# Patient Record
Sex: FEMALE | Race: WHITE | Employment: FULL TIME | URBAN - METROPOLITAN AREA
[De-identification: names, ages, dates, MRNs, and addresses within clinical notes are randomized per-mention and may not be internally consistent; named-entity substitution may affect disease eponyms.]

---

## 2017-01-26 ENCOUNTER — OFFICE VISIT (OUTPATIENT)
Dept: INTERNAL MEDICINE CLINIC | Age: 37
End: 2017-01-26

## 2017-01-26 VITALS
OXYGEN SATURATION: 96 % | SYSTOLIC BLOOD PRESSURE: 102 MMHG | RESPIRATION RATE: 16 BRPM | TEMPERATURE: 98.2 F | WEIGHT: 120.4 LBS | BODY MASS INDEX: 21.33 KG/M2 | HEIGHT: 63 IN | HEART RATE: 71 BPM | DIASTOLIC BLOOD PRESSURE: 70 MMHG

## 2017-01-26 DIAGNOSIS — Z00.00 ROUTINE GENERAL MEDICAL EXAMINATION AT A HEALTH CARE FACILITY: ICD-10-CM

## 2017-01-26 DIAGNOSIS — F33.8 SEASONAL AFFECTIVE DISORDER (HCC): Primary | ICD-10-CM

## 2017-01-26 DIAGNOSIS — F32.5 DEPRESSION, MAJOR, IN REMISSION (HCC): ICD-10-CM

## 2017-01-26 DIAGNOSIS — Z80.51 FAMILY HISTORY OF RENAL CANCER: ICD-10-CM

## 2017-01-26 NOTE — PROGRESS NOTES
HISTORY OF PRESENT ILLNESS  Christina Ozuna is a 39 y.o. female. HPI  New Patient  Christina Ozuna is a new patient. Prior care:  She has not had a PCP. Has been seeing her gynecologist for primary care. Her gynecologist Records have been requested. ER Visits/ Hospitalizations [id number]: [facility]TI      Health Maintenance  Breast Cancer screening: Mammogram previously Not indicated. Colorectal Cancer screening: Not indicated. Cervical Cancer screening: Up to date, completed by gynecologist.    FHx of Renal Cancer- mother     Depression  Patient is seen for followup of depression. Mother suspected depression Previous Treatment includes Zoloft and individual therapy. +Seasonal Affective Disorder   Sleep <7hrs, 3 days of the weeks   Exercise >150mins , decreased rocking   Eating out a lot of processed foods (organic frozen foods)     she denies suicidal thoughts with specific plan. she experiences the following side effects from the treatment: none.     PHQ 2 / 9, over the last two weeks 1/26/2017   Little interest or pleasure in doing things More than half the days   Feeling down, depressed or hopeless More than half the days   Total Score PHQ 2 4   Trouble falling or staying asleep, or sleeping too much Several days   Feeling tired or having little energy Several days   Poor appetite or overeating Not at all   Feeling bad about yourself - or that you are a failure or have let yourself or your family down Several days   Trouble concentrating on things such as school, work, reading or watching TV Not at all   Moving or speaking so slowly that other people could have noticed; or the opposite being so fidgety that others notice Not at all   Thoughts of being better off dead, or hurting yourself in some way Not at all   PHQ 9 Score 7   How difficult have these problems made it for you to do your work, take care of your home and get along with others Somewhat difficult         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   Review of Systems   Constitutional: Negative for diaphoresis, fever and weight loss. Eyes: Negative for blurred vision and pain. Respiratory: Negative for shortness of breath. Cardiovascular: Negative for chest pain, orthopnea and leg swelling. Gastrointestinal: Negative for heartburn and nausea. Neurological: Negative for focal weakness and headaches. Psychiatric/Behavioral: Positive for depression. Negative for suicidal ideas. Patient Active Problem List    Diagnosis Date Noted    Seasonal affective disorder ([facility] 01/26/2017    Depression, major, in remission ([facility] 01/26/2017    Family history of renal cancer 01/26/2017           Allergies   Allergen Reactions    Sulfa (Sulfonamide Antibiotics) Rash      Visit Vitals    /70 (BP 1 Location: Right arm, BP Patient Position: Sitting)    Pulse 71    Temp 98.2 °F (36.8 °C) (Oral)    Resp 16    Ht 5' 3.11\" (1.603 m)    Wt 120 lb 6.4 oz (54.6 kg)    SpO2 96%    BMI 21.25 kg/m2       Physical Exam   Constitutional: She is oriented to person, place, and time. She appears well-developed. No distress. Eyes: Conjunctivae are normal.   Neck: Neck supple. No thyromegaly present. Cardiovascular: Normal rate, regular rhythm and normal heart sounds. Pulmonary/Chest: Effort normal and breath sounds normal. No respiratory distress. She has no wheezes. She has no rales. She exhibits no tenderness. Lymphadenopathy:     She has no cervical adenopathy. Neurological: She is alert and oriented to person, place, and time. Skin: Skin is warm. Psychiatric: She has a normal mood and affect. ASSESSMENT and PLAN  Matheus Dodd was seen today for establish care. Diagnoses and all orders for this visit:    Seasonal affective disorder ([facility]- optimize lifestyle management. Recommended pt try finding a therapist using the list provided and www. psychologytoday.com. Consider Mood Light.  [place] Review information given. Patient Education:  Reviewed concept of depression as biochemical imbalance of neurotransmitters and rationale for treatment. Instructed patient to contact office or 911 promptly should condition worsen or any new symptoms appear and provided on-call telephone numbers. IF THE PATIENT HAS ANY SUICIDAL OR HOMICIDAL IDEATION, CALL THE OFFICE, DISCUSS WITH A SUPPORT MEMBER OR GO TO THE ER IMMEDIATELY. Patient was agreeable with this    -     REFERRAL TO PSYCHOLOGY    Depression, major, in remission ([facility]- see above   -     REFERRAL TO PSYCHOLOGY    Routine general medical examination at a health care facility  -     CBC WITH AUTOMATED DIFF  -     LIPID PANEL  -     METABOLIC PANEL, COMPREHENSIVE  -     THYROID PANEL  -     TSH 3RD GENERATION  -     VITAMIN D, 25 HYDROXY  -     HEMOGLOBIN A1C WITH EAG  -     URINALYSIS W/MICROSCOPIC    Family history of renal cancer- check UA   -     URINALYSIS W/MICROSCOPIC      Follow-up Disposition:  Return in about 4 months (around 5/26/2017) for Physical - 30 minute appointment. Medication risks/benefits/costs/interactions/alternatives discussed with patient. Adriana Gomez  was given an after visit summary which includes diagnoses, current medications, & vitals. she expressed understanding with the diagnosis and plan.

## 2017-01-26 NOTE — MR AVS SNAPSHOT
Visit Information Date & Time Provider Department Dept. Phone Encounter #  
 1/26/2017  3:00 PM Carl Hickey MD [facility] [phone] [id number] Follow-up Instructions Return in about 4 months (around 5/26/2017) for Physical - 30 minute appointment. Upcoming Health Maintenance Date Due DTaP/Tdap/Td series (1 - Tdap) 11/15/2001 PAP AKA CERVICAL CYTOLOGY 11/15/2001 INFLUENZA AGE 9 TO ADULT 8/1/2016 Allergies as of 1/26/2017  Review Complete On: 1/26/2017 By: Carl Hickey MD  
  
 Severity Noted Reaction Type Reactions Sulfa (Sulfonamide Antibiotics)  01/26/2017    Rash Current Immunizations  Never Reviewed No immunizations on file. Not reviewed this visit You Were Diagnosed With   
  
 Codes Comments Seasonal affective disorder ([facility]    -  Primary ICD-10-CM: F39 
ICD-9-CM: 296.99 Depression, major, in remission ([facility]     ICD-10-CM: F32.5 ICD-9-CM: 296.25 Routine general medical examination at a health care facility     ICD-10-CM: Z00.00 ICD-9-CM: V70.0 Family history of renal cancer     ICD-10-CM: Z80.51 ICD-9-CM: V16.51 Vitals BP Pulse Temp Resp Height(growth percentile) Weight(growth percentile) 102/70 (BP 1 Location: Right arm, BP Patient Position: Sitting) 71 98.2 °F (36.8 °C) (Oral) 16 5' 3.11\" (1.603 m) 120 lb 6.4 oz (54.6 kg) SpO2 BMI OB Status Smoking Status 96% 21.25 kg/m2 Unknown Never Smoker Vitals History BMI and BSA Data Body Mass Index Body Surface Area  
 21.25 kg/m 2 1.56 m 2 Preferred Pharmacy Pharmacy Name Phone [id number] [phone] Your Updated Medication List  
  
Notice  As of 1/26/2017  4:00 PM  
 You have not been prescribed any medications. We Performed the Following CBC WITH AUTOMATED DIFF [24263 CPT(R)] HEMOGLOBIN A1C WITH EAG [59682 CPT(R)] LIPID PANEL [15704 CPT(R)] METABOLIC PANEL, COMPREHENSIVE [45917 CPT(R)] REFERRAL TO PSYCHOLOGY [DVZ28 Custom] Comments:  
 Please evaluate patient for adjustment disorder THYROID PANEL [id number] CPT(R)] TSH 3RD GENERATION [55352 CPT(R)] URINALYSIS W/MICROSCOPIC [32964 CPT(R)] VITAMIN D, 25 HYDROXY [id number] CPT(R)] Follow-up Instructions Return in about 4 months (around 5/26/2017) for Physical - 30 minute appointment. Referral Information Referral ID Referred By Referred To  
  
 [id number] Deborah Berrios, [address], [address] [facility] Phone: [phone] Visits Status Start Date End Date 1 New Request 1/26/17 1/26/18 If your referral has a status of pending review or denied, additional information will be sent to support the outcome of this decision. Patient Instructions It was a pleasure to meet you! As discussed: 
 
Health Maintenance I have ordered your age appropriate labs please complete them. You will need to fast 10-12hrs before your appointment. Great job on [employer] and exercising! Remember goal for exercise is 150minutes of moderate exercise a day and diet goal is to eat 50% fruits and vegetables with minimal sugar, fat and oil daily. See health.gov or choosemyplate.gov for more details. Your cervical cancer and breast cancer screening will be completed by your ob/ gyn as scheduled. Seasonal Affective Disorder 
-Consider purchasing a mood light (see information below) -Try finding a therapist using the list provided and www. psychologytoday.com. [address] 
200 [facility] 200 [facility], [facility] 
[phone] [place] Address: [place] [address], [place], [address] [phone] Recovering From Depression: Care Instructions Your Care Instructions Taking good care of yourself is important as you recover from depression. In time, your symptoms will fade as your treatment takes hold. Do not give up. Instead, focus your energy on getting better. Your mood will improve. It just takes some time. Focus on things that can help you feel better, such as being with friends and family, eating well, and getting enough rest. But take things slowly. Do not do too much too soon. You will begin to feel better gradually. Follow-up care is a key part of your treatment and safety. Be sure to make and go to all appointments, and call your doctor if you are having problems. It's also a good idea to know your test results and keep a list of the medicines you take. How can you care for yourself at home? Be realistic · If you have a large task to do, break it up into smaller steps you can handle, and just do what you can. · You may want to put off important decisions until your depression has lifted. If you have plans that will have a major impact on your life, such as marriage, divorce, or a job change, try to wait a bit. Talk it over with friends and loved ones who can help you look at the overall picture first. 
· Reaching out to people for help is important. Do not isolate yourself. Let your family and friends help you. Find someone you can trust and confide in, and talk to that person. · Be patient, and be kind to yourself. Remember that depression is not your fault and is not something you can overcome with willpower alone. Treatment is necessary for depression, just like for any other illness. Feeling better takes time, and your mood will improve little by little. Stay active · Stay busy and get outside. Take a walk, or try some other light exercise. · Talk with your doctor about an exercise program. Exercise can help with mild depression. · Go to a movie or concert. Take part in a Anabaptism activity or other social gathering. Go to a ball game. · Ask a friend to have dinner with you. Take care of yourself · Eat a balanced diet with plenty of fresh fruits and vegetables, whole grains, and lean protein. If you have lost your appetite, eat small snacks rather than large meals. · Avoid drinking alcohol or using illegal drugs. Do not take medicines that have not been prescribed for you. They may interfere with medicines you may be taking for depression, or they may make your depression worse. · Take your medicines exactly as they are prescribed. You may start to feel better within 1 to 3 weeks of taking antidepressant medicine. But it can take as many as 6 to 8 weeks to see more improvement. If you have questions or concerns about your medicines, or if you do not notice any improvement by 3 weeks, talk to your doctor. · If you have any side effects from your medicine, tell your doctor. Antidepressants can make you feel tired, dizzy, or nervous. Some people have dry mouth, constipation, headaches, sexual problems, or diarrhea. Many of these side effects are mild and will go away on their own after you have been taking the medicine for a few weeks. Some may last longer. Talk to your doctor if side effects are bothering you too much. You might be able to try a different medicine. · Get enough sleep. If you have problems sleeping: ¨ Go to bed at the same time every night, and get up at the same time every morning. ¨ Keep your bedroom dark and quiet. ¨ Do not exercise after 5:00 p.m. ¨ Avoid drinks with caffeine after 5:00 p.m. · Avoid sleeping pills unless they are prescribed by the doctor treating your depression. Sleeping pills may make you groggy during the day, and they may interact with other medicine you are taking. · If you have any other illnesses, such as diabetes, heart disease, or high blood pressure, make sure to continue with your treatment.  Tell your doctor about all of the medicines you take, including those with or without a prescription. · Keep the numbers for these national suicide hotlines: 2-870-043-TALK (8-508.324.1981) and 6-173-OEYQFVO (8-999.407.8824). If you or someone you know talks about suicide or feeling hopeless, get help right away. When should you call for help? Call 911 anytime you think you may need emergency care. For example, call if: 
· You feel like hurting yourself or someone else. · Someone you know has depression and is about to attempt or is attempting suicide. Call your doctor now or seek immediate medical care if: 
· You hear voices. · Someone you know has depression and: 
¨ Starts to give away his or her possessions. ¨ Uses illegal drugs or drinks alcohol heavily. ¨ Talks or writes about death, including writing suicide notes or talking about guns, knives, or pills. ¨ Starts to spend a lot of time alone. ¨ Acts very aggressively or suddenly appears calm. Watch closely for changes in your health, and be sure to contact your doctor if: 
· You do not get better as expected. Where can you learn more? Go to http://cristina-mily.info/. Enter V433 in the search box to learn more about \"Recovering From Depression: Care Instructions. \" Current as of: July 26, 2016 Content Version: 11.1 © 9261-7071 [employer]. Care instructions adapted under license by [employer] (which disclaims liability or warranty for this information). If you have questions about a medical condition or this instruction, always ask your healthcare professional. [place] [address] any warranty or liability for your use of this information. Introducing \[facility]\"" & HEALTH SERVICES! Aleena Islas introduces [employer] patient portal. Now you can access parts of your medical record, email your doctor's office, and request medication refills online. 1. In your internet browser, go to [email or web address] 2. Click on the First Time User? Click Here link in the Sign In box. You will see the New Member Sign Up page. 3. Enter your [employer] Access Code exactly as it appears below. You will not need to use this code after youve completed the sign-up process. If you do not sign up before the expiration date, you must request a new code. · [employer] Access Code: [email or web address] Expires: 4/26/2017  2:55 PM 
 
4. Enter the last four digits of your Social Security Number (xxxx) and Date of Birth (mm/dd/yyyy) as indicated and click Submit. You will be taken to the next sign-up page. 5. Create a [employer] ID. This will be your [employer] login ID and cannot be changed, so think of one that is secure and easy to remember. 6. Create a [employer] password. You can change your password at any time. 7. Enter your Password Reset Question and Answer. This can be used at a later time if you forget your password. 8. Enter your e-mail address. You will receive e-mail notification when new information is available in [address] 9. Click Sign Up. You can now view and download portions of your medical record. 10. Click the Download Summary menu link to download a portable copy of your medical information. If you have questions, please visit the Frequently Asked Questions section of the [employer] website. Remember, [employer] is NOT to be used for urgent needs. For medical emergencies, dial 911. Now available from your iPhone and Android! Please provide this summary of care documentation to your next provider. Your primary care clinician is listed as Danielle Purvis. If you have any questions after today's visit, please call [phone].

## 2017-01-26 NOTE — PATIENT INSTRUCTIONS
It was a pleasure to meet you! As discussed:    Health Maintenance   I have ordered your age appropriate labs please complete them. You will need to fast 10-12hrs before your appointment. Great job on [employer] and exercising! Remember goal for exercise is 150minutes of moderate exercise a day and diet goal is to eat 50% fruits and vegetables with minimal sugar, fat and oil daily. See health.gov or choosemyplate.gov for more details. Your cervical cancer and breast cancer screening will be completed by your ob/ gyn as scheduled. Seasonal Affective Disorder  -Consider purchasing a mood light (see information below)  -Try finding a therapist using the list provided and www. [employer]. Becky Warren [address] [address] [facility]   [address]  [address]  [phone]    [place] Joel   Address: [address], [address]  [phone]           Recovering From Depression: Care Instructions  Your Care Instructions  Taking good care of yourself is important as you recover from depression. In time, your symptoms will fade as your treatment takes hold. Do not give up. Instead, focus your energy on getting better. Your mood will improve. It just takes some time. Focus on things that can help you feel better, such as being with friends and family, eating well, and getting enough rest. But take things slowly. Do not do too much too soon. You will begin to feel better gradually. Follow-up care is a key part of your treatment and safety. Be sure to make and go to all appointments, and call your doctor if you are having problems. It's also a good idea to know your test results and keep a list of the medicines you take. How can you care for yourself at home? Be realistic  · If you have a large task to do, break it up into smaller steps you can handle, and just do what you can. · You may want to put off important decisions until your depression has lifted. If you have plans that will have a major impact on your life, such as marriage, divorce, or a job change, try to wait a bit. Talk it over with friends and loved ones who can help you look at the overall picture first.  · Reaching out to people for help is important. Do not isolate yourself. Let your family and friends help you. Find someone you can trust and confide in, and talk to that person. · Be patient, and be kind to yourself. Remember that depression is not your fault and is not something you can overcome with willpower alone. Treatment is necessary for depression, just like for any other illness. Feeling better takes time, and your mood will improve little by little. Stay active  · Stay busy and get outside. Take a walk, or try some other light exercise. · Talk with your doctor about an exercise program. Exercise can help with mild depression. · Go to a movie or concert. Take part in a Gnosticism activity or other social gathering. Go to a ball game. · Ask a friend to have dinner with you. Take care of yourself  · Eat a balanced diet with plenty of fresh fruits and vegetables, whole grains, and lean protein. If you have lost your appetite, eat small snacks rather than large meals. · Avoid drinking alcohol or using illegal drugs. Do not take medicines that have not been prescribed for you. They may interfere with medicines you may be taking for depression, or they may make your depression worse. · Take your medicines exactly as they are prescribed. You may start to feel better within 1 to 3 weeks of taking antidepressant medicine. But it can take as many as 6 to 8 weeks to see more improvement. If you have questions or concerns about your medicines, or if you do not notice any improvement by 3 weeks, talk to your doctor. · If you have any side effects from your medicine, tell your doctor. Antidepressants can make you feel tired, dizzy, or nervous.  Some people have dry mouth, constipation, headaches, sexual problems, or diarrhea. Many of these side effects are mild and will go away on their own after you have been taking the medicine for a few weeks. Some may last longer. Talk to your doctor if side effects are bothering you too much. You might be able to try a different medicine. · Get enough sleep. If you have problems sleeping:  ¨ Go to bed at the same time every night, and get up at the same time every morning. ¨ Keep your bedroom dark and quiet. ¨ Do not exercise after 5:00 p.m. ¨ Avoid drinks with caffeine after 5:00 p.m. · Avoid sleeping pills unless they are prescribed by the doctor treating your depression. Sleeping pills may make you groggy during the day, and they may interact with other medicine you are taking. · If you have any other illnesses, such as diabetes, heart disease, or high blood pressure, make sure to continue with your treatment. Tell your doctor about all of the medicines you take, including those with or without a prescription. · Keep the numbers for these national suicide hotlines: 1-896-319-TALK (8-125.554.1972) and 1-942-TOKWLBC (5-521.577.9290). If you or someone you know talks about suicide or feeling hopeless, get help right away. When should you call for help? Call 911 anytime you think you may need emergency care. For example, call if:  · You feel like hurting yourself or someone else. · Someone you know has depression and is about to attempt or is attempting suicide. Call your doctor now or seek immediate medical care if:  · You hear voices. · Someone you know has depression and:  ¨ Starts to give away his or her possessions. ¨ Uses illegal drugs or drinks alcohol heavily. ¨ Talks or writes about death, including writing suicide notes or talking about guns, knives, or pills. ¨ Starts to spend a lot of time alone. ¨ Acts very aggressively or suddenly appears calm.   Watch closely for changes in your health, and be sure to contact your doctor if:  · You do not get better as expected. Where can you learn more? Go to http://cristina-mily.info/. Enter N991 in the search box to learn more about \"Recovering From Depression: Care Instructions. \"  Current as of: July 26, 2016  Content Version: 11.1  © 3714-0775 [employer] Incorporated. Care instructions adapted under license by [employer] (which disclaims liability or warranty for this information). If you have questions about a medical condition or this instruction, always ask your healthcare professional. [address] any warranty or liability for your use of this information.

## 2017-05-09 ENCOUNTER — OFFICE VISIT (OUTPATIENT)
Dept: INTERNAL MEDICINE CLINIC | Age: 37
End: 2017-05-09

## 2017-05-09 VITALS
OXYGEN SATURATION: 97 % | WEIGHT: 124 LBS | RESPIRATION RATE: 12 BRPM | DIASTOLIC BLOOD PRESSURE: 64 MMHG | HEART RATE: 77 BPM | HEIGHT: 63 IN | TEMPERATURE: 98.6 F | SYSTOLIC BLOOD PRESSURE: 98 MMHG | BODY MASS INDEX: 21.97 KG/M2

## 2017-05-09 DIAGNOSIS — G44.52 NEW DAILY PERSISTENT HEADACHE: Primary | ICD-10-CM

## 2017-05-09 NOTE — PATIENT INSTRUCTIONS
Meclizine or Dramamine for dizziness     Vertigo: Care Instructions  Your Care Instructions  Vertigo is the feeling that you or your surroundings are moving when there is no actual movement. It is often described as a feeling of spinning, whirling, falling, or tilting. Vertigo may make you vomit or feel nauseated. You may have trouble standing or walking and may lose your balance. Vertigo is often related to an inner ear problem, but it can have other more serious causes. If vertigo continues, you may need more tests to find its cause. Follow-up care is a key part of your treatment and safety. Be sure to make and go to all appointments, and call your doctor if you are having problems. Its also a good idea to know your test results and keep a list of the medicines you take. How can you care for yourself at home? · Do not lie flat on your back. Prop yourself up slightly. This may reduce the spinning feeling. Keep your eyes open. · Move slowly so that you do not fall. · If your doctor recommends medicine, take it exactly as directed. · Do not drive while you are having vertigo. Certain exercises, called Jose-Daroff exercises, can help decrease vertigo. To do Jose-Daroff exercises:  · Sit on the edge of a bed or sofa and quickly lie down on the side that causes the worst vertigo. Lie on your side with your ear down. · Stay in this position for at least 30 seconds or until the vertigo goes away. · Sit up. If this causes vertigo, wait for it to stop. · Repeat the procedure on the other side. · Repeat this 10 times. Do these exercises 2 times a day until the vertigo is gone. When should you call for help? Call 911 anytime you think you may need emergency care. For example, call if:  · You passed out (lost consciousness). · You have symptoms of a stroke.  These may include:  ¨ Sudden numbness, tingling, weakness, or loss of movement in your face, arm, or leg, especially on only one side of your body.  ¨ Sudden vision changes. ¨ Sudden trouble speaking. ¨ Sudden confusion or trouble understanding simple statements. ¨ Sudden problems with walking or balance. ¨ A sudden, severe headache that is different from past headaches. Call your doctor now or seek immediate medical care if:  · Vertigo occurs with a fever, a headache, or ringing in your ears. · You have new or increased nausea and vomiting. Watch closely for changes in your health, and be sure to contact your doctor if:  · Vertigo gets worse or happens more often. · Vertigo has not gotten better after 2 weeks. Where can you learn more? Go to http://cristina-mily.info/. Enter J727 in the search box to learn more about \"Vertigo: Care Instructions. \"  Current as of: July 29, 2016  Content Version: 11.2  © 4809-0314 [employer]. Care instructions adapted under license by [employer] (which disclaims liability or warranty for this information). If you have questions about a medical condition or this instruction, always ask your healthcare professional. [place] [address] any warranty or liability for your use of this information. Headache: Care Instructions  Your Care Instructions    Headaches have many possible causes. Most headaches aren't a sign of a more serious problem, and they will get better on their own. Home treatment may help you feel better faster. The doctor has checked you carefully, but problems can develop later. If you notice any problems or new symptoms, get medical treatment right away. Follow-up care is a key part of your treatment and safety. Be sure to make and go to all appointments, and call your doctor if you are having problems. It's also a good idea to know your test results and keep a list of the medicines you take. How can you care for yourself at home? · Do not drive if you have taken a prescription pain medicine.   · Rest in a quiet, dark room until your headache is gone. Close your eyes and try to relax or go to sleep. Don't watch TV or read. · Put a cold, moist cloth or cold pack on the painful area for 10 to 20 minutes at a time. Put a thin cloth between the cold pack and your skin. · Use a warm, moist towel or a heating pad set on low to relax tight shoulder and neck muscles. · Have someone gently massage your neck and shoulders. · Take pain medicines exactly as directed. ¨ If the doctor gave you a prescription medicine for pain, take it as prescribed. ¨ If you are not taking a prescription pain medicine, ask your doctor if you can take an over-the-counter medicine. · Be careful not to take pain medicine more often than the instructions allow, because you may get worse or more frequent headaches when the medicine wears off. · Do not ignore new symptoms that occur with a headache, such as a fever, weakness or numbness, vision changes, or confusion. These may be signs of a more serious problem. To prevent headaches  · Keep a headache diary so you can figure out what triggers your headaches. Avoiding triggers may help you prevent headaches. Record when each headache began, how long it lasted, and what the pain was like (throbbing, aching, stabbing, or dull). Write down any other symptoms you had with the headache, such as nausea, flashing lights or dark spots, or sensitivity to bright light or loud noise. Note if the headache occurred near your period. List anything that might have triggered the headache, such as certain foods (chocolate, cheese, wine) or odors, smoke, bright light, stress, or lack of sleep. · Find healthy ways to deal with stress. Headaches are most common during or right after stressful times. Take time to relax before and after you do something that has caused a headache in the past.  · Try to keep your muscles relaxed by keeping good posture. Check your jaw, face, neck, and shoulder muscles for tension, and try relaxing them. When sitting at a desk, change positions often, and stretch for 30 seconds each hour. · Get plenty of sleep and exercise. · Eat regularly and well. Long periods without food can trigger a headache. · Treat yourself to a massage. Some people find that regular massages are very helpful in relieving tension. · Limit caffeine by not drinking too much coffee, tea, or soda. But don't quit caffeine suddenly, because that can also give you headaches. · Reduce eyestrain from computers by blinking frequently and looking away from the computer screen every so often. Make sure you have proper eyewear and that your monitor is set up properly, about an arm's length away. · Seek help if you have depression or anxiety. Your headaches may be linked to these conditions. Treatment can both prevent headaches and help with symptoms of anxiety or depression. When should you call for help? Call 911 anytime you think you may need emergency care. For example, call if:  · You have signs of a stroke. These may include:  ¨ Sudden numbness, paralysis, or weakness in your face, arm, or leg, especially on only one side of your body. ¨ Sudden vision changes. ¨ Sudden trouble speaking. ¨ Sudden confusion or trouble understanding simple statements. ¨ Sudden problems with walking or balance. ¨ A sudden, severe headache that is different from past headaches. Call your doctor now or seek immediate medical care if:  · You have a new or worse headache. · Your headache gets much worse. Where can you learn more? Go to http://cristina-mily.info/. Enter M271 in the search box to learn more about \"Headache: Care Instructions. \"  Current as of: October 14, 2016  Content Version: 11.2  © 9773-7496 [employer]. Care instructions adapted under license by [employer] (which disclaims liability or warranty for this information).  If you have questions about a medical condition or this instruction, always ask your healthcare professional. [place] [address] any warranty or liability for your use of this information.

## 2017-05-09 NOTE — MR AVS SNAPSHOT
Visit Information Date & Time Provider Department Dept. Phone Encounter #  
 5/9/2017  4:15 PM Arnaud Maciel, [address] [facility] [phone] Follow-up Instructions Return if symptoms worsen or fail to improve. Your Appointments 5/26/2017  8:00 AM  
PHYSICAL with Mayte Vázquez MD  
[facility]) Appt Note: cpe no pap  
 [address] [facility] [address]  
[address] [facility] [place] [address] Upcoming Health Maintenance Date Due DTaP/Tdap/Td series (1 - Tdap) 11/15/2001 INFLUENZA AGE 9 TO ADULT 8/1/2017 PAP AKA CERVICAL CYTOLOGY 6/14/2018 Allergies as of 5/9/2017  Review Complete On: 5/9/2017 By: Arnaud Maciel MD  
  
 Severity Noted Reaction Type Reactions Sulfa (Sulfonamide Antibiotics)  01/26/2017    Rash Current Immunizations  Reviewed on 5/9/2017 No immunizations on file. Reviewed by Segundo Jay RN on 5/9/2017 at  4:33 PM  
You Were Diagnosed With   
  
 Codes Comments New daily persistent headache    -  Primary ICD-10-CM: G44.52 
ICD-9-CM: 339.42 Vitals BP Pulse Temp Resp Height(growth percentile) Weight(growth percentile) 98/64 77 98.6 °F (37 °C) (Oral) 12 5' 3.11\" (1.603 m) 124 lb (56.2 kg) SpO2 BMI OB Status Smoking Status 97% 21.89 kg/m2 Medically Induced Never Smoker BMI and BSA Data Body Mass Index Body Surface Area  
 21.89 kg/m 2 1.58 m 2 Preferred Pharmacy Pharmacy Name Phone [id number] [phone] Your Updated Medication List  
  
   
This list is accurate as of: 5/9/17  4:51 PM.  Always use your most recent med list.  
  
  
  
  
 NORETH-ETHINYL ESTRADIOL-IRON PO Take  by mouth. Follow-up Instructions Return if symptoms worsen or fail to improve. Patient Instructions Meclizine or Dramamine for dizziness Vertigo: Care Instructions Your Care Instructions Vertigo is the feeling that you or your surroundings are moving when there is no actual movement. It is often described as a feeling of spinning, whirling, falling, or tilting. Vertigo may make you vomit or feel nauseated. You may have trouble standing or walking and may lose your balance. Vertigo is often related to an inner ear problem, but it can have other more serious causes. If vertigo continues, you may need more tests to find its cause. Follow-up care is a key part of your treatment and safety. Be sure to make and go to all appointments, and call your doctor if you are having problems. Its also a good idea to know your test results and keep a list of the medicines you take. How can you care for yourself at home? · Do not lie flat on your back. Prop yourself up slightly. This may reduce the spinning feeling. Keep your eyes open. · Move slowly so that you do not fall. · If your doctor recommends medicine, take it exactly as directed. · Do not drive while you are having vertigo. Certain exercises, called Jose-Daroff exercises, can help decrease vertigo. To do Jose-Daroff exercises: · Sit on the edge of a bed or sofa and quickly lie down on the side that causes the worst vertigo. Lie on your side with your ear down. · Stay in this position for at least 30 seconds or until the vertigo goes away. · Sit up. If this causes vertigo, wait for it to stop. · Repeat the procedure on the other side. · Repeat this 10 times. Do these exercises 2 times a day until the vertigo is gone. When should you call for help? Call 911 anytime you think you may need emergency care. For example, call if: 
· You passed out (lost consciousness). · You have symptoms of a stroke.  These may include: 
¨ Sudden numbness, tingling, weakness, or loss of movement in your face, arm, or leg, especially on only one side of your body. ¨ Sudden vision changes. ¨ Sudden trouble speaking. ¨ Sudden confusion or trouble understanding simple statements. ¨ Sudden problems with walking or balance. ¨ A sudden, severe headache that is different from past headaches. Call your doctor now or seek immediate medical care if: · Vertigo occurs with a fever, a headache, or ringing in your ears. · You have new or increased nausea and vomiting. Watch closely for changes in your health, and be sure to contact your doctor if: · Vertigo gets worse or happens more often. · Vertigo has not gotten better after 2 weeks. Where can you learn more? Go to http://cristina-mily.info/. Enter S124 in the search box to learn more about \"Vertigo: Care Instructions. \" Current as of: July 29, 2016 Content Version: 11.2 © 7237-2087 [employer]. Care instructions adapted under license by [employer] (which disclaims liability or warranty for this information). If you have questions about a medical condition or this instruction, always ask your healthcare professional. [address] any warranty or liability for your use of this information. Headache: Care Instructions Your Care Instructions Headaches have many possible causes. Most headaches aren't a sign of a more serious problem, and they will get better on their own. Home treatment may help you feel better faster. The doctor has checked you carefully, but problems can develop later. If you notice any problems or new symptoms, get medical treatment right away. Follow-up care is a key part of your treatment and safety. Be sure to make and go to all appointments, and call your doctor if you are having problems. It's also a good idea to know your test results and keep a list of the medicines you take. How can you care for yourself at home? · Do not drive if you have taken a prescription pain medicine. · Rest in a quiet, dark room until your headache is gone. Close your eyes and try to relax or go to sleep. Don't watch TV or read. · Put a cold, moist cloth or cold pack on the painful area for 10 to 20 minutes at a time. Put a thin cloth between the cold pack and your skin. · Use a warm, moist towel or a heating pad set on low to relax tight shoulder and neck muscles. · Have someone gently massage your neck and shoulders. · Take pain medicines exactly as directed. ¨ If the doctor gave you a prescription medicine for pain, take it as prescribed. ¨ If you are not taking a prescription pain medicine, ask your doctor if you can take an over-the-counter medicine. · Be careful not to take pain medicine more often than the instructions allow, because you may get worse or more frequent headaches when the medicine wears off. · Do not ignore new symptoms that occur with a headache, such as a fever, weakness or numbness, vision changes, or confusion. These may be signs of a more serious problem. To prevent headaches · Keep a headache diary so you can figure out what triggers your headaches. Avoiding triggers may help you prevent headaches. Record when each headache began, how long it lasted, and what the pain was like (throbbing, aching, stabbing, or dull). Write down any other symptoms you had with the headache, such as nausea, flashing lights or dark spots, or sensitivity to bright light or loud noise. Note if the headache occurred near your period. List anything that might have triggered the headache, such as certain foods (chocolate, cheese, wine) or odors, smoke, bright light, stress, or lack of sleep. · Find healthy ways to deal with stress. Headaches are most common during or right after stressful times.  Take time to relax before and after you do something that has caused a headache in the past. 
 · Try to keep your muscles relaxed by keeping good posture. Check your jaw, face, neck, and shoulder muscles for tension, and try relaxing them. When sitting at a desk, change positions often, and stretch for 30 seconds each hour. · Get plenty of sleep and exercise. · Eat regularly and well. Long periods without food can trigger a headache. · Treat yourself to a massage. Some people find that regular massages are very helpful in relieving tension. · Limit caffeine by not drinking too much coffee, tea, or soda. But don't quit caffeine suddenly, because that can also give you headaches. · Reduce eyestrain from computers by blinking frequently and looking away from the computer screen every so often. Make sure you have proper eyewear and that your monitor is set up properly, about an arm's length away. · Seek help if you have depression or anxiety. Your headaches may be linked to these conditions. Treatment can both prevent headaches and help with symptoms of anxiety or depression. When should you call for help? Call 911 anytime you think you may need emergency care. For example, call if: 
· You have signs of a stroke. These may include: 
¨ Sudden numbness, paralysis, or weakness in your face, arm, or leg, especially on only one side of your body. ¨ Sudden vision changes. ¨ Sudden trouble speaking. ¨ Sudden confusion or trouble understanding simple statements. ¨ Sudden problems with walking or balance. ¨ A sudden, severe headache that is different from past headaches. Call your doctor now or seek immediate medical care if: 
· You have a new or worse headache. · Your headache gets much worse. Where can you learn more? Go to http://cristina-mily.info/. Enter M271 in the search box to learn more about \"Headache: Care Instructions. \" Current as of: October 14, 2016 Content Version: 11.2 © 5180-7825 [employer].  Care instructions adapted under license by [address] (which disclaims liability or warranty for this information). If you have questions about a medical condition or this instruction, always ask your healthcare professional. [address] any warranty or liability for your use of this information. Introducing [facility] & HEALTH SERVICES! Herminia Brown introduces "[employer] patient portal. Now you can access parts of your medical record, email your doctor's office, and request medication refills online. 1. In your internet browser, go to [email or web address]://[email or web address]. SezWho/[employer] 2. Click on the First Time User? Click Here link in the Sign In box. You will see the New Member Sign Up page. 3. Enter your "[employer] Access Code exactly as it appears below. You will not need to use this code after youve completed the sign-up process. If you do not sign up before the expiration date, you must request a new code. · "[employer] Access Code: [email or web address] Expires: 8/7/2017  9:47 AM 
 
4. Enter the last four digits of your Social Security Number (xxxx) and Date of Birth (mm/dd/yyyy) as indicated and click Submit. You will be taken to the next sign-up page. 5. Create a "[employer] ID. This will be your "[employer] login ID and cannot be changed, so think of one that is secure and easy to remember. 6. Create a "[employer] password. You can change your password at any time. 7. Enter your Password Reset Question and Answer. This can be used at a later time if you forget your password. 8. Enter your e-mail address. You will receive e-mail notification when new information is available in [address] 9. Click Sign Up. You can now view and download portions of your medical record. 10. Click the Download Summary menu link to download a portable copy of your medical information. If you have questions, please visit the Frequently Asked Questions section of the "[employer] website.  Remember, "[employer] is NOT to be used for urgent needs. For medical emergencies, dial 911. Now available from your iPhone and Android! Please provide this summary of care documentation to your next provider. Your primary care clinician is listed as Bandar Lott. If you have any questions after today's visit, please call [phone].

## 2017-05-09 NOTE — PROGRESS NOTES
Kathie Dash is a 39 y.o. female who was seen in clinic today (5/9/2017). Assessment & Plan:  Gaurav Bass was seen today for ear pain. Diagnoses and all orders for this visit:    New daily persistent headache- this is a new problem, differential dx reviewed with the patient, sounds viral.  Reassured no signs of infection or intracranial etiology. Will treat with: NSAIDs, rest.  Red flags were reviewed with the patient to RTC or notify me, expected time course for resolution reviewed. Follow-up Disposition:  Return if symptoms worsen or fail to improve.       ----------------------------------------------------------------------    Subjective:  URI Review  Gaurav Bass returns to clinic today to talk about: headaches and concerned about ear infection for 2 days ago, which are unchanged since that time. She also reports right sided h/a (aching), dizziness w/ bending over, right ear pain, rhinorrhea and sinus congestion. She denies a history of: fever, chills, chest congestion, wheezing and SOB/GABRIEL. Treatments have included: ibuprofen which have been somewhat effective. Relevant PMH: No pertinent additional PMH. Patient reports sick contacts: no.         Prior to Admission medications    Medication Sig Start Date End Date Taking? Authorizing Provider   NORETH-ETHINYL ESTRADIOL-IRON PO Take  by mouth. Yes Historical Provider          Allergies   Allergen Reactions    Sulfa (Sulfonamide Antibiotics) Rash           ROS : per HPI       Objective:   Physical Exam   Constitutional: No distress. HENT:   Right Ear: Tympanic membrane is not erythematous and not bulging. A middle ear effusion (minimal) is present. Left Ear: Tympanic membrane is not erythematous and not bulging. A middle ear effusion (minimal) is present. Nose: No mucosal edema or rhinorrhea. Right sinus exhibits no maxillary sinus tenderness and no frontal sinus tenderness.  Left sinus exhibits no maxillary sinus tenderness and no frontal sinus tenderness. Mouth/Throat: Uvula is midline and mucous membranes are normal. No oropharyngeal exudate or posterior oropharyngeal erythema. Eyes: Conjunctivae are normal. No scleral icterus. Neck: Neck supple. Cardiovascular: Regular rhythm and normal heart sounds. No murmur heard. Pulmonary/Chest: Effort normal and breath sounds normal. She has no wheezes. She has no rales. Musculoskeletal:        Cervical back: She exhibits normal range of motion, no tenderness, no bony tenderness, no pain and no spasm. Lymphadenopathy:     She has no cervical adenopathy. Neurological:   Lottie Higashi testing was negative bilaterally          Visit Vitals    BP 98/64    Pulse 77    Temp 98.6 °F (37 °C) (Oral)    Resp 12    Ht 5' 3.11\" (1.603 m)    Wt 124 lb (56.2 kg)    SpO2 97%    BMI 21.89 kg/m2         Disclaimer:  Advised her to call back or return to office if symptoms worsen/change/persist.  Discussed expected course/resolution/complications of diagnosis in detail with patient. Medication risks/benefits/costs/interactions/alternatives discussed with patient. She was given an after visit summary which includes diagnoses, current medications, & vitals. She expressed understanding with the diagnosis and plan.         Darcy Kearns MD

## 2017-05-19 ENCOUNTER — OFFICE VISIT (OUTPATIENT)
Dept: INTERNAL MEDICINE CLINIC | Age: 37
End: 2017-05-19

## 2017-05-19 VITALS
HEIGHT: 63 IN | HEART RATE: 93 BPM | DIASTOLIC BLOOD PRESSURE: 64 MMHG | WEIGHT: 123.2 LBS | RESPIRATION RATE: 18 BRPM | BODY MASS INDEX: 21.83 KG/M2 | TEMPERATURE: 98.7 F | SYSTOLIC BLOOD PRESSURE: 105 MMHG | OXYGEN SATURATION: 98 %

## 2017-05-19 DIAGNOSIS — L30.9 DERMATITIS: Primary | ICD-10-CM

## 2017-05-19 LAB
25(OH)D3+25(OH)D2 SERPL-MCNC: 29.8 NG/ML (ref 30–100)
ALBUMIN SERPL-MCNC: 5.2 G/DL (ref 3.5–5.5)
ALBUMIN/GLOB SERPL: 1.7 {RATIO} (ref 1.2–2.2)
ALP SERPL-CCNC: 68 IU/L (ref 39–117)
ALT SERPL-CCNC: 23 IU/L (ref 0–32)
APPEARANCE UR: CLEAR
AST SERPL-CCNC: 27 IU/L (ref 0–40)
BACTERIA #/AREA URNS HPF: NORMAL /[HPF]
BASOPHILS # BLD AUTO: 0 X10E3/UL (ref 0–0.2)
BASOPHILS NFR BLD AUTO: 0 %
BILIRUB SERPL-MCNC: 2.4 MG/DL (ref 0–1.2)
BILIRUB UR QL STRIP: NEGATIVE
BUN SERPL-MCNC: 12 MG/DL (ref 6–20)
BUN/CREAT SERPL: 13 (ref 9–23)
CALCIUM SERPL-MCNC: 9.9 MG/DL (ref 8.7–10.2)
CASTS URNS QL MICRO: NORMAL /LPF
CHLORIDE SERPL-SCNC: 97 MMOL/L (ref 96–106)
CHOLEST SERPL-MCNC: 253 MG/DL (ref 100–199)
CO2 SERPL-SCNC: 24 MMOL/L (ref 18–29)
COLOR UR: YELLOW
CREAT SERPL-MCNC: 0.89 MG/DL (ref 0.57–1)
EOSINOPHIL # BLD AUTO: 0.1 X10E3/UL (ref 0–0.4)
EOSINOPHIL NFR BLD AUTO: 2 %
EPI CELLS #/AREA URNS HPF: NORMAL /HPF
ERYTHROCYTE [DISTWIDTH] IN BLOOD BY AUTOMATED COUNT: 12.6 % (ref 12.3–15.4)
EST. AVERAGE GLUCOSE BLD GHB EST-MCNC: 108 MG/DL
FT4I SERPL CALC-MCNC: 2.5 (ref 1.2–4.9)
GLOBULIN SER CALC-MCNC: 3.1 G/DL (ref 1.5–4.5)
GLUCOSE SERPL-MCNC: 84 MG/DL (ref 65–99)
GLUCOSE UR QL: NEGATIVE
HBA1C MFR BLD: 5.4 % (ref 4.8–5.6)
HCT VFR BLD AUTO: 45.6 % (ref 34–46.6)
HDLC SERPL-MCNC: 72 MG/DL
HGB BLD-MCNC: 15.3 G/DL (ref 11.1–15.9)
HGB UR QL STRIP: NEGATIVE
IMM GRANULOCYTES # BLD: 0 X10E3/UL (ref 0–0.1)
IMM GRANULOCYTES NFR BLD: 0 %
KETONES UR QL STRIP: NEGATIVE
LDLC SERPL CALC-MCNC: 167 MG/DL (ref 0–99)
LEUKOCYTE ESTERASE UR QL STRIP: NEGATIVE
LYMPHOCYTES # BLD AUTO: 1.8 X10E3/UL (ref 0.7–3.1)
LYMPHOCYTES NFR BLD AUTO: 29 %
MCH RBC QN AUTO: 30.9 PG (ref 26.6–33)
MCHC RBC AUTO-ENTMCNC: 33.6 G/DL (ref 31.5–35.7)
MCV RBC AUTO: 92 FL (ref 79–97)
MICRO URNS: NORMAL
MICRO URNS: NORMAL
MONOCYTES # BLD AUTO: 0.4 X10E3/UL (ref 0.1–0.9)
MONOCYTES NFR BLD AUTO: 6 %
MUCOUS THREADS URNS QL MICRO: PRESENT
NEUTROPHILS # BLD AUTO: 3.9 X10E3/UL (ref 1.4–7)
NEUTROPHILS NFR BLD AUTO: 63 %
NITRITE UR QL STRIP: NEGATIVE
PH UR STRIP: 6.5 [PH] (ref 5–7.5)
PLATELET # BLD AUTO: 344 X10E3/UL (ref 150–379)
POTASSIUM SERPL-SCNC: 4.1 MMOL/L (ref 3.5–5.2)
PROT SERPL-MCNC: 8.3 G/DL (ref 6–8.5)
PROT UR QL STRIP: NEGATIVE
RBC # BLD AUTO: 4.95 X10E6/UL (ref 3.77–5.28)
RBC #/AREA URNS HPF: NORMAL /HPF
SODIUM SERPL-SCNC: 137 MMOL/L (ref 134–144)
SP GR UR: 1.03 (ref 1–1.03)
T3RU NFR SERPL: 22 % (ref 24–39)
T4 SERPL-MCNC: 11.3 UG/DL (ref 4.5–12)
TRIGL SERPL-MCNC: 70 MG/DL (ref 0–149)
TSH SERPL DL<=0.005 MIU/L-ACNC: 0.8 UIU/ML (ref 0.45–4.5)
UROBILINOGEN UR STRIP-MCNC: 0.2 MG/DL (ref 0.2–1)
VLDLC SERPL CALC-MCNC: 14 MG/DL (ref 5–40)
WBC # BLD AUTO: 6.2 X10E3/UL (ref 3.4–10.8)
WBC #/AREA URNS HPF: NORMAL /HPF

## 2017-05-19 RX ORDER — MOMETASONE FUROATE 1 MG/G
CREAM TOPICAL 2 TIMES DAILY
Qty: 30 G | Refills: 1 | Status: SHIPPED | OUTPATIENT
Start: 2017-05-19 | End: 2018-01-23

## 2017-05-19 NOTE — PATIENT INSTRUCTIONS
Elocon cream topically twice daily x 1 week  Claritin or Allegra daily   Call or return to clinic if these symptoms worsen or fail to improve as anticipated.

## 2017-05-19 NOTE — MR AVS SNAPSHOT
Visit Information Date & Time Provider Department Dept. Phone Encounter #  
 5/19/2017  1:45 PM Cyn Corbin, [facility] 2 [id number] Your Appointments 5/26/2017  8:00 AM  
PHYSICAL with Chyna nUderwood MD  
[address] [facility] Jeannette George) Appt Note: cpe no pap  
 [address] [facility] [address]  
[facility] [address] [facility] Upcoming Health Maintenance Date Due DTaP/Tdap/Td series (1 - Tdap) 11/15/2001 INFLUENZA AGE 9 TO ADULT 8/1/2017 PAP AKA CERVICAL CYTOLOGY 6/14/2018 Allergies as of 5/19/2017  Review Complete On: 5/19/2017 By: Theresa Olivarez LPN Severity Noted Reaction Type Reactions Sulfa (Sulfonamide Antibiotics)  01/26/2017    Rash Current Immunizations  Reviewed on 5/9/2017 No immunizations on file. Not reviewed this visit Vitals BP Pulse Temp Resp Height(growth percentile) Weight(growth percentile) 105/64 (BP 1 Location: Right arm, BP Patient Position: Sitting) 93 98.7 °F (37.1 °C) (Oral) 18 5' 3\" (1.6 m) 123 lb 3.2 oz (55.9 kg) SpO2 BMI OB Status Smoking Status 98% 21.82 kg/m2 Medically Induced Never Smoker Vitals History BMI and BSA Data Body Mass Index Body Surface Area  
 21.82 kg/m 2 1.58 m 2 Preferred Pharmacy Pharmacy Name Phone [id number] [address] [phone] Your Updated Medication List  
  
   
This list is accurate as of: 5/19/17  2:16 PM.  Always use your most recent med list.  
  
  
  
  
 mometasone 0.1 % topical cream  
Commonly known as:  Star Coyer Apply  to affected area two (2) times a day. NORETH-ETHINYL ESTRADIOL-IRON PO Take  by mouth. Prescriptions Sent to Pharmacy  Refills  
 mometasone (ELOCON) 0.1 % topical cream 1  
 Sig: Apply  to affected area two (2) times a day. Class: Normal  
 Pharmacy: [address] Ph #: [phone] Route: Topical  
  
Patient Instructions Elocon cream topically twice daily x 1 week Claritin or Allegra daily Call or return to clinic if these symptoms worsen or fail to improve as anticipated. Introducing [facility]! [facility] introduces Wellkeeper patient portal. Now you can access parts of your medical record, email your doctor's office, and request medication refills online. 1. In your internet browser, go to [email or web address] 2. Click on the First Time User? Click Here link in the Sign In box. You will see the New Member Sign Up page. 3. Enter your Wellkeeper Access Code exactly as it appears below. You will not need to use this code after youve completed the sign-up process. If you do not sign up before the expiration date, you must request a new code. · Wellkeeper Access Code: [email or web address] Expires: 8/7/2017  9:47 AM 
 
4. Enter the last four digits of your Social Security Number (xxxx) and Date of Birth (mm/dd/yyyy) as indicated and click Submit. You will be taken to the next sign-up page. 5. Create a Wellkeeper ID. This will be your Wellkeeper login ID and cannot be changed, so think of one that is secure and easy to remember. 6. Create a Wellkeeper password. You can change your password at any time. 7. Enter your Password Reset Question and Answer. This can be used at a later time if you forget your password. 8. Enter your e-mail address. You will receive e-mail notification when new information is available in [address] 9. Click Sign Up. You can now view and download portions of your medical record. 10. Click the Download Summary menu link to download a portable copy of your medical information.  
 
If you have questions, please visit the Frequently Asked Questions section of the mgMEDIA. Remember, cWyzehart is NOT to be used for urgent needs. For medical emergencies, dial 911. Now available from your iPhone and Android! Please provide this summary of care documentation to your next provider. Your primary care clinician is listed as David Gupta. If you have any questions after today's visit, please call [phone].

## 2017-05-19 NOTE — PROGRESS NOTES
HISTORY OF PRESENT ILLNESS  Rafi Hansen is a 39 y.o. female. HPI  Patient complains of itching bilateral lower legs for about 1 week. She has applied topical Hydrocortisone without relief. She works at [employer]. She has no other complaints. She denies known insect bites or other rashes. Denies new soaps, creams, or detergents. History reviewed. No pertinent past medical history. Current Outpatient Prescriptions on File Prior to Visit   Medication Sig Dispense Refill    NORETH-ETHINYL ESTRADIOL-IRON PO Take  by mouth. No current facility-administered medications on file prior to visit. Allergies   Allergen Reactions    Sulfa (Sulfonamide Antibiotics) Rash      Social History     Social History    Marital status: SINGLE     Spouse name: N/A    Number of children: N/A    Years of education: N/A     Occupational History    Not on file. Social History Main Topics    Smoking status: Never Smoker    Smokeless tobacco: Never Used    Alcohol use Yes    Drug use: No    Sexual activity: Yes     Birth control/ protection: Pill     Other Topics Concern    Not on file     Social History Narrative      ROS  Per HPI  Physical Exam   Visit Vitals    /64 (BP 1 Location: Right arm, BP Patient Position: Sitting)    Pulse 93    Temp 98.7 °F (37.1 °C) (Oral)    Resp 18    Ht 5' 3\" (1.6 m)    Wt 123 lb 3.2 oz (55.9 kg)    LMP Comment: no cycles due to birth control    SpO2 98%  Comment: RA    BMI 21.82 kg/m2    Patient appears well  Heart[de-identified] normal rate, regular rhythm, normal S1, S2, no murmurs, rubs, clicks or gallops. Chest: clear to auscultation, no wheezes, rales or rhonchi,   Extremities: no edema  Diffusely prominent mildly erythematous follicles bilateral lower legs. No papules, pustules or vesicles. No increased warmth or surrounding erythema. ASSESSMENT and PLAN  Dermatitis ?  Folliculitis   Topical Elocon cream bid x 7 days Rx 30 g, 1 RF  Claritin or Allegra 1 daily for allergic component  Avoid shaving for next week, and call or return to clinic if these symptoms worsen or fail to improve as anticipated. Patient will keep her previously scheduled appointment with Stephany Ibrahim MD on 5/26/17       Follow-up Disposition:  Return if symptoms worsen or fail to improve. Advised to call back or return to office if symptoms worsen/change/persist.  Discussed expected course/resolution/complications of diagnosis in detail with patient. Medication risks/benefits/costs/interactions/alternatives discussed with patient. She was given an after visit summary which includes diagnoses, current medications, & vitals. She expressed understanding with the diagnosis and plan.

## 2017-05-26 ENCOUNTER — OFFICE VISIT (OUTPATIENT)
Dept: INTERNAL MEDICINE CLINIC | Age: 37
End: 2017-05-26

## 2017-05-26 VITALS
SYSTOLIC BLOOD PRESSURE: 100 MMHG | RESPIRATION RATE: 16 BRPM | OXYGEN SATURATION: 97 % | WEIGHT: 120.2 LBS | DIASTOLIC BLOOD PRESSURE: 70 MMHG | HEIGHT: 63 IN | TEMPERATURE: 98.4 F | HEART RATE: 72 BPM | BODY MASS INDEX: 21.3 KG/M2

## 2017-05-26 DIAGNOSIS — E80.6 HYPERBILIRUBINEMIA: ICD-10-CM

## 2017-05-26 DIAGNOSIS — E78.2 MIXED HYPERLIPIDEMIA: ICD-10-CM

## 2017-05-26 DIAGNOSIS — Z00.00 WELL WOMAN EXAM (NO GYNECOLOGICAL EXAM): Primary | ICD-10-CM

## 2017-05-26 DIAGNOSIS — E55.9 VITAMIN D INSUFFICIENCY: ICD-10-CM

## 2017-05-26 RX ORDER — NORETHINDRONE AND ETHINYL ESTRADIOL 0.8-25(24)
KIT ORAL
Refills: 12 | COMMUNITY
Start: 2017-05-09 | End: 2018-09-13 | Stop reason: SDUPTHER

## 2017-05-26 RX ORDER — OMEGA-3-ACID ETHYL ESTERS 1 G/1
2 CAPSULE, LIQUID FILLED ORAL 2 TIMES DAILY
Qty: 60 CAP | Refills: 4 | Status: SHIPPED | OUTPATIENT
Start: 2017-05-26 | End: 2018-01-23

## 2017-05-26 NOTE — PROGRESS NOTES
Lab review  High Cholesterol   Continue to stay physically active (>150mins/ week of moderate activity) and eat a healthy diet low in saturated fat (<5% daily) and high in fruits and vegetables (>50% daily). (Recommendation: Start Fish Oil Supplement, reduce carbs to 150-200g/ day and the Mediterranean Diet). -Your vitamin D is a little low. Start taking an over the counter vitamin D supplement 800-1000 IU/ once a day now.      The remainder of your labs were normal. Some labs that may have been tested and their explanation are:  Your electrolytes, kidney & liver function (Metabolic Panel)   Anemia, blood cells (CBC)  Thyroid (TSH + T4, T3)  Hormones (prolactin, vitamin D )   Diabetes (Hemoglobin A1c)

## 2017-05-26 NOTE — PATIENT INSTRUCTIONS
It was a pleasure to see you! As discussed:    Lab review  High Cholesterol   Continue to stay physically active (>150mins/ week of moderate activity) and eat a healthy diet low in saturated fat (<5% daily) and high in fruits and vegetables (>50% daily). (Recommendation: Start Fish Oil Supplement, reduce carbs to 150-200g/ day and the Mediterranean Diet). -Your vitamin D is a little low. Start taking an over the counter vitamin D supplement 800-1000 IU/ once a day now. The remainder of your labs were normal. Some labs that may have been tested and their explanation are:  Your electrolytes, kidney & liver function (Metabolic Panel)   Anemia, blood cells (CBC)  Thyroid (TSH + T4, T3)  Hormones (prolactin, vitamin D )   Diabetes (Hemoglobin A1c)          Mediterranean Diet: Care Instructions  Your Care Instructions  The Mediterranean diet features foods eaten in [place], [place], [place] and [place], and other countries that border the [facility]. It emphasizes eating a diet rich in fruits, vegetables, nuts, and high-fiber grains, and limits meat, cheese, and sweets. The Mediterranean diet may:  · Prevent heart disease and lower the risk of a heart attack or stroke. · Prevent type 2 diabetes. · Prevent Alzheimer's disease and other dementia. · Prevent depression. · Prevent Parkinson's disease. This diet contains more fat than other heart-healthy diets. But the fats are mainly from nuts, unsaturated oils, such as fish oils, olive oil, and certain nut or seed oils (such as canola, soybean, or flaxseed oil). These types of oils may help protect the heart and blood vessels. Follow-up care is a key part of your treatment and safety. Be sure to make and go to all appointments, and call your doctor if you are having problems. It's also a good idea to know your test results and keep a list of the medicines you take. How can you care for yourself at home?   What to eat  · Eat a variety of fruits and vegetables each day, such as grapes, blueberries, tomatoes, broccoli, peppers, figs, olives, spinach, eggplant, beans, lentils, and chickpeas. · Eat a variety of whole-grain foods each day, such as oats, brown rice, and whole wheat bread, pasta, and couscous. · Eat fish at least 2 times a week. Try tuna, salmon, mackerel, lake trout, herring, or sardines. · Eat moderate amounts of low-fat dairy products, such as milk, cheese, or yogurt. · Eat moderate amounts of poultry and eggs. · Choose healthy (unsaturated) fats, such as nuts, olive oil and certain nut or seed oils like canola, soybean, and flaxseed. · Limit unhealthy (saturated) fats, such as butter, palm oil, and coconut oil. And limit fats found in animal products, such as meat and dairy products made with whole milk. Try to eat red meat only a few times a month in very small amounts. · Limit sweets and desserts to only a few times a week. This includes sugar-sweetened drinks like soda. The Mediterranean diet may also include red wine with your meal--1 glass each day for women and up to 2 glasses a day for men. Tips for changing your diet  · Dip bread in a mix of olive oil and fresh herbs instead of using butter. · Add avocado slices to your sandwich instead of matos. · Have fish for lunch or dinner instead of red meat. Brush the fish with olive oil, and broil or grill it. · Sprinkle your salad with seeds or nuts instead of cheese. · Cook with olive or canola oil instead of butter or oils that are high in saturated fat. · Switch from 2% milk or whole milk to 1% or fat-free milk. · Dip raw vegetables in a vinaigrette dressing or hummus instead of dips made from mayonnaise or sour cream.  · Have a piece of fruit for dessert instead of a piece of cake. Try baked apples, or have some dried fruit. Part of the Mediterranean diet is being active. Get at least 30 minutes of exercise on most days of the week. Walking is a good choice.  You also may want to do other activities, such as running, swimming, cycling, or playing tennis or team sports. Where can you learn more? Go to http://cristina-mily.info/. Enter O407 in the search box to learn more about \"Mediterranean Diet: Care Instructions. \"  Current as of: October 21, 2016  Content Version: 11.2  © 5433-8389 [employer]. Care instructions adapted under license by [employer] (which disclaims liability or warranty for this information). If you have questions about a medical condition or this instruction, always ask your healthcare professional. [place] [address] any warranty or liability for your use of this information.

## 2017-05-26 NOTE — LETTER
5/26/2017 9:04 AM 
 
Ms. [address] [facility] [address] Lab review High Cholesterol Continue to stay physically active (>150mins/ week of moderate activity) and eat a healthy diet low in saturated fat (<5% daily) and high in fruits and vegetables (>50% daily). (Recommendation: Start Fish Oil Supplement, reduce carbs to 150-200g/ day and the Mediterranean Diet). -Your vitamin D is a little low. Start taking an over the counter vitamin D supplement 800-1000 IU/ once a day now. The remainder of your labs were normal. Some labs that may have been tested and their explanation are: 
Your electrolytes, kidney & liver function (Metabolic Panel) Anemia, blood cells (CBC) Thyroid (TSH + T4, T3) Hormones (prolactin, vitamin D ) Diabetes (Hemoglobin A1c) Resulted Orders CBC WITH AUTOMATED DIFF Result Value Ref Range WBC 6.2 3.4 - 10.8 x10E3/uL  
 RBC 4.95 3.77 - 5.28 x10E6/uL HGB 15.3 11.1 - 15.9 g/dL HCT 45.6 34.0 - 46.6 % MCV 92 79 - 97 fL  
 MCH 30.9 26.6 - 33.0 pg  
 MCHC 33.6 31.5 - 35.7 g/dL  
 RDW 12.6 12.3 - 15.4 % PLATELET 618 178 - 416 x10E3/uL NEUTROPHILS 63 % Lymphocytes 29 % MONOCYTES 6 % EOSINOPHILS 2 % BASOPHILS 0 %  
 ABS. NEUTROPHILS 3.9 1.4 - 7.0 x10E3/uL Abs Lymphocytes 1.8 0.7 - 3.1 x10E3/uL  
 ABS. MONOCYTES 0.4 0.1 - 0.9 x10E3/uL  
 ABS. EOSINOPHILS 0.1 0.0 - 0.4 x10E3/uL  
 ABS. BASOPHILS 0.0 0.0 - 0.2 x10E3/uL IMMATURE GRANULOCYTES 0 %  
 ABS. IMM. GRANS. 0.0 0.0 - 0.1 x10E3/uL Narrative Performed at:  [address]  [id number] : Carol Aggarwal MD, Phone:  [phone] LIPID PANEL Result Value Ref Range Cholesterol, total 253 (H) 100 - 199 mg/dL Triglyceride 70 0 - 149 mg/dL HDL Cholesterol 72 >39 mg/dL VLDL, calculated 14 5 - 40 mg/dL LDL, calculated 167 (H) 0 - 99 mg/dL Narrative Performed at:  [place] [address] [address]  [id number] : Liliana Lazaro MD, Phone:  [phone] METABOLIC PANEL, COMPREHENSIVE Result Value Ref Range Glucose 84 65 - 99 mg/dL BUN 12 6 - 20 mg/dL Creatinine 0.89 0.57 - 1.00 mg/dL GFR est non-AA 84 >59 mL/min/1.73 GFR est AA 96 >59 mL/min/1.73  
 BUN/Creatinine ratio 13 9 - 23 Sodium 137 134 - 144 mmol/L Potassium 4.1 3.5 - 5.2 mmol/L Chloride 97 96 - 106 mmol/L  
 CO2 24 18 - 29 mmol/L Calcium 9.9 8.7 - 10.2 mg/dL Protein, total 8.3 6.0 - 8.5 g/dL Albumin 5.2 3.5 - 5.5 g/dL GLOBULIN, TOTAL 3.1 1.5 - 4.5 g/dL A-G Ratio 1.7 1.2 - 2.2 Bilirubin, total 2.4 (H) 0.0 - 1.2 mg/dL Alk. phosphatase 68 39 - 117 IU/L  
 AST (SGOT) 27 0 - 40 IU/L  
 ALT (SGPT) 23 0 - 32 IU/L Narrative Performed at:  [address]  [id number] : Liliana Lazaro MD, Phone:  [phone] THYROID PANEL Result Value Ref Range T4, Total 11.3 4.5 - 12.0 ug/dL  
 T3 Uptake 22 (L) 24 - 39 % Free Thyroxine Index 2.5 1.2 - 4.9 Narrative Performed at:  [address]  [id number] : Liliana Lazaro MD, Phone:  [phone] TSH 3RD GENERATION Result Value Ref Range TSH 0.795 0.450 - 4.500 uIU/mL Narrative Performed at:  [address]  [id number] : Liliana Lazaro MD, Phone:  [phone] VITAMIN D, 25 HYDROXY Result Value Ref Range VITAMIN D, 25-HYDROXY 29.8 (L) 30.0 - 100.0 ng/mL Comment:  
   Vitamin D deficiency has been defined by the [facility] practice guideline as a 
level of serum 25-OH vitamin D less than 20 ng/mL (1,2). The Endocrine Society went on to further define vitamin D 
insufficiency as a level between 21 and 29 ng/mL (2). 1. IOM ([place] of Medicine). 2010. Dietary reference intakes for calcium and D. [address] [place] [address]: The 
   [employer]. 2. Uriel MF, Franchesca NC, Suzanne RASHID, et al. 
   Evaluation, treatment, and prevention of vitamin D 
   deficiency: an Endocrine Society clinical practice 
   guideline. JCEM. 2011 Jul; 96(7):1911-30. Narrative Performed at:  [address]  [id number] : Adelia Reeder MD, Phone:  [phone] HEMOGLOBIN A1C WITH EAG Result Value Ref Range Hemoglobin A1c 5.4 4.8 - 5.6 % Comment:  
            Pre-diabetes: 5.7 - 6.4 Diabetes: >6.4 Glycemic control for adults with diabetes: <7.0 Estimated average glucose 108 mg/dL Narrative Performed at:  [address]  [id number] : Adelia Reeder MD, Phone:  [phone] URINALYSIS W/MICROSCOPIC Result Value Ref Range Specific Gravity 1.026 1.005 - 1.030  
 pH (UA) 6.5 5.0 - 7.5 Color Yellow Yellow Appearance Clear Clear Leukocyte Esterase Negative Negative Protein Negative Negative/Trace Glucose Negative Negative Ketone Negative Negative Blood Negative Negative Bilirubin Negative Negative Urobilinogen 0.2 0.2 - 1.0 mg/dL Nitrites Negative Negative Microscopic Examination Comment Comment:  
   Microscopic follows if indicated. Microscopic exam See additional order Comment:  
   Microscopic was indicated and was performed. Narrative Performed at:  [address]  [id number] : Adelia Reeder MD, Phone:  [phone] MICROSCOPIC EXAMINATION Result Value Ref Range WBC 0-5 0 - 5 /hpf  
 RBC 0-2 0 - 2 /hpf Epithelial cells 0-10 0 - 10 /hpf Casts None seen None seen /lpf Mucus Present Not Estab. Bacteria Few None seen/Few Narrative Performed at:  [place] [address] [facility] [address] [place] [address]  [id number] : Colby Foote MD, Phone:  [phone]

## 2017-05-26 NOTE — MR AVS SNAPSHOT
Visit Information Date & Time Provider Department Dept. Phone Encounter #  
 5/26/2017  8:00 AM Vanessa Murry MD [address] [address] Internal Medicine [phone] [id number] Follow-up Instructions Return in about 6 months (around 11/26/2017) for Follow-up HLD . Upcoming Health Maintenance Date Due INFLUENZA AGE 9 TO ADULT 8/1/2017 PAP AKA CERVICAL CYTOLOGY 6/14/2018 DTaP/Tdap/Td series (2 - Td) 1/1/2024 Allergies as of 5/26/2017  Review Complete On: 5/26/2017 By: Vanessa Murry MD  
  
 Severity Noted Reaction Type Reactions Sulfa (Sulfonamide Antibiotics)  01/26/2017    Rash Current Immunizations  Reviewed on 5/9/2017 No immunizations on file. Not reviewed this visit You Were Diagnosed With   
  
 Codes Comments Well woman exam (no gynecological exam)    -  Primary ICD-10-CM: Z00.00 ICD-9-CM: V70.0 Mixed hyperlipidemia     ICD-10-CM: E78.2 ICD-9-CM: 272.2 Vitamin D insufficiency     ICD-10-CM: E55.9 ICD-9-CM: 268.9 Hyperbilirubinemia     ICD-10-CM: E80.6 ICD-9-CM: 228. 4 Vitals BP Pulse Temp Resp Height(growth percentile) Weight(growth percentile) 100/70 (BP 1 Location: Right arm, BP Patient Position: Sitting) 72 98.4 °F (36.9 °C) (Oral) 16 5' 3.35\" (1.609 m) 120 lb 3.2 oz (54.5 kg) SpO2 BMI OB Status Smoking Status 97% 21.06 kg/m2 Medically Induced Never Smoker Vitals History BMI and BSA Data Body Mass Index Body Surface Area 21.06 kg/m 2 1.56 m 2 Preferred Pharmacy Pharmacy Name Phone [id number] [phone] Your Updated Medication List  
  
   
This list is accurate as of: 5/26/17  9:03 AM.  Always use your most recent med list.  
  
  
  
  
 mometasone 0.1 % topical cream  
Commonly known as:  Ursula Puente Apply  to affected area two (2) times a day. * NORETH-ETHINYL ESTRADIOL-IRON PO Take  by mouth. * KAITLIB FE 0.8mg-25mcg(24) and 75 mg (4) Chew Generic drug:  noreth-ethinyl estradiol-iron TAKE 1 TABLET BY MOUTH EVERY DAY  
  
 omega-3 acid ethyl esters 1 gram capsule Commonly known as:  Mila Cody Take 2 Caps by mouth two (2) times a day. * Notice: This list has 2 medication(s) that are the same as other medications prescribed for you. Read the directions carefully, and ask your doctor or other care provider to review them with you. Prescriptions Sent to Pharmacy Refills  
 omega-3 acid ethyl esters (LOVAZA) 1 gram capsule 4 Sig: Take 2 Caps by mouth two (2) times a day. Class: Normal  
 Pharmacy: [address] Ph #: [phone] Route: Oral  
  
We Performed the Following BILIRUBIN, FRACTIONATED V4765428 CPT(R)] LIPID PANEL [48341 CPT(R)] METABOLIC PANEL, COMPREHENSIVE [18511 CPT(R)] Follow-up Instructions Return in about 6 months (around 11/26/2017) for Follow-up HLD . Patient Instructions It was a pleasure to see you! As discussed: 
 
Lab review High Cholesterol Continue to stay physically active (>150mins/ week of moderate activity) and eat a healthy diet low in saturated fat (<5% daily) and high in fruits and vegetables (>50% daily). (Recommendation: Start Fish Oil Supplement, reduce carbs to 150-200g/ day and the Mediterranean Diet). -Your vitamin D is a little low. Start taking an over the counter vitamin D supplement 800-1000 IU/ once a day now. The remainder of your labs were normal. Some labs that may have been tested and their explanation are: 
Your electrolytes, kidney & liver function (Metabolic Panel) Anemia, blood cells (CBC) Thyroid (TSH + T4, T3) Hormones (prolactin, vitamin D ) Diabetes (Hemoglobin A1c) Mediterranean Diet: Care Instructions Your Care Instructions The Mediterranean diet features foods eaten in [place], [place], [place] and [place], and other countries that border the [facility]. It emphasizes eating a diet rich in fruits, vegetables, nuts, and high-fiber grains, and limits meat, cheese, and sweets. The Mediterranean diet may: · Prevent heart disease and lower the risk of a heart attack or stroke. · Prevent type 2 diabetes. · Prevent Alzheimer's disease and other dementia. · Prevent depression. · Prevent Parkinson's disease. This diet contains more fat than other heart-healthy diets. But the fats are mainly from nuts, unsaturated oils, such as fish oils, olive oil, and certain nut or seed oils (such as canola, soybean, or flaxseed oil). These types of oils may help protect the heart and blood vessels. Follow-up care is a key part of your treatment and safety. Be sure to make and go to all appointments, and call your doctor if you are having problems. It's also a good idea to know your test results and keep a list of the medicines you take. How can you care for yourself at home? What to eat · Eat a variety of fruits and vegetables each day, such as grapes, blueberries, tomatoes, broccoli, peppers, figs, olives, spinach, eggplant, beans, lentils, and chickpeas. · Eat a variety of whole-grain foods each day, such as oats, brown rice, and whole wheat bread, pasta, and couscous. · Eat fish at least 2 times a week. Try tuna, salmon, mackerel, lake trout, herring, or sardines. · Eat moderate amounts of low-fat dairy products, such as milk, cheese, or yogurt. · Eat moderate amounts of poultry and eggs. · Choose healthy (unsaturated) fats, such as nuts, olive oil and certain nut or seed oils like canola, soybean, and flaxseed. · Limit unhealthy (saturated) fats, such as butter, palm oil, and coconut oil. And limit fats found in animal products, such as meat and dairy products made with whole milk. Try to eat red meat only a few times a month in very small amounts. · Limit sweets and desserts to only a few times a week. This includes sugar-sweetened drinks like soda. The Mediterranean diet may also include red wine with your meal1 glass each day for women and up to 2 glasses a day for men. Tips for changing your diet · Dip bread in a mix of olive oil and fresh herbs instead of using butter. · Add avocado slices to your sandwich instead of matos. · Have fish for lunch or dinner instead of red meat. Brush the fish with olive oil, and broil or grill it. · Sprinkle your salad with seeds or nuts instead of cheese. · Cook with olive or canola oil instead of butter or oils that are high in saturated fat. · Switch from 2% milk or whole milk to 1% or fat-free milk. · Dip raw vegetables in a vinaigrette dressing or hummus instead of dips made from mayonnaise or sour cream. 
· Have a piece of fruit for dessert instead of a piece of cake. Try baked apples, or have some dried fruit. Part of the Mediterranean diet is being active. Get at least 30 minutes of exercise on most days of the week. Walking is a good choice. You also may want to do other activities, such as running, swimming, cycling, or playing tennis or team sports. Where can you learn more? Go to http://cristina-mily.info/. Enter O407 in the search box to learn more about \"Mediterranean Diet: Care Instructions. \" Current as of: October 21, 2016 Content Version: 11.2 © 2371-9982 [employer]. Care instructions adapted under license by [employer] (which disclaims liability or warranty for this information). If you have questions about a medical condition or this instruction, always ask your healthcare professional. [place] [address] any warranty or liability for your use of this information. Introducing hospitals & HEALTH SERVICES!    
 [facility] introduces [employer] patient portal. Now you can access parts of your medical record, email your doctor's office, and request medication refills online. 1. In your internet browser, go to [email or web address] 2. Click on the First Time User? Click Here link in the Sign In box. You will see the New Member Sign Up page. 3. Enter your GoCardless Access Code exactly as it appears below. You will not need to use this code after youve completed the sign-up process. If you do not sign up before the expiration date, you must request a new code. · GoCardless Access Code: [email or web address] Expires: 8/7/2017  9:47 AM 
 
4. Enter the last four digits of your Social Security Number (xxxx) and Date of Birth (mm/dd/yyyy) as indicated and click Submit. You will be taken to the next sign-up page. 5. Create a GoCardless ID. This will be your GoCardless login ID and cannot be changed, so think of one that is secure and easy to remember. 6. Create a GoCardless password. You can change your password at any time. 7. Enter your Password Reset Question and Answer. This can be used at a later time if you forget your password. 8. Enter your e-mail address. You will receive e-mail notification when new information is available in [address] 9. Click Sign Up. You can now view and download portions of your medical record. 10. Click the Download Summary menu link to download a portable copy of your medical information. If you have questions, please visit the Frequently Asked Questions section of the Go[employer]less website. Remember, GoCardless is NOT to be used for urgent needs. For medical emergencies, dial 911. Now available from your iPhone and Android! Please provide this summary of care documentation to your next provider. Your primary care clinician is listed as Lilly Lamb. If you have any questions after today's visit, please call [phone].

## 2017-05-26 NOTE — PROGRESS NOTES
Chief Complaint   Patient presents with    Complete Physical     1. Have you been to the ER, urgent care clinic since your last visit? Hospitalized since your last visit? No    2. Have you seen or consulted any other health care providers outside of the [address] [place] [address] since your last visit? Include any pap smears or colon screening.  No

## 2017-06-01 ENCOUNTER — TELEPHONE (OUTPATIENT)
Dept: INTERNAL MEDICINE CLINIC | Age: 37
End: 2017-06-01

## 2017-06-01 NOTE — TELEPHONE ENCOUNTER
Left message for pt to return call regarding omega 3 ethyl 1 gm caps. Received notification form [facility] [phone] saying will need prior auth. Or alternates given. Per drs orders to inform pt to take OTC fish oil caps.

## 2017-06-01 NOTE — TELEPHONE ENCOUNTER
----- Message from Zana Gil sent at 6/1/2017  3:42 PM EDT -----  Regarding: l-returning your call   [phone]

## 2017-09-18 ENCOUNTER — OFFICE VISIT (OUTPATIENT)
Dept: INTERNAL MEDICINE CLINIC | Age: 37
End: 2017-09-18

## 2017-09-18 VITALS
TEMPERATURE: 98.7 F | RESPIRATION RATE: 16 BRPM | OXYGEN SATURATION: 97 % | SYSTOLIC BLOOD PRESSURE: 108 MMHG | HEART RATE: 86 BPM | HEIGHT: 63 IN | BODY MASS INDEX: 20.98 KG/M2 | WEIGHT: 118.4 LBS | DIASTOLIC BLOOD PRESSURE: 66 MMHG

## 2017-09-18 DIAGNOSIS — J02.0 STREP THROAT: Primary | ICD-10-CM

## 2017-09-18 DIAGNOSIS — J02.9 SORE THROAT: ICD-10-CM

## 2017-09-18 LAB
S PYO AG THROAT QL: POSITIVE
VALID INTERNAL CONTROL?: YES

## 2017-09-18 RX ORDER — AMOXICILLIN 500 MG/1
500 CAPSULE ORAL 2 TIMES DAILY
Qty: 20 CAP | Refills: 0 | Status: SHIPPED | OUTPATIENT
Start: 2017-09-18 | End: 2017-09-18 | Stop reason: SDUPTHER

## 2017-09-18 RX ORDER — AMOXICILLIN 500 MG/1
500 CAPSULE ORAL 2 TIMES DAILY
Qty: 20 CAP | Refills: 0 | Status: SHIPPED | OUTPATIENT
Start: 2017-09-18 | End: 2017-09-28

## 2017-09-18 NOTE — PROGRESS NOTES
HISTORY OF PRESENT ILLNESS  Piero Taylor is a 39 y.o. female. Sore Throat    The current episode started more than 1 week ago (2 weeks ). There has been no fever. Associated symptoms include congestion (9/18/17 only ), ear pain (right ), plugged ear sensation and trouble swallowing. Pertinent negatives include no headaches, no stiff neck and no cough. She has tried NSAIDs for the symptoms. Review of Systems   HENT: Positive for congestion (9/18/17 only ), ear pain (right ), sore throat and trouble swallowing. Respiratory: Negative for cough. Neurological: Negative for headaches. Patient Active Problem List    Diagnosis Date Noted    Mixed hyperlipidemia 05/26/2017    Vitamin D insufficiency 05/26/2017    Seasonal affective disorder ([facility] 01/26/2017    [facility] 01/26/2017    Family history of renal cancer 01/26/2017       Current Outpatient Prescriptions   Medication Sig Dispense Refill    KAITLIB FE 0.8mg-25mcg(24) and 75 mg (4) chew TAKE 1 TABLET BY MOUTH EVERY DAY  12    omega-3 acid ethyl esters (LOVAZA) 1 gram capsule Take 2 Caps by mouth two (2) times a day. 60 Cap 4    mometasone (ELOCON) 0.1 % topical cream Apply  to affected area two (2) times a day. 30 g 1    NORETH-ETHINYL ESTRADIOL-IRON PO Take  by mouth. Allergies   Allergen Reactions    Sulfa (Sulfonamide Antibiotics) Rash      Visit Vitals    /66 (BP 1 Location: Right arm, BP Patient Position: Sitting)    Pulse 86    Temp 98.7 °F (37.1 °C) (Oral)    Resp 16    Ht 5' 3.35\" (1.609 m)    Wt 118 lb 6.4 oz (53.7 kg)    SpO2 97%    BMI 20.74 kg/m2         Physical Exam   Constitutional: She is oriented to person, place, and time. She appears well-developed. No distress. HENT:   Nose: No mucosal edema, rhinorrhea or septal deviation. Right sinus exhibits no maxillary sinus tenderness and no frontal sinus tenderness.  Left sinus exhibits no maxillary sinus tenderness and no frontal sinus tenderness. Eyes: Conjunctivae are normal.   Neck: Neck supple. Cardiovascular: Normal rate, regular rhythm and normal heart sounds. Pulmonary/Chest: Effort normal and breath sounds normal. No respiratory distress. She has no wheezes. She has no rales. She exhibits no tenderness. Lymphadenopathy:     She has cervical adenopathy (tonsilar and anterior ). Neurological: She is alert and oriented to person, place, and time. Psychiatric: She has a normal mood and affect. Recent Results (from the past 12 hour(s))   AMB POC RAPID STREP A    Collection Time: 09/18/17  2:00 PM   Result Value Ref Range    VALID INTERNAL CONTROL POC Yes     Group A Strep Ag Positive Negative       ASSESSMENT and PLAN  Diagnoses and all orders for this visit:    1. Strep throat- Will treat for 10 days given delayed diagnosis. Red flags to warrant ER or earlier clinical evaluation reviewed. See AVS for full details of plan and patient discussion.     -     amoxicillin (AMOXIL) 500 mg capsule; Take 1 Cap by mouth two (2) times a day for 10 days. 2. Sore throat  -     AMB POC RAPID STREP A      Follow-up Disposition:  Return if symptoms worsen or fail to improve. Medication risks/benefits/costs/interactions/alternatives discussed with patient. Rebeca Mendoza  was given an after visit summary which includes diagnoses, current medications, & vitals. she expressed understanding with the diagnosis and plan.

## 2017-09-18 NOTE — PATIENT INSTRUCTIONS
It was a pleasure to see you! As discussed:         Strep Throat: Care Instructions  Your Care Instructions    Strep throat is a bacterial infection that causes sudden, severe sore throat and fever. Strep throat, which is caused by bacteria called streptococcus, is treated with antibiotics. Sometimes a strep test is necessary to tell if the sore throat is caused by strep bacteria. Treatment can help ease symptoms and may prevent future problems. Follow-up care is a key part of your treatment and safety. Be sure to make and go to all appointments, and call your doctor if you are having problems. It's also a good idea to know your test results and keep a list of the medicines you take. How can you care for yourself at home? · Take your antibiotics as directed. Do not stop taking them just because you feel better. You need to take the full course of antibiotics. · Strep throat can spread to others until 24 hours after you begin taking antibiotics. During this time, you should avoid contact with other people at work or home, especially infants and children. Do not sneeze or cough on others, and wash your hands often. Keep your drinking glass and eating utensils separate from those of others, and wash these items well in hot, soapy water. · Gargle with warm salt water at least once each hour to help reduce swelling and make your throat feel better. Use 1 teaspoon of salt mixed in 8 fluid ounces of warm water. · Take an over-the-counter pain medication, such as acetaminophen (Tylenol), ibuprofen (Advil, Motrin), or naproxen (Aleve). Read and follow all instructions on the label. · Try an over-the-counter anesthetic throat spray or throat lozenges, which may help relieve throat pain. · Drink plenty of fluids. Fluids may help soothe an irritated throat. Hot fluids, such as tea or soup, may help your throat feel better. · Eat soft solids and drink plenty of clear liquids.  Flavored ice pops, ice cream, scrambled eggs, sherbet, and gelatin dessert (such as Jell-O) may also soothe the throat. · Get lots of rest.  · Do not smoke, and avoid secondhand smoke. If you need help quitting, talk to your doctor about stop-smoking programs and medicines. These can increase your chances of quitting for good. · Use a vaporizer or humidifier to add moisture to the air in your bedroom. Follow the directions for cleaning the machine. When should you call for help? Call your doctor now or seek immediate medical care if:  · You have a new or higher fever. · You have a fever with a stiff neck or severe headache. · You have new or worse trouble swallowing. · Your sore throat gets much worse on one side. · Your pain becomes much worse on one side of your throat. Watch closely for changes in your health, and be sure to contact your doctor if:  · You are not getting better after 2 days (48 hours). · You do not get better as expected. Where can you learn more? Go to http://cristina-mily.info/. Enter K625 in the search box to learn more about \"Strep Throat: Care Instructions. \"  Current as of: July 29, 2016  Content Version: 11.3  © 8952-9011 [employer]. Care instructions adapted under license by "[employer]" (which disclaims liability or warranty for this information). If you have questions about a medical condition or this instruction, always ask your healthcare professional. [place] [address] any warranty or liability for your use of this information.

## 2017-09-18 NOTE — PROGRESS NOTES
Chief Complaint   Patient presents with    Sore Throat       1. Have you been to the ER, urgent care clinic since your last visit? Hospitalized since your last visit? No    2. Have you seen or consulted any other health care providers outside of the [address] [place] [address] since your last visit? Include any pap smears or colon screening.  No    Sore throat x 2 weeks

## 2017-09-18 NOTE — MR AVS SNAPSHOT
Visit Information Date & Time Provider Department Dept. Phone Encounter #  
 9/18/2017  1:45 PM Colletta Stains, MD [facility] [phone] [id number] Follow-up Instructions Return if symptoms worsen or fail to improve. Your Appointments 11/30/2017  9:30 AM  
ROUTINE CARE with Colletta Stains, MD  
[address] [facility] [facility]) Appt Note: 6mo f/u  
 [address] [facility] [address] [facility] access hospital [address]  
[address] [address] [place] [address] Upcoming Health Maintenance Date Due INFLUENZA AGE 9 TO ADULT 8/1/2017 PAP AKA CERVICAL CYTOLOGY 6/14/2018 DTaP/Tdap/Td series (2 - Td) 1/1/2024 Allergies as of 9/18/2017  Review Complete On: 9/18/2017 By: Colletta Stains, MD  
  
 Severity Noted Reaction Type Reactions Sulfa (Sulfonamide Antibiotics)  01/26/2017    Rash Current Immunizations  Reviewed on 5/9/2017 No immunizations on file. Not reviewed this visit You Were Diagnosed With   
  
 Codes Comments Strep throat    -  Primary ICD-10-CM: J02.0 ICD-9-CM: 034.0 Sore throat     ICD-10-CM: J02.9 ICD-9-CM: 045 Vitals BP Pulse Temp Resp Height(growth percentile) Weight(growth percentile) 108/66 (BP 1 Location: Right arm, BP Patient Position: Sitting) 86 98.7 °F (37.1 °C) (Oral) 16 5' 3.35\" (1.609 m) 118 lb 6.4 oz (53.7 kg) SpO2 BMI OB Status Smoking Status 97% 20.74 kg/m2 Medically Induced Never Smoker Vitals History BMI and BSA Data Body Mass Index Body Surface Area 20.74 kg/m 2 1.55 m 2 Preferred Pharmacy Pharmacy Name Phone [id number] [phone] Your Updated Medication List  
  
   
This list is accurate as of: 9/18/17  2:23 PM.  Always use your most recent med list.  
  
  
  
  
 amoxicillin 500 mg capsule Commonly known as:  AMOXIL Take 1 Cap by mouth two (2) times a day for 10 days. mometasone 0.1 % topical cream  
Commonly known as:  Willis Loron Apply  to affected area two (2) times a day. * NORETH-ETHINYL ESTRADIOL-IRON PO Take  by mouth. * KAITLIB FE 0.8mg-25mcg(24) and 75 mg (4) Chew Generic drug:  noreth-ethinyl estradiol-iron TAKE 1 TABLET BY MOUTH EVERY DAY  
  
 omega-3 acid ethyl esters 1 gram capsule Commonly known as:  Lendon Bent Take 2 Caps by mouth two (2) times a day. * Notice: This list has 2 medication(s) that are the same as other medications prescribed for you. Read the directions carefully, and ask your doctor or other care provider to review them with you. Prescriptions Sent to Pharmacy Refills  
 amoxicillin (AMOXIL) 500 mg capsule 0 Sig: Take 1 Cap by mouth two (2) times a day for 10 days. Class: Normal  
 Pharmacy: [address] Ph #: [phone] Route: Oral  
  
We Performed the Following AMB POC RAPID STREP A [55842 CPT(R)] Follow-up Instructions Return if symptoms worsen or fail to improve. Patient Instructions It was a pleasure to see you! As discussed: 
 
 
  
Strep Throat: Care Instructions Your Care Instructions Strep throat is a bacterial infection that causes sudden, severe sore throat and fever. Strep throat, which is caused by bacteria called streptococcus, is treated with antibiotics. Sometimes a strep test is necessary to tell if the sore throat is caused by strep bacteria. Treatment can help ease symptoms and may prevent future problems. Follow-up care is a key part of your treatment and safety. Be sure to make and go to all appointments, and call your doctor if you are having problems. It's also a good idea to know your test results and keep a list of the medicines you take. How can you care for yourself at home? · Take your antibiotics as directed. Do not stop taking them just because you feel better. You need to take the full course of antibiotics. · Strep throat can spread to others until 24 hours after you begin taking antibiotics. During this time, you should avoid contact with other people at work or home, especially infants and children. Do not sneeze or cough on others, and wash your hands often. Keep your drinking glass and eating utensils separate from those of others, and wash these items well in hot, soapy water. · Gargle with warm salt water at least once each hour to help reduce swelling and make your throat feel better. Use 1 teaspoon of salt mixed in 8 fluid ounces of warm water. · Take an over-the-counter pain medication, such as acetaminophen (Tylenol), ibuprofen (Advil, Motrin), or naproxen (Aleve). Read and follow all instructions on the label. · Try an over-the-counter anesthetic throat spray or throat lozenges, which may help relieve throat pain. · Drink plenty of fluids. Fluids may help soothe an irritated throat. Hot fluids, such as tea or soup, may help your throat feel better. · Eat soft solids and drink plenty of clear liquids. Flavored ice pops, ice cream, scrambled eggs, sherbet, and gelatin dessert (such as Jell-O) may also soothe the throat. · Get lots of rest. 
· Do not smoke, and avoid secondhand smoke. If you need help quitting, talk to your doctor about stop-smoking programs and medicines. These can increase your chances of quitting for good. · Use a vaporizer or humidifier to add moisture to the air in your bedroom. Follow the directions for cleaning the machine. When should you call for help? Call your doctor now or seek immediate medical care if: 
· You have a new or higher fever. · You have a fever with a stiff neck or severe headache. · You have new or worse trouble swallowing. · Your sore throat gets much worse on one side. · Your pain becomes much worse on one side of your throat. Watch closely for changes in your health, and be sure to contact your doctor if: 
· You are not getting better after 2 days (48 hours). · You do not get better as expected. Where can you learn more? Go to http://cristina-mily.info/. Enter K625 in the search box to learn more about \"Strep Throat: Care Instructions. \" Current as of: July 29, 2016 Content Version: 11.3 © 4176-3887 [employer]. Care instructions adapted under license by B[employer]edEE (which disclaims liability or warranty for this information). If you have questions about a medical condition or this instruction, always ask your healthcare professional. [address] any warranty or liability for your use of this information. Introducing [facility]! Odilon Cook introduces Kadenze patient portal. Now you can access parts of your medical record, email your doctor's office, and request medication refills online. 1. In your internet browser, go to [email or web address] 2. Click on the First Time User? Click Here link in the Sign In box. You will see the New Member Sign Up page. 3. Enter your Kadenze Access Code exactly as it appears below. You will not need to use this code after youve completed the sign-up process. If you do not sign up before the expiration date, you must request a new code. · Kadenze Access Code: [email or web address] Expires: 12/17/2017 11:16 AM 
 
4. Enter the last four digits of your Social Security Number (xxxx) and Date of Birth (mm/dd/yyyy) as indicated and click Submit. You will be taken to the next sign-up page. 5. Create a Slots.comt ID. This will be your Kadenze login ID and cannot be changed, so think of one that is secure and easy to remember. 6. Create a Slots.comt password. You can change your password at any time. 7. Enter your Password Reset Question and Answer. This can be used at a later time if you forget your password. 8. Enter your e-mail address. You will receive e-mail notification when new information is available in [address] 9. Click Sign Up. You can now view and download portions of your medical record. 10. Click the Download Summary menu link to download a portable copy of your medical information. If you have questions, please visit the Frequently Asked Questions section of the Sonian website. Remember, Sonian is NOT to be used for urgent needs. For medical emergencies, dial 911. Now available from your iPhone and Android! Please provide this summary of care documentation to your next provider. Your primary care clinician is listed as Gus Glaser. If you have any questions after today's visit, please call [phone].

## 2017-09-29 ENCOUNTER — OFFICE VISIT (OUTPATIENT)
Dept: INTERNAL MEDICINE CLINIC | Age: 37
End: 2017-09-29

## 2017-09-29 VITALS
RESPIRATION RATE: 16 BRPM | DIASTOLIC BLOOD PRESSURE: 62 MMHG | WEIGHT: 117.2 LBS | SYSTOLIC BLOOD PRESSURE: 104 MMHG | BODY MASS INDEX: 20.77 KG/M2 | OXYGEN SATURATION: 97 % | HEIGHT: 63 IN | HEART RATE: 80 BPM | TEMPERATURE: 98.8 F

## 2017-09-29 DIAGNOSIS — N64.4 BREAST PAIN: ICD-10-CM

## 2017-09-29 DIAGNOSIS — J02.9 SORE THROAT: Primary | ICD-10-CM

## 2017-09-29 DIAGNOSIS — R09.82 PND (POST-NASAL DRIP): ICD-10-CM

## 2017-09-29 DIAGNOSIS — Z23 ENCOUNTER FOR IMMUNIZATION: ICD-10-CM

## 2017-09-29 LAB
S PYO AG THROAT QL: POSITIVE
VALID INTERNAL CONTROL?: YES

## 2017-09-29 NOTE — PROGRESS NOTES
Chief Complaint   Patient presents with    Hoarse     1. Have you been to the ER, urgent care clinic since your last visit? Hospitalized since your last visit? No    2. Have you seen or consulted any other health care providers outside of the [address] [place] [address] since your last visit? Include any pap smears or colon screening. No    Patient received flu vaccine in office today. Administered Fluarix 0.5 ml in left deltoid, IM.  Pt tolerated well

## 2017-09-29 NOTE — PATIENT INSTRUCTIONS
It was a pleasure to see you! As discussed:    Complete 14 days of Amoxicillin  Post nasal drip  -Use nasal saline spray 3-4 times/day   -Start non sedating antihistamine such as Claritin, Allegra or Zyrtec (generic is fine) in the day  -Add Benadryl 25mg every evening 2 hours before bedtime if night time coughing is a problem       Breast Pain: Care Instructions  Your Care Instructions  Breast tenderness and pain may come and go with your monthly periods (cyclic), or it may not follow any pattern (noncyclic). Breast pain is rarely caused by a serious health problem. You may need tests to find the cause. Follow-up care is a key part of your treatment and safety. Be sure to make and go to all appointments, and call your doctor if you are having problems. Its also a good idea to know your test results and keep a list of the medicines you take. How can you care for yourself at home? · If your doctor gave you medicine, take it exactly as prescribed. Call your doctor if you think you are having a problem with your medicine. · Take an over-the-counter pain medicine, such as acetaminophen (Tylenol), ibuprofen (Advil, Motrin), or naproxen (Aleve), to relieve pain and swelling. Read and follow all instructions on the label. · Do not take two or more pain medicines at the same time unless the doctor told you to. Many pain medicines have acetaminophen, which is Tylenol. Too much acetaminophen (Tylenol) can be harmful. · Wear a supportive bra, such as a sports bra or a jog bra. · Cut down on the amount of fat in your diet. If you need help planning healthy meals, see a dietitian. · Get at least 30 minutes of exercise on most days of the week. Walking is a good choice. You also may want to do other activities, such as running, swimming, cycling, or playing tennis or team sports. · Keep a healthy sleep pattern. Go to bed at the same time every night, and get up at the same time every day.   When should you call for help?  Call your doctor now or seek immediate medical care if:  · You have new changes in a breast, such as:  ¨ A lump or thickening in your breast or armpit. ¨ A change in the breast's size or shape. ¨ Skin changes, such as dimples or puckers. ¨ Nipple discharge. ¨ A change in the color or feel of the skin of your breast or the darker area around the nipple (areola). ¨ A change in the shape of the nipple (it may look like it's being pulled into the breast). · You have symptoms of a breast infection, such as:  ¨ Increased pain, swelling, redness, or warmth around a breast.  ¨ Red streaks extending from the breast.  ¨ Pus draining from a breast.  ¨ A fever. Watch closely for changes in your health, and be sure to contact your doctor if:  · Your breast pain does not get better after 1 week. · You have a lump or thickening in your breast or armpit. Where can you learn more? Go to http://cristina-mily.info/. Enter R448 in the search box to learn more about \"Breast Pain: Care Instructions. \"  Current as of: March 20, 2017  Content Version: 11.3  © 8933-2591 [employer]. Care instructions adapted under license by [employer] (which disclaims liability or warranty for this information). If you have questions about a medical condition or this instruction, always ask your healthcare professional. [place] [address] any warranty or liability for your use of this information.

## 2017-09-29 NOTE — PROGRESS NOTES
HISTORY OF PRESENT ILLNESS  Dennis Day is a 39 y.o. female. Sore Throat    The current episode started more than 1 week ago. The problem has been gradually improving. Associated symptoms include ear pain, plugged ear sensation, swollen glands and trouble swallowing (improved ). Pertinent negatives include no diarrhea and no cough. She has had exposure to strep. Treatments tried: Amoxicillin 9/18-present    Breast pain   The current episode started more than 1 week ago. The problem has been gradually improving. Associated symptoms comments: Lateral right sided breast pain and ttp   No skin changes,trauma, nipple d/c  FHx: No early breast cancer  On OCP recently resume menstration LMP 9/1/17 . The treatment provided mild relief. Review of Systems   HENT: Positive for ear pain, sore throat and trouble swallowing (improved ). Respiratory: Negative for cough. Gastrointestinal: Negative for diarrhea. Patient Active Problem List    Diagnosis Date Noted    Mixed hyperlipidemia 05/26/2017    Vitamin D insufficiency 05/26/2017    [facility]al [facility] 01/26/2017    Depression, major, in remission ([facility] 01/26/2017    Family history of renal cancer 01/26/2017       Current Outpatient Prescriptions   Medication Sig Dispense Refill    KAITLIB FE 0.8mg-25mcg(24) and 75 mg (4) chew TAKE 1 TABLET BY MOUTH EVERY DAY  12    omega-3 acid ethyl esters (LOVAZA) 1 gram capsule Take 2 Caps by mouth two (2) times a day. 60 Cap 4    mometasone (ELOCON) 0.1 % topical cream Apply  to affected area two (2) times a day. 30 g 1    NORETH-ETHINYL ESTRADIOL-IRON PO Take  by mouth.          Allergies   Allergen Reactions    Sulfa (Sulfonamide Antibiotics) Rash      Visit Vitals    /62 (BP 1 Location: Right arm, BP Patient Position: Sitting)    Pulse 80    Temp 98.8 °F (37.1 °C) (Oral)    Resp 16    Ht 5' 3.35\" (1.609 m)    Wt 117 lb 3.2 oz (53.2 kg)    SpO2 97%    BMI 20.53 kg/m2 Physical Exam   Constitutional: She is oriented to person, place, and time. No distress. HENT:   Cobblestoning and abundant posterior mucus drainage      Cardiovascular: Normal rate, regular rhythm and normal heart sounds. Pulmonary/Chest: No respiratory distress. She has no wheezes. She has no rales. Right breast exhibits tenderness. Right breast exhibits no inverted nipple, no mass and no nipple discharge. Left breast exhibits no inverted nipple, no mass, no nipple discharge and no tenderness. Breasts are symmetrical.       Neurological: She is alert and oriented to person, place, and time. Psychiatric: She has a normal mood and affect. Recent Results (from the past 12 hour(s))   AMB POC RAPID STREP A    Collection Time: 09/29/17 11:15 AM   Result Value Ref Range    VALID INTERNAL CONTROL POC Yes     Group A Strep Ag Positive Negative       ASSESSMENT and PLAN  Diagnoses and all orders for this visit:    1. Sore throat- resolving strep. S/s more c/w PND. Treatment as discussed. Complete 14 days of Amoxicillin  Post nasal drip  -Use nasal saline spray 3-4 times/day   -Start non sedating antihistamine such as Claritin, Allegra or Zyrtec (generic is fine) in the day  -Add Benadryl 25mg every evening 2 hours before bedtime if night time coughing is a problem    -     AMB POC RAPID STREP A    2. Breast pain- likely fibrocystic hormonal changes based on s/s. Doesn't have high risk factors for Breast Cancer. Monitor s/s x 3 months---> imaging and evaluation via gynecology if no improvement. Red flags to warrant ER or earlier clinical evaluation reviewed. See AVS for full details of plan and patient discussion.      3. PND (post-nasal drip)- see above     Orders Placed This Encounter    INFLUENZA VIRUS VACCINE QUADRIVALENT, PRESERVATIVE FREE SYRINGE (11535)    AMB POC RAPID STREP A    MD IMMUNIZ ADMIN,1 SINGLE/COMB VAC/TOXOID       Follow-up Disposition:  Return if symptoms worsen or fail to improve within 2 weeks. Medication risks/benefits/costs/interactions/alternatives discussed with patient. Payal Gordon  was given an after visit summary which includes diagnoses, current medications, & vitals. she expressed understanding with the diagnosis and plan.

## 2017-09-29 NOTE — MR AVS SNAPSHOT
Visit Information Date & Time Provider Department Dept. Phone Encounter #  
 9/29/2017 10:45 AM Norberto Min MD [facility] Internal Medicine [phone] [id number] Follow-up Instructions Return if symptoms worsen or fail to improve within 2 weeks. Your Appointments 11/30/2017  9:30 AM  
ROUTINE CARE with Norberto Min MD  
[facility] Internal Medicine [facility] Appt Note: 6mo f/u  
 [address] [facility] [address] [facility] [address]  
[address] [address] [place] [address] Upcoming Health Maintenance Date Due INFLUENZA AGE 9 TO ADULT 8/1/2017 PAP AKA CERVICAL CYTOLOGY 6/14/2018 DTaP/Tdap/Td series (2 - Td) 1/1/2024 Allergies as of 9/29/2017  Review Complete On: 9/29/2017 By: Norberto Min MD  
  
 Severity Noted Reaction Type Reactions Sulfa (Sulfonamide Antibiotics)  01/26/2017    Rash Current Immunizations  Reviewed on 5/9/2017 No immunizations on file. Not reviewed this visit You Were Diagnosed With   
  
 Codes Comments Sore throat    -  Primary ICD-10-CM: J02.9 ICD-9-CM: 562 Vitals BP Pulse Temp Resp Height(growth percentile) Weight(growth percentile) 104/62 (BP 1 Location: Right arm, BP Patient Position: Sitting) 80 98.8 °F (37.1 °C) (Oral) 16 5' 3.35\" (1.609 m) 117 lb 3.2 oz (53.2 kg) SpO2 BMI OB Status Smoking Status 97% 20.53 kg/m2 Medically Induced Never Smoker Vitals History BMI and BSA Data Body Mass Index Body Surface Area 20.53 kg/m 2 1.54 m 2 Preferred Pharmacy Pharmacy Name Phone [id number] [phone] Your Updated Medication List  
  
   
This list is accurate as of: 9/29/17 11:48 AM.  Always use your most recent med list.  
  
  
  
  
 mometasone 0.1 % topical cream  
Commonly known as:  Dane Cease  
 Apply  to affected area two (2) times a day. * NORETH-ETHINYL ESTRADIOL-IRON PO Take  by mouth. * KAITLIB FE 0.8mg-25mcg(24) and 75 mg (4) Chew Generic drug:  noreth-ethinyl estradiol-iron TAKE 1 TABLET BY MOUTH EVERY DAY  
  
 omega-3 acid ethyl esters 1 gram capsule Commonly known as:  Epimenio Saleem Take 2 Caps by mouth two (2) times a day. * Notice: This list has 2 medication(s) that are the same as other medications prescribed for you. Read the directions carefully, and ask your doctor or other care provider to review them with you. We Performed the Following AMB POC RAPID STREP A [25276 CPT(R)] Follow-up Instructions Return if symptoms worsen or fail to improve within 2 weeks. Patient Instructions It was a pleasure to see you! As discussed: 
 
Complete 14 days of Amoxicillin Post nasal drip 
-Use nasal saline spray 3-4 times/day  
-Start non sedating antihistamine such as Claritin, Allegra or Zyrtec (generic is fine) in the day 
-Add Benadryl 25mg every evening 2 hours before bedtime if night time coughing is a problem Breast Pain: Care Instructions Your Care Instructions Breast tenderness and pain may come and go with your monthly periods (cyclic), or it may not follow any pattern (noncyclic). Breast pain is rarely caused by a serious health problem. You may need tests to find the cause. Follow-up care is a key part of your treatment and safety. Be sure to make and go to all appointments, and call your doctor if you are having problems. Its also a good idea to know your test results and keep a list of the medicines you take. How can you care for yourself at home? · If your doctor gave you medicine, take it exactly as prescribed. Call your doctor if you think you are having a problem with your medicine.  
· Take an over-the-counter pain medicine, such as acetaminophen (Tylenol), ibuprofen (Advil, Motrin), or naproxen (Aleve), to relieve pain and swelling. Read and follow all instructions on the label. · Do not take two or more pain medicines at the same time unless the doctor told you to. Many pain medicines have acetaminophen, which is Tylenol. Too much acetaminophen (Tylenol) can be harmful. · Wear a supportive bra, such as a sports bra or a jog bra. · Cut down on the amount of fat in your diet. If you need help planning healthy meals, see a dietitian. · Get at least 30 minutes of exercise on most days of the week. Walking is a good choice. You also may want to do other activities, such as running, swimming, cycling, or playing tennis or team sports. · Keep a healthy sleep pattern. Go to bed at the same time every night, and get up at the same time every day. When should you call for help? Call your doctor now or seek immediate medical care if: 
· You have new changes in a breast, such as: ¨ A lump or thickening in your breast or armpit. ¨ A change in the breast's size or shape. ¨ Skin changes, such as dimples or puckers. ¨ Nipple discharge. ¨ A change in the color or feel of the skin of your breast or the darker area around the nipple (areola). ¨ A change in the shape of the nipple (it may look like it's being pulled into the breast). · You have symptoms of a breast infection, such as: 
¨ Increased pain, swelling, redness, or warmth around a breast. 
¨ Red streaks extending from the breast. 
¨ Pus draining from a breast. 
¨ A fever. Watch closely for changes in your health, and be sure to contact your doctor if: 
· Your breast pain does not get better after 1 week. · You have a lump or thickening in your breast or armpit. Where can you learn more? Go to http://cristina-mily.info/. Enter H723 in the search box to learn more about \"Breast Pain: Care Instructions. \" Current as of: March 20, 2017 Content Version: 11.3 © 2334-7073 [employer]. Care instructions adapted under license by [employer] (which disclaims liability or warranty for this information). If you have questions about a medical condition or this instruction, always ask your healthcare professional. [address] any warranty or liability for your use of this information. Introducing [facility]! Dear Odalys Espinoza: Thank you for requesting a InCorta account. Our records indicate that you already have an active InCorta account. You can access your account anytime at [email or web address] Did you know that you can access your hospital and ER discharge instructions at any time in InCorta? You can also review all of your test results from your hospital stay or ER visit. Additional Information If you have questions, please visit the Frequently Asked Questions section of the InCorta website at [email or web address] Remember, InCorta is NOT to be used for urgent needs. For medical emergencies, dial 911. Now available from your iPhone and Android! Please provide this summary of care documentation to your next provider. Your primary care clinician is listed as Gus Glaser. If you have any questions after today's visit, please call [phone].

## 2017-10-05 DIAGNOSIS — R13.10 ODYNOPHAGIA: Primary | ICD-10-CM

## 2017-10-11 ENCOUNTER — HOSPITAL ENCOUNTER (OUTPATIENT)
Dept: GENERAL RADIOLOGY | Age: 37
Discharge: HOME | End: 2017-10-11
Payer: COMMERCIAL

## 2017-10-11 ENCOUNTER — OFFICE VISIT (OUTPATIENT)
Dept: INTERNAL MEDICINE CLINIC | Age: 37
End: 2017-10-11

## 2017-10-11 VITALS
DIASTOLIC BLOOD PRESSURE: 60 MMHG | RESPIRATION RATE: 18 BRPM | HEART RATE: 116 BPM | WEIGHT: 113.4 LBS | TEMPERATURE: 99 F | OXYGEN SATURATION: 98 % | BODY MASS INDEX: 20.09 KG/M2 | SYSTOLIC BLOOD PRESSURE: 102 MMHG | HEIGHT: 63 IN

## 2017-10-11 DIAGNOSIS — J02.9 SORE THROAT: ICD-10-CM

## 2017-10-11 DIAGNOSIS — R53.83 OTHER FATIGUE: ICD-10-CM

## 2017-10-11 DIAGNOSIS — M35.9 POST-STREPTOCOCCAL DISORDER (HCC): ICD-10-CM

## 2017-10-11 DIAGNOSIS — K29.60 NSAID INDUCED GASTRITIS: ICD-10-CM

## 2017-10-11 DIAGNOSIS — T39.395A NSAID INDUCED GASTRITIS: ICD-10-CM

## 2017-10-11 DIAGNOSIS — R53.83 OTHER FATIGUE: Primary | ICD-10-CM

## 2017-10-11 LAB
BILIRUB UR QL STRIP: NEGATIVE
GLUCOSE UR-MCNC: NEGATIVE MG/DL
KETONES P FAST UR STRIP-MCNC: NEGATIVE MG/DL
PH UR STRIP: 6 [PH] (ref 4.6–8)
PROT UR QL STRIP: NORMAL MG/DL
S PYO AG THROAT QL: NEGATIVE
SP GR UR STRIP: 1.03 (ref 1–1.03)
UA UROBILINOGEN AMB POC: NORMAL (ref 0.2–1)
URINALYSIS CLARITY POC: CLEAR
URINALYSIS COLOR POC: NORMAL
URINE BLOOD POC: NORMAL
URINE LEUKOCYTES POC: NEGATIVE
URINE NITRITES POC: NEGATIVE
VALID INTERNAL CONTROL?: YES

## 2017-10-11 PROCEDURE — 71020 XR CHEST PA LAT: CPT

## 2017-10-11 NOTE — PROGRESS NOTES
HISTORY OF PRESENT ILLNESS  Arthur Levy is a 39 y.o. female. Diarrhea    The current episode started yesterday. The problem has been gradually worsening. Diarrhea characteristics: Uneeda Stool Scale #4  Associated symptoms include cough. Pertinent negatives include no chills, no sweats, no arthralgias and no myalgias. Associated symptoms comments: Right breast pain   Right hip leg pain . Treatments tried: Ibuprofen 200mg-600mg/  day  The treatment provided no relief. Sore Throat  Arthur Levy presents for reevaluation of ongoing malaise, fatigue. Associated sx include     Fatigue  Patient complains of fatigue. Symptoms began 1 month ago. Sentinal symptom the patient feels fatigue began with: strep infection. Symptoms of her fatigue have been general malaise. Patient describes the following psychologic symptoms: none. Patient denies significant change in weight, symptoms of true arthritis. The course has been waxing and waning. Severity has been struggles to carry out day to day responsibilities. . Previous visits for this problem: yes, last seen 1 week ago by me\    Review of Systems   Constitutional: Negative for chills. Respiratory: Positive for cough. Gastrointestinal: Positive for diarrhea. Musculoskeletal: Negative for arthralgias and myalgias. Patient Active Problem List    Diagnosis Date Noted    Mixed hyperlipidemia 05/26/2017    Vitamin D insufficiency 05/26/2017    Seasonal affective disorder ([facility] [address] 01/26/2017    Depression, major, in remission ([facility] [address] [facility] 01/26/2017    Family history of renal cancer 01/26/2017       Current Outpatient Prescriptions   Medication Sig Dispense Refill    klack's mixture Solution Take  by mouth every six (6) hours.  Diphenhydramine elixir 12.5mg/ml 500ml, Nystatin suspension 120ml,Tetracycline 500mg capsules  12 capsules (6g), Hydrocortisone 20mg tablet 12 tablets (240mg), Water for irrigation QS ad 1000ml      KAITLIB FE 0.8mg-25mcg(24) and 75 mg (4) chew TAKE 1 TABLET BY MOUTH EVERY DAY  12    NORETH-ETHINYL ESTRADIOL-IRON PO Take  by mouth.  omega-3 acid ethyl esters (LOVAZA) 1 gram capsule Take 2 Caps by mouth two (2) times a day. 60 Cap 4    mometasone (ELOCON) 0.1 % topical cream Apply  to affected area two (2) times a day. 30 g 1       Allergies   Allergen Reactions    Sulfa (Sulfonamide Antibiotics) Rash      Visit Vitals    /60 (BP 1 Location: Right arm, BP Patient Position: Sitting)    Pulse (!) 116    Temp 99 °F (37.2 °C) (Oral)    Resp 18    Ht 5' 3.35\" (1.609 m)    Wt 113 lb 6.4 oz (51.4 kg)    SpO2 98%    BMI 19.87 kg/m2       Physical Exam   Constitutional: She is oriented to person, place, and time. She appears well-developed. No distress. HENT:   Nose: Nose normal. Right sinus exhibits no maxillary sinus tenderness and no frontal sinus tenderness. Left sinus exhibits no maxillary sinus tenderness and no frontal sinus tenderness. Mouth/Throat: Oropharynx is clear and moist. No oropharyngeal exudate. Cobblestoning and abundant posterior mucus drainage      Eyes: Conjunctivae are normal.   Neck: Neck supple. Cardiovascular: Normal rate and regular rhythm. No murmur (appreciated ) heard. Pulmonary/Chest: Effort normal and breath sounds normal. No respiratory distress. She has no wheezes. She has no rales. She exhibits no tenderness. Abdominal: Bowel sounds are normal. She exhibits no distension. There is tenderness in the epigastric area. There is no rebound and no guarding. Lymphadenopathy:     She has cervical adenopathy (shoddy tender ). Neurological: She is alert and oriented to person, place, and time. Skin: Skin is warm. Psychiatric: She has a normal mood and affect. EKG: sinus tachycardia, no ventricular enlargement.     Recent Results (from the past 12 hour(s))   AMB POC RAPID STREP A    Collection Time: 10/11/17  2:15 PM   Result Value Ref Range    VALID INTERNAL CONTROL POC Yes     Group A Strep Ag Negative Negative   AMB POC URINALYSIS DIP STICK AUTO W/O MICRO    Collection Time: 10/11/17  3:30 PM   Result Value Ref Range    Color (UA POC) Dark Shannan     Clarity (UA POC) Clear     Glucose (UA POC) Negative Negative    Bilirubin (UA POC) Negative Negative    Ketones (UA POC) Negative Negative    Specific gravity (UA POC) 1.030 1.001 - 1.035    Blood (UA POC) Trace Negative    pH (UA POC) 6.0 4.6 - 8.0    Protein (UA POC) Trace Negative mg/dL    Urobilinogen (UA POC) 0.2 mg/dL 0.2 - 1    Nitrites (UA POC) Negative Negative    Leukocyte esterase (UA POC) Negative Negative       ASSESSMENT and PLAN  Diagnoses and all orders for this visit:    1. Other fatigue- Brittny Ravi reports persistent fatigue since treatment for strep. On exam today she has sinus tachycardia and urine analysis shows high specific gravity indicating mild dehydration. Her EKG does not show any signs of ventricular enlargement though it was noted that there may be atrial enlargement. My greatest my concern is for the sequela of inadequately treated strep infection. However she did receive an adequate course of amoxicillin and does not demonstrate any clinical symptoms of sequela such as arthritis or carditis. Will check labs to ensure her strep titer is low and there is no evidence of this sequela. Red flags to warrant ER or earlier clinical evaluation reviewed. See AVS for full details of plan and patient discussion. -     XR CHEST PA LAT; Future  -     AMB POC EKG ROUTINE W/ 12 LEADS, INTER & REP    2. Sore throat  -     AMB POC RAPID STREP A  -     CULTURE, STREP THROAT    3. Post-Streptococcal disorder (HCC)  -     STREPTOLYSIN O (ASO) AB  -     METABOLIC PANEL, COMPREHENSIVE  -     CBC WITH AUTOMATED DIFF  -     AMB POC URINALYSIS DIP STICK AUTO W/O MICRO  -     CK  -     MONONUCLEOSIS SCREEN  -     XR CHEST PA LAT; Future  -     AMB POC EKG ROUTINE W/ 12 LEADS, INTER & REP    4.  NSAID induced gastritis-no melena or hematochezia. However she has been taking more ibuprofen and has signs and symptoms of gastritis. Will stop NSAIDs for now. She declined symptomatic relief with any sort of H2 blocker or antacid so we will hold this for now. Follow-up Disposition:  Return in about 2 weeks (around 10/25/2017) for Follow-up Fatigue . Medication risks/benefits/costs/interactions/alternatives discussed with patient. Vicki Mcdaniel  was given an after visit summary which includes diagnoses, current medications, & vitals. she expressed understanding with the diagnosis and plan.     40 minutes of 45 minutes of care coordination was spent counseling patient on treatment plan and assessing understanding

## 2017-10-11 NOTE — MR AVS SNAPSHOT
Visit Information Date & Time Provider Department Dept. Phone Encounter #  
 10/11/2017  2:00 PM Edy Phillips MD [facility] [phone] [id number] Follow-up Instructions Return in about 2 weeks (around 10/25/2017) for Follow-up Fatigue . Your Appointments 11/30/2017  9:30 AM  
ROUTINE CARE with Edy Phillips MD  
[facility] Appt Note: 6mo f/u  
 [address] [facility] [address]  
[facility] [address] [id number] [place] [address] Upcoming Health Maintenance Date Due  
 PAP AKA CERVICAL CYTOLOGY 6/14/2018 DTaP/Tdap/Td series (2 - Td) 1/1/2024 Allergies as of 10/11/2017  Review Complete On: 10/11/2017 By: Edy Phillips MD  
  
 Severity Noted Reaction Type Reactions Sulfa (Sulfonamide Antibiotics)  01/26/2017    Rash Current Immunizations  Reviewed on 5/9/2017 Name Date Influenza Vaccine (Quad) PF 9/29/2017 Not reviewed this visit You Were Diagnosed With   
  
 Codes Comments Other fatigue    -  Primary ICD-10-CM: R53.83 ICD-9-CM: 780.79 Sore throat     ICD-10-CM: J02.9 ICD-9-CM: 528 Post-Streptococcal disorder ([facility] [facility]     ICD-10-CM: M35.9 ICD-9-CM: 279.49   
 NSAID induced gastritis     ICD-10-CM: K29.60, T39.395A ICD-9-CM: 535.40, E935.8 Vitals BP Pulse Temp Resp Height(growth percentile) Weight(growth percentile) 102/60 (BP 1 Location: Right arm, BP Patient Position: Sitting) (!) 116 99 °F (37.2 °C) (Oral) 18 5' 3.35\" (1.609 m) 113 lb 6.4 oz (51.4 kg) SpO2 BMI OB Status Smoking Status 98% 19.87 kg/m2 Medically Induced Never Smoker Vitals History BMI and BSA Data Body Mass Index Body Surface Area  
 19.87 kg/m 2 1.52 m 2 Preferred Pharmacy Pharmacy Name Phone [id number] [phone] Your Updated Medication List  
  
   
This list is accurate as of: 10/11/17  3:17 PM.  Always use your most recent med list.  
  
  
  
  
 klack's mixture Solution Take  by mouth every six (6) hours. Diphenhydramine elixir 12.5mg/ml 500ml, Nystatin suspension 120ml,Tetracycline 500mg capsules  12 capsules (6g), Hydrocortisone 20mg tablet 12 tablets (240mg), Water for irrigation QS ad 1000ml  
  
 mometasone 0.1 % topical cream  
Commonly known as:  Modesta Boas Apply  to affected area two (2) times a day. * NORETH-ETHINYL ESTRADIOL-IRON PO Take  by mouth. * KAITLIB FE 0.8mg-25mcg(24) and 75 mg (4) Chew Generic drug:  noreth-ethinyl estradiol-iron TAKE 1 TABLET BY MOUTH EVERY DAY  
  
 omega-3 acid ethyl esters 1 gram capsule Commonly known as:  Heather Hoit Take 2 Caps by mouth two (2) times a day. * Notice: This list has 2 medication(s) that are the same as other medications prescribed for you. Read the directions carefully, and ask your doctor or other care provider to review them with you. We Performed the Following AMB POC EKG ROUTINE W/ 12 LEADS, INTER & REP [51711 CPT(R)] AMB POC RAPID STREP A [36211 CPT(R)] AMB POC URINALYSIS DIP STICK AUTO W/O MICRO [13875 CPT(R)] CBC WITH AUTOMATED DIFF [45102 CPT(R)] CK [id number] CPT(R)] CULTURE, STREP THROAT [id number] CPT(R)] METABOLIC PANEL, COMPREHENSIVE [20766 CPT(R)] MONONUCLEOSIS SCREEN [id number] CPT(R)] STREPTOLYSIN O (ASO) AB [id number] CPT(R)] Follow-up Instructions Return in about 2 weeks (around 10/25/2017) for Follow-up Fatigue . To-Do List   
 10/11/2017 Imaging:  XR CHEST PA LAT Patient Instructions It was a pleasure to see you! As discussed: 
 
I have ordered labs and xray to determine the cause of your symptoms. Stop ibuprofen and NSAIDs. Tylenol is okay. You are mildly dehydrated. Stay well hydrated. Drink sports drinks- Gatorade, Powerade, or similar drink at least  32oz/ day. For every glass of water you drink have 2-3 crackers or a meal. Salt is essential to keeping fluid in your body. The sign that you drank enough is for your urine to be very light in color and not feeling dizziness Gastritis: Care Instructions Your Care Instructions Gastritis is a sore and upset stomach. It happens when something irritates the stomach lining. Many things can cause it. These include an infection such as the flu or something you ate or drank. Medicines or a sore on the lining of the stomach (ulcer) also can cause it. Your belly may bloat and ache. You may belch, vomit, and feel sick to your stomach. You should be able to relieve the problem by taking medicine. And it may help to change your diet. If gastritis lasts, your doctor may prescribe medicine. Follow-up care is a key part of your treatment and safety. Be sure to make and go to all appointments, and call your doctor if you are having problems. It's also a good idea to know your test results and keep a list of the medicines you take. How can you care for yourself at home? · If your doctor prescribed antibiotics, take them as directed. Do not stop taking them just because you feel better. You need to take the full course of antibiotics. · Be safe with medicines. If your doctor prescribed medicine to decrease stomach acid, take it as directed. Call your doctor if you think you are having a problem with your medicine. · Do not take any other medicine, including over-the-counter pain relievers, without talking to your doctor first. 
· If your doctor recommends over-the-counter medicine to reduce stomach acid, such as Pepcid AC, Prilosec, Tagamet HB, or Zantac 75, follow the directions on the label. · Drink plenty of fluids (enough so that your urine is light yellow or clear like water) to prevent dehydration.  Choose water and other caffeine-free clear liquids. If you have kidney, heart, or liver disease and have to limit fluids, talk with your doctor before you increase the amount of fluids you drink. · Limit how much alcohol you drink. · Avoid coffee, tea, cola drinks, chocolate, and other foods with caffeine. They increase stomach acid. When should you call for help? Call 911 anytime you think you may need emergency care. For example, call if: 
· You vomit blood or what looks like coffee grounds. · You pass maroon or very bloody stools. Call your doctor now or seek immediate medical care if: 
· You start breathing fast and have not produced urine in the last 8 hours. · You cannot keep fluids down. Watch closely for changes in your health, and be sure to contact your doctor if: 
· You do not get better as expected. Where can you learn more? Go to http://cristina-mily.info/. Enter [id number] in the search box to learn more about \"Gastritis: Care Instructions. \" Current as of: August 9, 2016 Content Version: 11.3 © 6052-3375 [employer]. Care instructions adapted under license by [employer] (which disclaims liability or warranty for this information). If you have questions about a medical condition or this instruction, always ask your healthcare professional. [address] any warranty or liability for your use of this information. Fatigue: Care Instructions Your Care Instructions Fatigue is a feeling of tiredness, exhaustion, or lack of energy. You may feel fatigue because of too much or not enough activity. It can also come from stress, lack of sleep, boredom, and poor diet. Many medical problems, such as viral infections, can cause fatigue. Emotional problems, especially depression, are often the cause of fatigue. Fatigue is most often a symptom of another problem. Treatment for fatigue depends on the cause.  For example, if you have fatigue because you have a certain health problem, treating this problem also treats your fatigue. If depression or anxiety is the cause, treatment may help. Follow-up care is a key part of your treatment and safety. Be sure to make and go to all appointments, and call your doctor if you are having problems. It's also a good idea to know your test results and keep a list of the medicines you take. How can you care for yourself at home? · Get regular exercise. But don't overdo it. Go back and forth between rest and exercise. · Get plenty of rest. 
· Eat a healthy diet. Do not skip meals, especially breakfast. 
· Reduce your use of caffeine, tobacco, and alcohol. Caffeine is most often found in coffee, tea, cola drinks, and chocolate. · Limit medicines that can cause fatigue. This includes tranquilizers and cold and allergy medicines. When should you call for help? Watch closely for changes in your health, and be sure to contact your doctor if: 
· You have new symptoms such as fever or a rash. · Your fatigue gets worse. · You have been feeling down, depressed, or hopeless. Or you may have lost interest in things that you usually enjoy. · You are not getting better as expected. Where can you learn more? Go to http://cristina-mily.info/. Enter I672 in the search box to learn more about \"Fatigue: Care Instructions. \" Current as of: March 20, 2017 Content Version: 11.3 © 5760-0132 Citrus. Care instructions adapted under license by [employer] (which disclaims liability or warranty for this information). If you have questions about a medical condition or this instruction, always ask your healthcare professional. [address] any warranty or liability for your use of this information. Introducing [facility]! Dear Thelma Rivers: Thank you for requesting a HotelQuickly account.   Our records indicate that you already have an active SelStor account. You can access your account anytime at [email or web address] Did you know that you can access your hospital and ER discharge instructions at any time in SelStor? You can also review all of your test results from your hospital stay or ER visit. Additional Information If you have questions, please visit the Frequently Asked Questions section of the SelStor website at [email or web address] Remember, SelStor is NOT to be used for urgent needs. For medical emergencies, dial 911. Now available from your iPhone and Android! Please provide this summary of care documentation to your next provider. Your primary care clinician is listed as Camelia Zamudio. If you have any questions after today's visit, please call [phone].

## 2017-10-11 NOTE — PROGRESS NOTES
1. Have you been to the ER, urgent care clinic since your last visit? Hospitalized since your last visit? No    2. Have you seen or consulted any other health care providers outside of the [address] [place] [address] since your last visit? Include any pap smears or colon screening.  Yes When: 10/5/2017 Where: ENT Reason for visit: Ear and throat pain

## 2017-10-11 NOTE — PATIENT INSTRUCTIONS
It was a pleasure to see you! As discussed:    I have ordered labs and xray to determine the cause of your symptoms. Stop ibuprofen and NSAIDs. Tylenol is okay. You are mildly dehydrated. Stay well hydrated. Drink sports drinks- Gatorade, Powerade, or similar drink at least  32oz/ day. For every glass of water you drink have 2-3 crackers or a meal. Salt is essential to keeping fluid in your body. The sign that you drank enough is for your urine to be very light in color and not feeling dizziness    Gastritis: Care Instructions  Your Care Instructions    Gastritis is a sore and upset stomach. It happens when something irritates the stomach lining. Many things can cause it. These include an infection such as the flu or something you ate or drank. Medicines or a sore on the lining of the stomach (ulcer) also can cause it. Your belly may bloat and ache. You may belch, vomit, and feel sick to your stomach. You should be able to relieve the problem by taking medicine. And it may help to change your diet. If gastritis lasts, your doctor may prescribe medicine. Follow-up care is a key part of your treatment and safety. Be sure to make and go to all appointments, and call your doctor if you are having problems. It's also a good idea to know your test results and keep a list of the medicines you take. How can you care for yourself at home? · If your doctor prescribed antibiotics, take them as directed. Do not stop taking them just because you feel better. You need to take the full course of antibiotics. · Be safe with medicines. If your doctor prescribed medicine to decrease stomach acid, take it as directed. Call your doctor if you think you are having a problem with your medicine.   · Do not take any other medicine, including over-the-counter pain relievers, without talking to your doctor first.  · If your doctor recommends over-the-counter medicine to reduce stomach acid, such as Pepcid AC, Prilosec, Tagamet HB, or Zantac 75, follow the directions on the label. · Drink plenty of fluids (enough so that your urine is light yellow or clear like water) to prevent dehydration. Choose water and other caffeine-free clear liquids. If you have kidney, heart, or liver disease and have to limit fluids, talk with your doctor before you increase the amount of fluids you drink. · Limit how much alcohol you drink. · Avoid coffee, tea, cola drinks, chocolate, and other foods with caffeine. They increase stomach acid. When should you call for help? Call 911 anytime you think you may need emergency care. For example, call if:  · You vomit blood or what looks like coffee grounds. · You pass maroon or very bloody stools. Call your doctor now or seek immediate medical care if:  · You start breathing fast and have not produced urine in the last 8 hours. · You cannot keep fluids down. Watch closely for changes in your health, and be sure to contact your doctor if:  · You do not get better as expected. Where can you learn more? Go to http://cristina-mily.info/. Enter [id number] in the search box to learn more about \"Gastritis: Care Instructions. \"  Current as of: August 9, 2016  Content Version: 11.3  © 6598-9751 [employer]. Care instructions adapted under license by [employer] (which disclaims liability or warranty for this information). If you have questions about a medical condition or this instruction, always ask your healthcare professional. [place] [address] any warranty or liability for your use of this information. Fatigue: Care Instructions  Your Care Instructions  Fatigue is a feeling of tiredness, exhaustion, or lack of energy. You may feel fatigue because of too much or not enough activity. It can also come from stress, lack of sleep, boredom, and poor diet. Many medical problems, such as viral infections, can cause fatigue.  Emotional problems, especially depression, are often the cause of fatigue. Fatigue is most often a symptom of another problem. Treatment for fatigue depends on the cause. For example, if you have fatigue because you have a certain health problem, treating this problem also treats your fatigue. If depression or anxiety is the cause, treatment may help. Follow-up care is a key part of your treatment and safety. Be sure to make and go to all appointments, and call your doctor if you are having problems. It's also a good idea to know your test results and keep a list of the medicines you take. How can you care for yourself at home? · Get regular exercise. But don't overdo it. Go back and forth between rest and exercise. · Get plenty of rest.  · Eat a healthy diet. Do not skip meals, especially breakfast.  · Reduce your use of caffeine, tobacco, and alcohol. Caffeine is most often found in coffee, tea, cola drinks, and chocolate. · Limit medicines that can cause fatigue. This includes tranquilizers and cold and allergy medicines. When should you call for help? Watch closely for changes in your health, and be sure to contact your doctor if:  · You have new symptoms such as fever or a rash. · Your fatigue gets worse. · You have been feeling down, depressed, or hopeless. Or you may have lost interest in things that you usually enjoy. · You are not getting better as expected. Where can you learn more? Go to http://cristina-mily.info/. Enter C791 in the search box to learn more about \"Fatigue: Care Instructions. \"  Current as of: March 20, 2017  Content Version: 11.3  © 8517-8107 [employer]ada. Care instructions adapted under license by [employer] (which disclaims liability or warranty for this information).  If you have questions about a medical condition or this instruction, always ask your healthcare professional. [address] any warranty or liability for your use of this information.

## 2017-10-12 DIAGNOSIS — R79.89 ELEVATED LFTS: Primary | ICD-10-CM

## 2017-10-12 LAB
ALBUMIN SERPL-MCNC: 3.8 G/DL (ref 3.5–5.5)
ALBUMIN/GLOB SERPL: 1.1 {RATIO} (ref 1.2–2.2)
ALP SERPL-CCNC: 247 IU/L (ref 39–117)
ALT SERPL-CCNC: 55 IU/L (ref 0–32)
ASO AB SERPL-ACNC: 261 IU/ML (ref 0–200)
AST SERPL-CCNC: 46 IU/L (ref 0–40)
BASOPHILS # BLD AUTO: 0 X10E3/UL (ref 0–0.2)
BASOPHILS NFR BLD AUTO: 0 %
BILIRUB SERPL-MCNC: 0.6 MG/DL (ref 0–1.2)
BUN SERPL-MCNC: 12 MG/DL (ref 6–20)
BUN/CREAT SERPL: 19 (ref 9–23)
CALCIUM SERPL-MCNC: 9.7 MG/DL (ref 8.7–10.2)
CHLORIDE SERPL-SCNC: 96 MMOL/L (ref 96–106)
CK SERPL-CCNC: 51 U/L (ref 24–173)
CO2 SERPL-SCNC: 26 MMOL/L (ref 18–29)
CREAT SERPL-MCNC: 0.62 MG/DL (ref 0.57–1)
EOSINOPHIL # BLD AUTO: 0 X10E3/UL (ref 0–0.4)
EOSINOPHIL NFR BLD AUTO: 0 %
ERYTHROCYTE [DISTWIDTH] IN BLOOD BY AUTOMATED COUNT: 12 % (ref 12.3–15.4)
GLOBULIN SER CALC-MCNC: 3.6 G/DL (ref 1.5–4.5)
GLUCOSE SERPL-MCNC: 92 MG/DL (ref 65–99)
HCT VFR BLD AUTO: 39.1 % (ref 34–46.6)
HETEROPH AB SER QL LA: NEGATIVE
HGB BLD-MCNC: 13.3 G/DL (ref 11.1–15.9)
IMM GRANULOCYTES # BLD: 0 X10E3/UL (ref 0–0.1)
IMM GRANULOCYTES NFR BLD: 0 %
LYMPHOCYTES # BLD AUTO: 1.8 X10E3/UL (ref 0.7–3.1)
LYMPHOCYTES NFR BLD AUTO: 20 %
MCH RBC QN AUTO: 30.9 PG (ref 26.6–33)
MCHC RBC AUTO-ENTMCNC: 34 G/DL (ref 31.5–35.7)
MCV RBC AUTO: 91 FL (ref 79–97)
MONOCYTES # BLD AUTO: 0.8 X10E3/UL (ref 0.1–0.9)
MONOCYTES NFR BLD AUTO: 9 %
NEUTROPHILS # BLD AUTO: 6.5 X10E3/UL (ref 1.4–7)
NEUTROPHILS NFR BLD AUTO: 71 %
PLATELET # BLD AUTO: 485 X10E3/UL (ref 150–379)
POTASSIUM SERPL-SCNC: 5.1 MMOL/L (ref 3.5–5.2)
PROT SERPL-MCNC: 7.4 G/DL (ref 6–8.5)
RBC # BLD AUTO: 4.31 X10E6/UL (ref 3.77–5.28)
SODIUM SERPL-SCNC: 138 MMOL/L (ref 134–144)
WBC # BLD AUTO: 9.1 X10E3/UL (ref 3.4–10.8)

## 2017-10-12 NOTE — PROGRESS NOTES
Please let Tran Lee know her liver tests are slightly elevated. Please order and schedule abdominal ultrasound for elevated LFTS. Tell her her strep test is weakly positive but no abx indicated.    Remainder of her labs are normal.

## 2017-10-12 NOTE — PROGRESS NOTES
Patient has been informed per drs result notes and recommendations, pt verbalizes understanding.  Pt scheduled for tomorrow at 10;30 am no solids for 8 hrs prior to test

## 2017-10-12 NOTE — PROGRESS NOTES
Hi  Ms. Tyshawn Frederick  It was a pleasure to see you today. Your xray does not show any abnormalities. Continue the treatment for discussed in clinic 10/11/17. Do not hesitate to contact the office if you have any questions or concerns before your next appointment.    Kind regards,   Dr. Alice Ortega

## 2017-10-13 ENCOUNTER — HOSPITAL ENCOUNTER (OUTPATIENT)
Dept: ULTRASOUND IMAGING | Age: 37
Discharge: HOME | End: 2017-10-13
Attending: INTERNAL MEDICINE
Payer: COMMERCIAL

## 2017-10-13 ENCOUNTER — TELEPHONE (OUTPATIENT)
Dept: INTERNAL MEDICINE CLINIC | Age: 37
End: 2017-10-13

## 2017-10-13 DIAGNOSIS — K76.0 HEPATIC STEATOSIS: Primary | ICD-10-CM

## 2017-10-13 DIAGNOSIS — R79.89 ELEVATED LFTS: ICD-10-CM

## 2017-10-13 LAB — S PYO THROAT QL CULT: NEGATIVE

## 2017-10-13 PROCEDURE — 76700 US EXAM ABDOM COMPLETE: CPT

## 2017-10-13 NOTE — PROGRESS NOTES
Hi [address]  It was a pleasure to speak to you today. As discussed: Your ultrasound shows that you  have increased fat in your liver. This is often a normal finding. We will recheck your liver tests in 8 weeks to see if it has improved. If the liver tests have not improved I would like you to see a liver specialist for further evaluation. Since her liver specialist usually scheduled his appointment so far in advance. I recommend you go ahead and schedule an appointment now and we can cancel it later if needed. His information is listed below. Your throat culture for strep is normal.  As discussed your fatigue is likely due from the resolving strep infection. Continue to stay well-hydrated and and conserve energy as much as possible. Your symptoms should start to improve in the next 2-4 weeks. If your symptoms worsen at any point or he spike a fever greater than 100.4 please come in for evaluation. Do not hesitate to contact the office if you have any questions or concerns before your next appointment.    Kind regards,  Dr. Negro Deal Specialist  Beltran Lockett MD  [phone]

## 2017-10-13 NOTE — PROGRESS NOTES
Hi [address]  It was a pleasure to speak to you today. As discussed: Your ultrasound shows that you  have increased fat in your liver. This is often a normal finding. We will recheck your liver tests in 8 weeks to see if it has improved. If the liver tests have not improved I would like you to see a liver specialist for further evaluation. Since her liver specialist usually scheduled his appointment so far in advance. I recommend you go ahead and schedule an appointment now and we can cancel it later if needed. His information is listed below. Your throat culture for strep is normal.  As discussed your fatigue is likely due from the resolving strep infection. Continue to stay well-hydrated and and conserve energy as much as possible. Your symptoms should start to improve in the next 2-4 weeks. If your symptoms worsen at any point or he spike a fever greater than 100.4 please come in for evaluation. Do not hesitate to contact the office if you have any questions or concerns before your next appointment.    Kind regards,  Dr. Jett Punch Specialist  Stephen Koch MD  [phone]

## 2017-10-13 NOTE — TELEPHONE ENCOUNTER
Hi [address]  It was a pleasure to speak to you today. As discussed: Your ultrasound shows that you  have increased fat in your liver. This is often a normal finding. We will recheck your liver tests in 8 weeks to see if it has improved. If the liver tests have not improved I would like you to see a liver specialist for further evaluation. Since her liver specialist usually scheduled his appointment so far in advance. I recommend you go ahead and schedule an appointment now and we can cancel it later if needed. His information is listed below. Your throat culture for strep is normal.  As discussed your fatigue is likely due from the resolving strep infection. Continue to stay well-hydrated and and conserve energy as much as possible. Your symptoms should start to improve in the next 2-4 weeks. If your symptoms worsen at any point or he spike a fever greater than 100.4 please come in for evaluation. Do not hesitate to contact the office if you have any questions or concerns before your next appointment.    Kind regards,  Dr. Monique Nicolas Specialist  Radha Grossman MD  [phone]

## 2017-11-24 ENCOUNTER — TELEPHONE (OUTPATIENT)
Dept: INTERNAL MEDICINE CLINIC | Age: 37
End: 2017-11-24

## 2017-11-24 DIAGNOSIS — Z01.84 IMMUNITY STATUS TESTING: Primary | ICD-10-CM

## 2017-11-24 NOTE — TELEPHONE ENCOUNTER
Informed patient lab slip includes liver function. Additional lab order for rabies titer ordered. Leaving lab slip at . Have labs on Mon-Tues, need to be fasting. Pt verbalized understanding.

## 2017-11-28 LAB
ALBUMIN SERPL-MCNC: 4.4 G/DL (ref 3.5–5.5)
ALBUMIN/GLOB SERPL: 1.5 {RATIO} (ref 1.2–2.2)
ALP SERPL-CCNC: 68 IU/L (ref 39–117)
ALT SERPL-CCNC: 27 IU/L (ref 0–32)
AST SERPL-CCNC: 28 IU/L (ref 0–40)
BILIRUB DIRECT SERPL-MCNC: 0.17 MG/DL (ref 0–0.4)
BILIRUB INDIRECT SERPL-MCNC: 0.43 MG/DL (ref 0.1–0.8)
BILIRUB SERPL-MCNC: 0.6 MG/DL (ref 0–1.2)
BUN SERPL-MCNC: 18 MG/DL (ref 6–20)
BUN/CREAT SERPL: 18 (ref 9–23)
CALCIUM SERPL-MCNC: 9.1 MG/DL (ref 8.7–10.2)
CHLORIDE SERPL-SCNC: 97 MMOL/L (ref 96–106)
CHOLEST SERPL-MCNC: 362 MG/DL (ref 100–199)
CO2 SERPL-SCNC: 25 MMOL/L (ref 18–29)
COMMENT, 011824: ABNORMAL
CREAT SERPL-MCNC: 1 MG/DL (ref 0.57–1)
GFR SERPLBLD CREATININE-BSD FMLA CKD-EPI: 72 ML/MIN/1.73
GFR SERPLBLD CREATININE-BSD FMLA CKD-EPI: 83 ML/MIN/1.73
GLOBULIN SER CALC-MCNC: 2.9 G/DL (ref 1.5–4.5)
GLUCOSE SERPL-MCNC: 86 MG/DL (ref 65–99)
HDLC SERPL-MCNC: 65 MG/DL
LDLC SERPL CALC-MCNC: 259 MG/DL (ref 0–99)
POTASSIUM SERPL-SCNC: 4.1 MMOL/L (ref 3.5–5.2)
PROT SERPL-MCNC: 7.3 G/DL (ref 6–8.5)
SODIUM SERPL-SCNC: 139 MMOL/L (ref 134–144)
TRIGL SERPL-MCNC: 191 MG/DL (ref 0–149)
VLDLC SERPL CALC-MCNC: 38 MG/DL (ref 5–40)

## 2017-11-30 ENCOUNTER — OFFICE VISIT (OUTPATIENT)
Dept: INTERNAL MEDICINE CLINIC | Age: 37
End: 2017-11-30

## 2017-11-30 VITALS
OXYGEN SATURATION: 99 % | WEIGHT: 123.2 LBS | BODY MASS INDEX: 21.83 KG/M2 | RESPIRATION RATE: 14 BRPM | HEART RATE: 78 BPM | TEMPERATURE: 97.9 F | HEIGHT: 63 IN | SYSTOLIC BLOOD PRESSURE: 102 MMHG | DIASTOLIC BLOOD PRESSURE: 70 MMHG

## 2017-11-30 DIAGNOSIS — G44.219 EPISODIC TENSION-TYPE HEADACHE, NOT INTRACTABLE: ICD-10-CM

## 2017-11-30 DIAGNOSIS — R79.89 ELEVATED LIVER FUNCTION TESTS: ICD-10-CM

## 2017-11-30 DIAGNOSIS — E78.2 MIXED HYPERLIPIDEMIA: Primary | ICD-10-CM

## 2017-11-30 DIAGNOSIS — M25.551 PAIN OF RIGHT HIP JOINT: ICD-10-CM

## 2017-11-30 NOTE — PROGRESS NOTES
HISTORY OF PRESENT ILLNESS  Mayelin Jones is a 40 y.o. female. HPI  Cardiovascular Review:  The patient has hyperlipidemia. Diet and Lifestyle: generally follows a low fat low cholesterol diet, generally follows a low sodium diet, nonsmoker  Home BP Monitoring: is not measured at home. Pertinent ROS: no TIA's, no chest pain on exertion, no dyspnea on exertion, no swelling of ankles. Hip Pain  Reports several months of right hip pain. Intermittent. Focal ttp  Worsened by activity. Denies radiation of pain or leg weakness. Review of Systems   Constitutional: Negative for diaphoresis, fever and weight loss. Eyes: Negative for blurred vision and pain. Respiratory: Negative for shortness of breath. Cardiovascular: Positive for chest pain (reports paroxysmal chest tightness <1 min associated with stress. No AOE). Negative for orthopnea and leg swelling. Neurological: Positive for headaches. Negative for focal weakness. Psychiatric/Behavioral: Negative for depression. Patient Active Problem List    Diagnosis Date Noted    Mixed hyperlipidemia 05/26/2017    Vitamin D insufficiency 05/26/2017    Seasonal affective disorder ([facility] 01/26/2017    [facility] 01/26/2017    Family history of renal cancer 01/26/2017       Current Outpatient Prescriptions   Medication Sig Dispense Refill    KAITLIB FE 0.8mg-25mcg(24) and 75 mg (4) chew TAKE 1 TABLET BY MOUTH EVERY DAY  12    klack's mixture Solution Take  by mouth every six (6) hours. Diphenhydramine elixir 12.5mg/ml 500ml, Nystatin suspension 120ml,Tetracycline 500mg capsules  12 capsules (6g), Hydrocortisone 20mg tablet 12 tablets (240mg), Water for irrigation QS ad 1000ml      omega-3 acid ethyl esters (LOVAZA) 1 gram capsule Take 2 Caps by mouth two (2) times a day. 60 Cap 4    mometasone (ELOCON) 0.1 % topical cream Apply  to affected area two (2) times a day.  30 g 1    NORETH-ETHINYL ESTRADIOL-IRON PO Take  by mouth. Allergies   Allergen Reactions    Sulfa (Sulfonamide Antibiotics) Rash      Visit Vitals    /70 (BP 1 Location: Right arm, BP Patient Position: Sitting)    Pulse 78    Temp 97.9 °F (36.6 °C) (Oral)    Resp 14    Ht 5' 3.35\" (1.609 m)    Wt 123 lb 3.2 oz (55.9 kg)    SpO2 99%    BMI 21.58 kg/m2       Physical Exam   Constitutional: She is oriented to person, place, and time. She appears well-developed. No distress. Eyes: Conjunctivae are normal.   Neck: Neck supple. Cardiovascular: Normal rate, regular rhythm and normal heart sounds. Pulmonary/Chest: Effort normal and breath sounds normal. No respiratory distress. She has no wheezes. She has no rales. She exhibits no tenderness. Neurological: She is alert and oriented to person, place, and time. Skin: Skin is warm. Psychiatric: She has a normal mood and affect. Lab Results  Component Value Date/Time   Cholesterol, total 362 11/27/2017 07:30 AM   HDL Cholesterol 65 11/27/2017 07:30 AM   LDL, calculated 259 11/27/2017 07:30 AM   Triglyceride 191 11/27/2017 07:30 AM     Lab Results  Component Value Date/Time   ALT (SGPT) 27 11/27/2017 07:30 AM   AST (SGOT) 28 11/27/2017 07:30 AM   Alk. phosphatase 68 11/27/2017 07:30 AM   Bilirubin, direct 0.17 11/27/2017 07:30 AM   Bilirubin, total 0.6 11/27/2017 07:30 AM   Albumin 4.4 11/27/2017 07:30 AM   Protein, total 7.3 11/27/2017 07:30 AM   PLATELET 684 18/23/2186 03:45 PM            ASSESSMENT and PLAN  Diagnoses and all orders for this visit:    1. Mixed hyperlipidemia- recheck lipids given marked evaluation within 6 months. If still high start Lipitor 20mg. We discussed risks vs benefits.   -     LIPID PANEL    2. Episodic tension-type headache, not intractable- prn Excedrin migraine. Red flags to warrant ER or earlier clinical evaluation reviewed. See AVS for full details of plan and patient discussion. 3. Elevated liver function tests-now wnl. Was due to ASO.  No need to see hepatology     4. Pain of right hip joint- given duration see Sports Medicine. Izabel Guerrero MD   [address] [place] [address],[address]  [place], [place]  Phone: [phone]  Fax: [phone]        Follow-up Disposition:  Return in about 6 months (around 5/30/2018) for Physical - 30 minute appointment. Medication risks/benefits/costs/interactions/alternatives discussed with patient. Coni Ibarra  was given an after visit summary which includes diagnoses, current medications, & vitals. she expressed understanding with the diagnosis and plan.

## 2017-11-30 NOTE — PROGRESS NOTES
Chief Complaint   Patient presents with    Cholesterol Problem     1. Have you been to the ER, urgent care clinic since your last visit? Hospitalized since your last visit? No    2. Have you seen or consulted any other health care providers outside of the [address] [place] [address] since your last visit? Include any pap smears or colon screening.  No

## 2017-11-30 NOTE — PATIENT INSTRUCTIONS
It was a pleasure to see you! As discussed: Your liver test has improved. Please cancel with the liver specialist.     Your cholesterol is more elevated. Repeat the test to confirm. If it is still high we will start atorvastatin 20mg   Continue to work on  healthy eating and cardiovascular disease (Recommendation: reduce carbs to 150-200g/ day and the Mediterranean Diet). Hip Pain  I recommend you schedule with   Afsaneh Bennett MD   [address] [place] [address],[address]  [facility], [address]  Phone: [phone]  Fax: [phone]           Hip Pain: Care Instructions  Your Care Instructions  Hip pain may be caused by many things, including overuse, a fall, or a twisting movement. Another cause of hip pain is arthritis. Your pain may increase when you stand up, walk, or squat. The pain may come and go or may be constant. Home treatment can help relieve hip pain, swelling, and stiffness. If your pain is ongoing, you may need more tests and treatment. Follow-up care is a key part of your treatment and safety. Be sure to make and go to all appointments, and call your doctor if you are having problems. It's also a good idea to know your test results and keep a list of the medicines you take. How can you care for yourself at home? · Take pain medicines exactly as directed. ¨ If the doctor gave you a prescription medicine for pain, take it as prescribed. ¨ If you are not taking a prescription pain medicine, ask your doctor if you can take an over-the-counter medicine. · Rest and protect your hip. Take a break from any activity, including standing or walking, that may cause pain. · Put ice or a cold pack against your hip for 10 to 20 minutes at a time. Try to do this every 1 to 2 hours for the next 3 days (when you are awake) or until the swelling goes down. Put a thin cloth between the ice and your skin.   · Sleep on your healthy side with a pillow between your knees, or sleep on your back with pillows under your knees. · If there is no swelling, you can put moist heat, a heating pad, or a warm cloth on your hip. Do gentle stretching exercises to help keep your hip flexible. · Learn how to prevent falls. Have your vision and hearing checked regularly. Wear slippers or shoes with a nonskid sole. · Stay at a healthy weight. · Wear comfortable shoes. When should you call for help? Call 911 anytime you think you may need emergency care. For example, call if:  ? · You have sudden chest pain and shortness of breath, or you cough up blood. ? · You are not able to stand or walk or bear weight. ? · Your buttocks, legs, or feet feel numb or tingly. ? · Your leg or foot is cool or pale or changes color. ? · You have severe pain. ?Call your doctor now or seek immediate medical care if:  ? · You have signs of infection, such as:  ¨ Increased pain, swelling, warmth, or redness in the hip area. ¨ Red streaks leading from the hip area. ¨ Pus draining from the hip area. ¨ A fever. ? · You have signs of a blood clot, such as:  ¨ Pain in your calf, back of the knee, thigh, or groin. ¨ Redness and swelling in your leg or groin. ? · You are not able to bend, straighten, or move your leg normally. ? · You have trouble urinating or having bowel movements. ? Watch closely for changes in your health, and be sure to contact your doctor if:  ? · You do not get better as expected. Where can you learn more? Go to http://cristina-mily.info/. Enter T982 in the search box to learn more about \"Hip Pain: Care Instructions. \"  Current as of: March 20, 2017  Content Version: 11.4  © 4590-4424 [employer]. Care instructions adapted under license by [employer] (which disclaims liability or warranty for this information).  If you have questions about a medical condition or this instruction, always ask your healthcare professional. [address]  any warranty or liability for your use of this information. Musculoskeletal Chest Pain: Care Instructions  Your Care Instructions    Chest pain is not always a sign that something is wrong with your heart or that you have another serious problem. The doctor thinks your chest pain is caused by strained muscles or ligaments, inflamed chest cartilage, or another problem in your chest, rather than by your heart. You may need more tests to find the cause of your chest pain. Follow-up care is a key part of your treatment and safety. Be sure to make and go to all appointments, and call your doctor if you are having problems. It's also a good idea to know your test results and keep a list of the medicines you take. How can you care for yourself at home? · Take pain medicines exactly as directed. ¨ If the doctor gave you a prescription medicine for pain, take it as prescribed. ¨ If you are not taking a prescription pain medicine, ask your doctor if you can take an over-the-counter medicine. · Rest and protect the sore area. · Stop, change, or take a break from any activity that may be causing your pain or soreness. · Put ice or a cold pack on the sore area for 10 to 20 minutes at a time. Try to do this every 1 to 2 hours for the next 3 days (when you are awake) or until the swelling goes down. Put a thin cloth between the ice and your skin. · After 2 or 3 days, apply a heating pad set on low or a warm cloth to the area that hurts. Some doctors suggest that you go back and forth between hot and cold. · Do not wrap or tape your ribs for support. This may cause you to take smaller breaths, which could increase your risk of lung problems. · Mentholated creams such as Bengay or Icy Hot may soothe sore muscles. Follow the instructions on the package. · Follow your doctor's instructions for exercising. · Gentle stretching and massage may help you get better faster.  Stretch slowly to the point just before pain begins, and hold the stretch for at least 15 to 30 seconds. Do this 3 or 4 times a day. Stretch just after you have applied heat. · As your pain gets better, slowly return to your normal activities. Any increased pain may be a sign that you need to rest a while longer. When should you call for help? Call 911 anytime you think you may need emergency care. For example, call if:  ? · You have chest pain or pressure. This may occur with:  ¨ Sweating. ¨ Shortness of breath. ¨ Nausea or vomiting. ¨ Pain that spreads from the chest to the neck, jaw, or one or both shoulders or arms. ¨ Dizziness or lightheadedness. ¨ A fast or uneven pulse. After calling 911, chew 1 adult-strength aspirin. Wait for an ambulance. Do not try to drive yourself. ? · You have sudden chest pain and shortness of breath, or you cough up blood. ?Call your doctor now or seek immediate medical care if:  ? · You have any trouble breathing. ? · Your chest pain gets worse. ? · Your chest pain occurs consistently with exercise and is relieved by rest.   ? Watch closely for changes in your health, and be sure to contact your doctor if:  ? · Your chest pain does not get better after 1 week. Where can you learn more? Go to http://cristina-mily.info/. Enter V293 in the search box to learn more about \"Musculoskeletal Chest Pain: Care Instructions. \"  Current as of: March 20, 2017  Content Version: 11.4  © 9628-9367 [employer]. Care instructions adapted under license by [employer] (which disclaims liability or warranty for this information). If you have questions about a medical condition or this instruction, always ask your healthcare professional. [place] [address] any warranty or liability for your use of this information.

## 2017-11-30 NOTE — MR AVS SNAPSHOT
Visit Information Date & Time Provider Department Dept. Phone Encounter #  
 11/30/2017  9:30 AM Eliza Schaumann, MD [facility] Internal Medicine [phone] [id number] Follow-up Instructions Return in about 6 months (around 5/30/2018) for Physical - 30 minute appointment. Your Appointments 1/23/2018  3:30 PM  
New Patient with MD Kranthi Neil 75 ([facility]) Appt Note: Np, Fatty Liver, johnathon 10.16.17  
 [address] [facility] [place] [phone] [facility] [id number]  
[address] [facility] [address] Upcoming Health Maintenance Date Due  
 PAP AKA CERVICAL CYTOLOGY 6/14/2018 DTaP/Tdap/Td series (2 - Td) 1/1/2024 Allergies as of 11/30/2017  Review Complete On: 11/30/2017 By: Eliza Schaumann, MD  
  
 Severity Noted Reaction Type Reactions Sulfa (Sulfonamide Antibiotics)  01/26/2017    Rash Current Immunizations  Reviewed on 5/9/2017 Name Date Influenza Vaccine (Quad) PF 9/29/2017 Not reviewed this visit You Were Diagnosed With   
  
 Codes Comments Mixed hyperlipidemia    -  Primary ICD-10-CM: I28.8 ICD-9-CM: 272.2 Episodic tension-type headache, not intractable     ICD-10-CM: H55.746 ICD-9-CM: 339.11 Elevated liver function tests     ICD-10-CM: R79.89 ICD-9-CM: 790.6 Pain of right hip joint     ICD-10-CM: M25.551 ICD-9-CM: 719.45 Vitals BP Pulse Temp Resp Height(growth percentile) Weight(growth percentile) 102/70 (BP 1 Location: Right arm, BP Patient Position: Sitting) 78 97.9 °F (36.6 °C) (Oral) 14 5' 3.35\" (1.609 m) 123 lb 3.2 oz (55.9 kg) SpO2 BMI OB Status Smoking Status 99% 21.58 kg/m2 Medically Induced Never Smoker Vitals History BMI and BSA Data Body Mass Index Body Surface Area  
 21.58 kg/m 2 1.58 m 2 Preferred Pharmacy Pharmacy Name Phone [id number] [phone] Your Updated Medication List  
  
   
This list is accurate as of: 11/30/17 10:24 AM.  Always use your most recent med list.  
  
  
  
  
 klack's mixture Solution Take  by mouth every six (6) hours. Diphenhydramine elixir 12.5mg/ml 500ml, Nystatin suspension 120ml,Tetracycline 500mg capsules  12 capsules (6g), Hydrocortisone 20mg tablet 12 tablets (240mg), Water for irrigation QS ad 1000ml  
  
 mometasone 0.1 % topical cream  
Commonly known as:  Maria Del Carmen Howell Apply  to affected area two (2) times a day. * NORETH-ETHINYL ESTRADIOL-IRON PO Take  by mouth. * KAITLIB FE 0.8mg-25mcg(24) and 75 mg (4) Chew Generic drug:  noreth-ethinyl estradiol-iron TAKE 1 TABLET BY MOUTH EVERY DAY  
  
 omega-3 acid ethyl esters 1 gram capsule Commonly known as:  Anthony [place] Take 2 Caps by mouth two (2) times a day. * Notice: This list has 2 medication(s) that are the same as other medications prescribed for you. Read the directions carefully, and ask your doctor or other care provider to review them with you. We Performed the Following LIPID PANEL [96723 CPT(R)] Follow-up Instructions Return in about 6 months (around 5/30/2018) for Physical - 30 minute appointment. Patient Instructions It was a pleasure to see you! As discussed: Your liver test has improved. Please cancel with the liver specialist.  
 
Your cholesterol is more elevated. Repeat the test to confirm. If it is still high we will start atorvastatin 20mg Continue to work on  healthy eating and cardiovascular disease (Recommendation: reduce carbs to 150-200g/ day and the Mediterranean Diet). Hip Pain I recommend you schedule with Zoran Carroll MD  
[facility] [address] [place] [address],[address] [facility], [address] Phone: [phone] Fax: [phone] Hip Pain: Care Instructions Your Care Instructions Hip pain may be caused by many things, including overuse, a fall, or a twisting movement. Another cause of hip pain is arthritis. Your pain may increase when you stand up, walk, or squat. The pain may come and go or may be constant. Home treatment can help relieve hip pain, swelling, and stiffness. If your pain is ongoing, you may need more tests and treatment. Follow-up care is a key part of your treatment and safety. Be sure to make and go to all appointments, and call your doctor if you are having problems. It's also a good idea to know your test results and keep a list of the medicines you take. How can you care for yourself at home? · Take pain medicines exactly as directed. ¨ If the doctor gave you a prescription medicine for pain, take it as prescribed. ¨ If you are not taking a prescription pain medicine, ask your doctor if you can take an over-the-counter medicine. · Rest and protect your hip. Take a break from any activity, including standing or walking, that may cause pain. · Put ice or a cold pack against your hip for 10 to 20 minutes at a time. Try to do this every 1 to 2 hours for the next 3 days (when you are awake) or until the swelling goes down. Put a thin cloth between the ice and your skin. · Sleep on your healthy side with a pillow between your knees, or sleep on your back with pillows under your knees. · If there is no swelling, you can put moist heat, a heating pad, or a warm cloth on your hip. Do gentle stretching exercises to help keep your hip flexible. · Learn how to prevent falls. Have your vision and hearing checked regularly. Wear slippers or shoes with a nonskid sole. · Stay at a healthy weight. · Wear comfortable shoes. When should you call for help? Call 911 anytime you think you may need emergency care. For example, call if: 
? · You have sudden chest pain and shortness of breath, or you cough up blood. ? · You are not able to stand or walk or bear weight. ? · Your buttocks, legs, or feet feel numb or tingly. ? · Your leg or foot is cool or pale or changes color. ? · You have severe pain. ?Call your doctor now or seek immediate medical care if: 
? · You have signs of infection, such as: 
¨ Increased pain, swelling, warmth, or redness in the hip area. ¨ Red streaks leading from the hip area. ¨ Pus draining from the hip area. ¨ A fever. ? · You have signs of a blood clot, such as: 
¨ Pain in your calf, back of the knee, thigh, or groin. ¨ Redness and swelling in your leg or groin. ? · You are not able to bend, straighten, or move your leg normally. ? · You have trouble urinating or having bowel movements. ? Watch closely for changes in your health, and be sure to contact your doctor if: 
? · You do not get better as expected. Where can you learn more? Go to http://cristina-mily.info/. Enter R816 in the search box to learn more about \"Hip Pain: Care Instructions. \" Current as of: March 20, 2017 Content Version: 11.4 © 5203-3468 MobileSpan. Care instructions adapted under license by [employer] (which disclaims liability or warranty for this information). If you have questions about a medical condition or this instruction, always ask your healthcare professional. [place] [address] any warranty or liability for your use of this information. Musculoskeletal Chest Pain: Care Instructions Your Care Instructions Chest pain is not always a sign that something is wrong with your heart or that you have another serious problem. The doctor thinks your chest pain is caused by strained muscles or ligaments, inflamed chest cartilage, or another problem in your chest, rather than by your heart. You may need more tests to find the cause of your chest pain. Follow-up care is a key part of your treatment and safety.  Be sure to make and go to all appointments, and call your doctor if you are having problems. It's also a good idea to know your test results and keep a list of the medicines you take. How can you care for yourself at home? · Take pain medicines exactly as directed. ¨ If the doctor gave you a prescription medicine for pain, take it as prescribed. ¨ If you are not taking a prescription pain medicine, ask your doctor if you can take an over-the-counter medicine. · Rest and protect the sore area. · Stop, change, or take a break from any activity that may be causing your pain or soreness. · Put ice or a cold pack on the sore area for 10 to 20 minutes at a time. Try to do this every 1 to 2 hours for the next 3 days (when you are awake) or until the swelling goes down. Put a thin cloth between the ice and your skin. · After 2 or 3 days, apply a heating pad set on low or a warm cloth to the area that hurts. Some doctors suggest that you go back and forth between hot and cold. · Do not wrap or tape your ribs for support. This may cause you to take smaller breaths, which could increase your risk of lung problems. · Mentholated creams such as Bengay or Icy Hot may soothe sore muscles. Follow the instructions on the package. · Follow your doctor's instructions for exercising. · Gentle stretching and massage may help you get better faster. Stretch slowly to the point just before pain begins, and hold the stretch for at least 15 to 30 seconds. Do this 3 or 4 times a day. Stretch just after you have applied heat. · As your pain gets better, slowly return to your normal activities. Any increased pain may be a sign that you need to rest a while longer. When should you call for help? Call 911 anytime you think you may need emergency care. For example, call if: 
? · You have chest pain or pressure. This may occur with: ¨ Sweating. ¨ Shortness of breath. ¨ Nausea or vomiting. ¨ Pain that spreads from the chest to the neck, jaw, or one or both shoulders or arms. ¨ Dizziness or lightheadedness. ¨ A fast or uneven pulse. After calling 911, chew 1 adult-strength aspirin. Wait for an ambulance. Do not try to drive yourself. ? · You have sudden chest pain and shortness of breath, or you cough up blood. ?Call your doctor now or seek immediate medical care if: 
? · You have any trouble breathing. ? · Your chest pain gets worse. ? · Your chest pain occurs consistently with exercise and is relieved by rest. ? Watch closely for changes in your health, and be sure to contact your doctor if: 
? · Your chest pain does not get better after 1 week. Where can you learn more? Go to http://cristina-mily.info/. Enter V293 in the search box to learn more about \"Musculoskeletal Chest Pain: Care Instructions. \" Current as of: March 20, 2017 Content Version: 11.4 © 2390-3019 PluggedIn. Care instructions adapted under license by [employer] (which disclaims liability or warranty for this information). If you have questions about a medical condition or this instruction, always ask your healthcare professional. [place] [address] any warranty or liability for your use of this information. Introducing [facility]! Dear Celesta Alpers: Thank you for requesting a [employer] account. Our records indicate that you already have an active [employer] account. You can access your account anytime at [email or web address] Did you know that you can access your hospital and ER discharge instructions at any time in [employer]? You can also review all of your test results from your hospital stay or ER visit. Additional Information If you have questions, please visit the Frequently Asked Questions section of the [employer] website at [email or web address] Remember, MyChart is NOT to be used for urgent needs. For medical emergencies, dial 911. Now available from your iPhone and Android! Please provide this summary of care documentation to your next provider. Your primary care clinician is listed as Jillian Colmenares. If you have any questions after today's visit, please call [phone].

## 2017-12-03 LAB
CHOLEST SERPL-MCNC: 383 MG/DL (ref 100–199)
COMMENT, 011824: ABNORMAL
HDLC SERPL-MCNC: 73 MG/DL
LDLC SERPL CALC-MCNC: 287 MG/DL (ref 0–99)
TRIGL SERPL-MCNC: 113 MG/DL (ref 0–149)
VLDLC SERPL CALC-MCNC: 23 MG/DL (ref 5–40)

## 2017-12-06 DIAGNOSIS — E78.2 MIXED HYPERLIPIDEMIA: Primary | ICD-10-CM

## 2017-12-06 RX ORDER — ATORVASTATIN CALCIUM 40 MG/1
TABLET, FILM COATED ORAL
Qty: 30 TAB | Refills: 4 | Status: SHIPPED | OUTPATIENT
Start: 2017-12-06 | End: 2018-01-23 | Stop reason: SINTOL

## 2017-12-06 NOTE — PROGRESS NOTES
[address] [place] [address]  Thank you for using my chart. As you have seen your cholesterol is still elevated. As we discussed at your recent appointment I have ordered atorvastatin for your cholesterol management. We will recheck your levels in 4 weeks. We will have the office track down your rabies titers. They can take a while to return. Do not hesitate to contact the office if you have any questions or concerns before your next appointment.    Kind regards,   Dr. Marcos Schrader

## 2017-12-06 NOTE — PROGRESS NOTES
Gurpreet Oconnell states she had it drawn 11/27/17 and this test normally takes 3 to 4 weeks to complete , approx by the end of DEcemeber or beginning of January

## 2017-12-13 LAB — RABIES NEUT. AB TITRATION, 804431: NORMAL IU/ML

## 2017-12-13 NOTE — TELEPHONE ENCOUNTER
Hi Ms. Nat Raymon,   Your titer indicates you are immune to rabies. We will recheck your immunity in 1-2 years. Do not hesitate to contact the office if you have any questions or concerns before your next appointment.    Kind regards,   Dr. Garner Pap

## 2017-12-13 NOTE — PROGRESS NOTES
Discussed on InThrMahart 12/6/17  Hi [address]  Thank you for using my chart. As you have seen your cholesterol is still elevated. As we discussed at your recent appointment I have ordered atorvastatin for your cholesterol management. We will recheck your levels in 4 months    We will have the office track down your rabies titers. They can take a while to return. Do not hesitate to contact the office if you have any questions or concerns before your next appointment.    Kind regards,   Dr. Jimmy Montez

## 2017-12-29 ENCOUNTER — TELEPHONE (OUTPATIENT)
Dept: INTERNAL MEDICINE CLINIC | Age: 37
End: 2017-12-29

## 2017-12-29 DIAGNOSIS — M79.10 MUSCLE SORENESS: Primary | ICD-10-CM

## 2017-12-29 LAB
ALBUMIN SERPL-MCNC: 4.7 G/DL (ref 3.5–5.5)
ALBUMIN/GLOB SERPL: 1.6 {RATIO} (ref 1.2–2.2)
ALP SERPL-CCNC: 62 IU/L (ref 39–117)
ALT SERPL-CCNC: 30 IU/L (ref 0–32)
AST SERPL-CCNC: 48 IU/L (ref 0–40)
BILIRUB SERPL-MCNC: 2.2 MG/DL (ref 0–1.2)
BUN SERPL-MCNC: 19 MG/DL (ref 6–20)
BUN/CREAT SERPL: 15 (ref 9–23)
CALCIUM SERPL-MCNC: 9.4 MG/DL (ref 8.7–10.2)
CHLORIDE SERPL-SCNC: 95 MMOL/L (ref 96–106)
CK SERPL-CCNC: 553 U/L (ref 24–173)
CO2 SERPL-SCNC: 28 MMOL/L (ref 18–29)
CREAT SERPL-MCNC: 1.3 MG/DL (ref 0.57–1)
ERYTHROCYTE [SEDIMENTATION RATE] IN BLOOD BY WESTERGREN METHOD: 22 MM/HR (ref 0–32)
GLOBULIN SER CALC-MCNC: 2.9 G/DL (ref 1.5–4.5)
GLUCOSE SERPL-MCNC: 72 MG/DL (ref 65–99)
POTASSIUM SERPL-SCNC: 4.7 MMOL/L (ref 3.5–5.2)
PROT SERPL-MCNC: 7.6 G/DL (ref 6–8.5)
SODIUM SERPL-SCNC: 137 MMOL/L (ref 134–144)

## 2017-12-29 NOTE — TELEPHONE ENCOUNTER
Patient explains she has notice muscle weakness in her arms upon waking and at brushing her teeth.  She describes soreness and weakness in the last 2 weeks while taking Atorvastatin 40 mg daily, please advise

## 2017-12-30 NOTE — TELEPHONE ENCOUNTER
On Call Note  Stat labs obtained from [facility].  Noted elevated CK, elevated Tbili. Called the patient. Advised stopping the statin and staying hydrated during the weekend. She was also instructed to call the office on Tuesday for repeat labs. Also discussed indicators for ED evaluation--worsened weakness or muscle pain, evidence of increasing jaundice or the development of abdominal pain.

## 2018-01-02 ENCOUNTER — OFFICE VISIT (OUTPATIENT)
Dept: INTERNAL MEDICINE CLINIC | Age: 38
End: 2018-01-02

## 2018-01-02 VITALS
SYSTOLIC BLOOD PRESSURE: 109 MMHG | OXYGEN SATURATION: 100 % | TEMPERATURE: 98 F | HEART RATE: 71 BPM | BODY MASS INDEX: 22.79 KG/M2 | DIASTOLIC BLOOD PRESSURE: 66 MMHG | WEIGHT: 128.6 LBS | HEIGHT: 63 IN | RESPIRATION RATE: 14 BRPM

## 2018-01-02 DIAGNOSIS — E78.01 FAMILIAL HYPERCHOLESTEREMIA: ICD-10-CM

## 2018-01-02 DIAGNOSIS — R94.4 DECREASED GFR: ICD-10-CM

## 2018-01-02 DIAGNOSIS — R74.8 ELEVATED CPK: Primary | ICD-10-CM

## 2018-01-02 DIAGNOSIS — E78.2 MIXED HYPERLIPIDEMIA: ICD-10-CM

## 2018-01-02 DIAGNOSIS — R10.13 EPIGASTRIC PAIN: ICD-10-CM

## 2018-01-02 LAB
ALBUMIN SERPL-MCNC: 5 G/DL (ref 3.5–5.5)
ALBUMIN/GLOB SERPL: 1.5 {RATIO} (ref 1.2–2.2)
ALP SERPL-CCNC: 63 IU/L (ref 39–117)
ALT SERPL-CCNC: 33 IU/L (ref 0–32)
AST SERPL-CCNC: 43 IU/L (ref 0–40)
BILIRUB SERPL-MCNC: 1.6 MG/DL (ref 0–1.2)
BILIRUB UR QL STRIP: NEGATIVE
BUN SERPL-MCNC: 18 MG/DL (ref 6–20)
BUN/CREAT SERPL: 15 (ref 9–23)
CALCIUM SERPL-MCNC: 9.7 MG/DL (ref 8.7–10.2)
CHLORIDE SERPL-SCNC: 94 MMOL/L (ref 96–106)
CK SERPL-CCNC: 600 U/L (ref 24–173)
CO2 SERPL-SCNC: 26 MMOL/L (ref 18–29)
CREAT SERPL-MCNC: 1.2 MG/DL (ref 0.57–1)
GLOBULIN SER CALC-MCNC: 3.3 G/DL (ref 1.5–4.5)
GLUCOSE SERPL-MCNC: 83 MG/DL (ref 65–99)
GLUCOSE UR-MCNC: NEGATIVE MG/DL
KETONES P FAST UR STRIP-MCNC: NEGATIVE MG/DL
PH UR STRIP: 7 [PH] (ref 4.6–8)
POTASSIUM SERPL-SCNC: 4.1 MMOL/L (ref 3.5–5.2)
PROT SERPL-MCNC: 8.3 G/DL (ref 6–8.5)
PROT UR QL STRIP: NEGATIVE
SODIUM SERPL-SCNC: 138 MMOL/L (ref 134–144)
SP GR UR STRIP: 1.02 (ref 1–1.03)
UA UROBILINOGEN AMB POC: NORMAL (ref 0.2–1)
URINALYSIS CLARITY POC: CLEAR
URINALYSIS COLOR POC: YELLOW
URINE BLOOD POC: NEGATIVE
URINE LEUKOCYTES POC: NEGATIVE
URINE NITRITES POC: NEGATIVE

## 2018-01-02 NOTE — PROGRESS NOTES
HISTORY OF PRESENT ILLNESS  Tereza Garcia is a 40 y.o. female. HPI  Myalgia  Since starting statin she noticed increased myalgias and cramping.  +Malaise. Symptoms worse in her bilateral upper extremities having difficulty raising her arms to comb her hair. Denies fevers, chills, rashes, nausea, or vomiting. Cardiovascular Review:  The patient has hyperlipidemia. Diet and Lifestyle: generally follows a low fat low cholesterol diet, generally follows a low sodium diet, exercises regularly, nonsmoker been attempting to follow a Mediterranean diet however has difficulty given that she is a vegetarian. Has several questions about diet. Home BP Monitoring: is not measured at home. Pertinent ROS: taking medications as instructed, side effects noted by patient include myalgias, no TIA's, no chest pain on exertion, no dyspnea on exertion, no swelling of ankles. Abdominal Pain  Patient complains of abdominal pain. The pain is described as sharp, and is severe in intensity. Pain is located in the epigastric without radiation. Onset was several weeks ago. Symptoms have been unchanged since. Aggravating factors: recumbency and lack of food. .  Alleviating factors: eating. Associated symptoms: none. The patient denies belching, constipation, diarrhea, headache and vomiting. Review of Systems   Constitutional: Negative for diaphoresis, fever and weight loss. Eyes: Negative for blurred vision and pain. Respiratory: Negative for shortness of breath. Cardiovascular: Negative for chest pain, orthopnea and leg swelling. Neurological: Negative for focal weakness and headaches. Psychiatric/Behavioral: Positive for depression (recurrent has been on antidepressant in past. Zoloft she thinks ). Negative for suicidal ideas.      Patient Active Problem List    Diagnosis Date Noted    Mixed hyperlipidemia 05/26/2017    Vitamin D insufficiency 05/26/2017    [facility]al [facility] 01/26/2017    Depression, major, in remission ([facility] 01/26/2017    Family history of renal cancer 01/26/2017       Current Outpatient Prescriptions   Medication Sig Dispense Refill    KAITLIB FE 0.8mg-25mcg(24) and 75 mg (4) chew TAKE 1 TABLET BY MOUTH EVERY DAY  12    atorvastatin (LIPITOR) 40 mg tablet Week One:  take half tablet by mouth nightly. Week 2  And beyond: Take 1 tab by mouth nightly 30 Tab 4    klack's mixture Solution Take  by mouth every six (6) hours. Diphenhydramine elixir 12.5mg/ml 500ml, Nystatin suspension 120ml,Tetracycline 500mg capsules  12 capsules (6g), Hydrocortisone 20mg tablet 12 tablets (240mg), Water for irrigation QS ad 1000ml      omega-3 acid ethyl esters (LOVAZA) 1 gram capsule Take 2 Caps by mouth two (2) times a day. 60 Cap 4    mometasone (ELOCON) 0.1 % topical cream Apply  to affected area two (2) times a day. 30 g 1    NORETH-ETHINYL ESTRADIOL-IRON PO Take  by mouth. Allergies   Allergen Reactions    Sulfa (Sulfonamide Antibiotics) Rash      Visit Vitals    /66 (BP 1 Location: Right arm, BP Patient Position: Sitting)    Pulse 71    Temp 98 °F (36.7 °C) (Oral)    Resp 14    Ht 5' 3.35\" (1.609 m)    Wt 128 lb 9.6 oz (58.3 kg)    SpO2 100%    BMI 22.53 kg/m2       Physical Exam   Constitutional: She is oriented to person, place, and time. She appears well-developed. No distress. Eyes: Conjunctivae are normal.   Neck: Neck supple. No thyromegaly present. Cardiovascular: Normal rate, regular rhythm and normal heart sounds. Pulmonary/Chest: Effort normal and breath sounds normal. No respiratory distress. She has no wheezes. She has no rales. She exhibits no tenderness. Abdominal: Bowel sounds are normal. There is tenderness (epigastrium ). There is no rebound and no guarding. Lymphadenopathy:     She has no cervical adenopathy. Neurological: She is alert and oriented to person, place, and time. Skin: Skin is warm.    Psychiatric: She has a normal mood and affect. Lab Results  Component Value Date/Time   WBC 9.1 10/11/2017 03:45 PM   HGB 13.3 10/11/2017 03:45 PM   HCT 39.1 10/11/2017 03:45 PM   PLATELET 183 99/19/5418 03:45 PM   MCV 91 10/11/2017 03:45 PM     Lab Results  Component Value Date/Time   Hemoglobin A1c 5.4 05/18/2017 09:14 AM   Glucose 72 12/29/2017 03:44 PM   LDL, calculated 287 12/02/2017 11:00 AM   Creatinine 1.30 12/29/2017 03:44 PM      Lab Results  Component Value Date/Time   Cholesterol, total 383 12/02/2017 11:00 AM   HDL Cholesterol 73 12/02/2017 11:00 AM   LDL, calculated 287 12/02/2017 11:00 AM   Triglyceride 113 12/02/2017 11:00 AM     Lab Results  Component Value Date/Time   ALT (SGPT) 30 12/29/2017 03:44 PM   AST (SGOT) 48 12/29/2017 03:44 PM   Alk.  phosphatase 62 12/29/2017 03:44 PM   Bilirubin, direct 0.17 11/27/2017 07:30 AM   Bilirubin, total 2.2 12/29/2017 03:44 PM   Albumin 4.7 12/29/2017 03:44 PM   Protein, total 7.6 12/29/2017 03:44 PM   PLATELET 276 07/64/9223 03:45 PM       Lab Results  Component Value Date/Time   GFR est non-AA 53 12/29/2017 03:44 PM   GFR est AA 61 12/29/2017 03:44 PM   Creatinine 1.30 12/29/2017 03:44 PM   BUN 19 12/29/2017 03:44 PM   Sodium 137 12/29/2017 03:44 PM   Potassium 4.7 12/29/2017 03:44 PM   Chloride 95 12/29/2017 03:44 PM   CO2 28 12/29/2017 03:44 PM     Lab Results  Component Value Date/Time   TSH 0.795 05/18/2017 09:14 AM   T3 Uptake 22 05/18/2017 09:14 AM   T4, Total 11.3 05/18/2017 09:14 AM      Lab Results   Component Value Date/Time    Glucose 72 12/29/2017 03:44 PM       CK: 453 (12/29/17)  Recent Results (from the past 12 hour(s))   AMB POC URINALYSIS DIP STICK AUTO W/O MICRO    Collection Time: 01/02/18  2:45 PM   Result Value Ref Range    Color (UA POC) Yellow     Clarity (UA POC) Clear     Glucose (UA POC) Negative Negative    Bilirubin (UA POC) Negative Negative    Ketones (UA POC) Negative Negative    Specific gravity (UA POC) 1.020 1.001 - 1.035    Blood (UA POC) Negative Negative    pH (UA POC) 7.0 4.6 - 8.0    Protein (UA POC) Negative Negative    Urobilinogen (UA POC) 0.2 mg/dL 0.2 - 1    Nitrites (UA POC) Negative Negative    Leukocyte esterase (UA POC) Negative Negative       ASSESSMENT and PLAN  Diagnoses and all orders for this visit:    1. Elevated CPK-suspect mild case of rhabdomyolysis which is likely resolved given her normal urinalysis in the office today. We will recheck CPK to ensure that is downtrending. If it has increased then we will check for other causes of muscle breakdown such as myositis especially given her nonspecific symptoms over the past few months. Red flags to warrant ER or earlier clinical evaluation reviewed. -     AMB POC URINALYSIS DIP STICK AUTO W/O MICRO  -     CK  -     METABOLIC PANEL, COMPREHENSIVE    2. Mixed hyperlipidemia-    3. Decreased GFR- likely from resolving rhabdomyolysis and mild volume depletion. Check for resolution.   -     METABOLIC PANEL, COMPREHENSIVE    4. Familial hypercholesteremia- given statin intolerance and fhx would benefit from seeing cardiology to discuss other treatment options.   -     REFERRAL TO CARDIOLOGY    5. Epigastric pain- check for h pylori. Has hepatology appt scheduled. If no EGD done will refer to GI  -     H PYLORI AG, STOOL    6. Depression- recurrent. Will order Zoloft/ prior antidepressant once confirmed by patient. We will also need her labs before ordering medication. Follow-up Disposition:  Return in about 4 months (around 5/2/2018) for Follow-up. Medication risks/benefits/costs/interactions/alternatives discussed with patient. Patti Obrien  was given an after visit summary which includes diagnoses, current medications, & vitals. she expressed understanding with the diagnosis and plan.

## 2018-01-02 NOTE — PROGRESS NOTES
Chief Complaint   Patient presents with    Cholesterol Problem     1. Have you been to the ER, urgent care clinic since your last visit? Hospitalized since your last visit? No    2. Have you seen or consulted any other health care providers outside of the [address] [place] [address] since your last visit? Include any pap smears or colon screening.  No    Muscle fatigue and pain

## 2018-01-02 NOTE — PATIENT INSTRUCTIONS
It was a pleasure to see you! As discussed:  High Cholesterol   Schedule with cardiology   Schedule with nutritionist   Odalys Butler  Nutrition    [phone]    Depression    Please confirm the medication you were taking and I will order it provided your liver tests are normal.     Rhabdomyolysis: Care Instructions  Your Care Instructions  When you have rhabdomyolysis (say \"ghd-xav-mf-AH-miguel-troy\"), dying muscle cells cause toxins to build up in the blood. If not treated, it can cause life-threatening damage to the body's organs. It can be caused by many things, such as severe muscle injury, some medicines (like statins), the flu, and certain blood infections. Symptoms may include weak muscles, pain, stiffness, fever, and nausea. Your urine may also be dark. You will get treatment in the hospital. If possible, the doctor will stop the cause of muscle cell death. The doctor will take steps to protect your organs. You may have to stop taking certain medicines if they are the cause of the problem. You will also get treatment to help the kidneys remove the toxins from your blood. This includes plenty of fluids. You may get fluids through a vein (by IV). You may also need dialysis. Follow-up care is a key part of your treatment and safety. Be sure to make and go to all appointments, and call your doctor if you are having problems. It's also a good idea to know your test results and keep a list of the medicines you take. How can you care for yourself at home? · Take pain medicines exactly as directed. ¨ If the doctor gave you a prescription medicine for pain, take it as prescribed. ¨ If you are not taking a prescription pain medicine, ask your doctor if you can take an over-the-counter medicine. · Talk to your doctor about whether you need to stop taking any medicines. Follow your doctor's instructions about stopping medicines.   · Drink plenty of fluids, enough so that your urine is light yellow or clear like water. If you have kidney, heart, or liver disease and have to limit fluids, talk with your doctor before you increase the amount of fluids you drink. When should you call for help? Call your doctor now or seek immediate medical care if:  ? · You have new or worse muscle pain. ? · You have less urine than normal or no urine. ? · You have new swelling in your arms or feet. ? · You have blood in your urine. ? Watch closely for changes in your health, and be sure to contact your doctor if you do not get better as expected. Where can you learn more? Go to http://cristina-mily.info/. Enter F129 in the search box to learn more about \"Rhabdomyolysis: Care Instructions. \"  Current as of: May 12, 2017  Content Version: 11.4  © 6694-7096 Granular. Care instructions adapted under license by [employer] (which disclaims liability or warranty for this information). If you have questions about a medical condition or this instruction, always ask your healthcare professional. [place] [address] any warranty or liability for your use of this information.

## 2018-01-02 NOTE — MR AVS SNAPSHOT
Visit Information Date & Time Provider Department Dept. Phone Encounter #  
 1/2/2018  2:30 PM Teena Malloy MD [address] [facility] [phone] [id number] Follow-up Instructions Return in about 4 months (around 5/2/2018) for Follow-up. Your Appointments 1/23/2018  3:30 PM  
New Patient with MD Kranthi Martinez 75 ([facility]) Appt Note: Np, Fatty Liver, johnathon 10.16.17  
 200 [facility] [place] [phone] [facility] [address]  
[phone]  
  
   
 200 [facility] [address] [place] [id number]  
  
    
 5/31/2018  9:00 AM  
PHYSICAL with Teena Malloy MD  
[address] Internal Medicine [facility]) Appt Note: [address] [facility] 2500 [facility] [address]  
[facility] [facility] Upcoming Health Maintenance Date Due  
 PAP AKA CERVICAL CYTOLOGY 6/14/2018 DTaP/Tdap/Td series (2 - Td) 1/1/2024 Allergies as of 1/2/2018  Review Complete On: 1/2/2018 By: Teena Malloy MD  
  
 Severity Noted Reaction Type Reactions Sulfa (Sulfonamide Antibiotics)  01/26/2017    Rash Current Immunizations  Reviewed on 5/9/2017 Name Date Influenza Vaccine (Quad) PF 9/29/2017 Not reviewed this visit You Were Diagnosed With   
  
 Codes Comments Elevated CPK    -  Primary ICD-10-CM: R74.8 ICD-9-CM: 790.5 Mixed hyperlipidemia     ICD-10-CM: E78.2 ICD-9-CM: 272.2 Decreased GFR     ICD-10-CM: R94.4 ICD-9-CM: 794.4 Familial hypercholesteremia     ICD-10-CM: E78.01 
ICD-9-CM: 272.0 Vitals BP Pulse Temp Resp Height(growth percentile) Weight(growth percentile) 109/66 (BP 1 Location: Right arm, BP Patient Position: Sitting) 71 98 °F (36.7 °C) (Oral) 14 5' 3.35\" (1.609 m) 128 lb 9.6 oz (58.3 kg) SpO2 BMI OB Status Smoking Status 100% 22.53 kg/m2 Medically Induced Never Smoker Vitals History BMI and BSA Data Body Mass Index Body Surface Area  
 22.53 kg/m 2 1.61 m 2 Preferred Pharmacy Pharmacy Name Phone [facility][id number] Reyes [phone] Your Updated Medication List  
  
   
This list is accurate as of: 1/2/18  3:18 PM.  Always use your most recent med list.  
  
  
  
  
 atorvastatin 40 mg tablet Commonly known as:  LIPITOR Week One:  take half tablet by mouth nightly. Week 2  And beyond: Take 1 tab by mouth nightly  
  
 klack's mixture Solution Take  by mouth every six (6) hours. Diphenhydramine elixir 12.5mg/ml 500ml, Nystatin suspension 120ml,Tetracycline 500mg capsules  12 capsules (6g), Hydrocortisone 20mg tablet 12 tablets (240mg), Water for irrigation QS ad 1000ml  
  
 mometasone 0.1 % topical cream  
Commonly known as:  Armando Dung Apply  to affected area two (2) times a day. * NORETH-ETHINYL ESTRADIOL-IRON PO Take  by mouth. * KAITLIB FE 0.8mg-25mcg(24) and 75 mg (4) Chew Generic drug:  noreth-ethinyl estradiol-iron TAKE 1 TABLET BY MOUTH EVERY DAY  
  
 omega-3 acid ethyl esters 1 gram capsule Commonly known as:  Hayley Junk Take 2 Caps by mouth two (2) times a day. * Notice: This list has 2 medication(s) that are the same as other medications prescribed for you. Read the directions carefully, and ask your doctor or other care provider to review them with you. We Performed the Following AMB POC URINALYSIS DIP STICK AUTO W/O MICRO [78832 CPT(R)] CK [id number] CPT(R)] METABOLIC PANEL, COMPREHENSIVE [64482 CPT(R)] REFERRAL TO CARDIOLOGY [ZYG71 Custom] Follow-up Instructions Return in about 4 months (around 5/2/2018) for Follow-up. Referral Information Referral ID Referred By Referred To  
  
 [id number] Nancy Arriola, MD [address] [facility], [address] [place] [address] Avenue Phone: [phone] Fax: [phone] Visits Status Start Date End Date 1 New Request 1/2/18 1/2/19 If your referral has a status of pending review or denied, additional information will be sent to support the outcome of this decision. Patient Instructions It was a pleasure to see you! As discussed: 
High Cholesterol Schedule with cardiology Schedule with nutritionist  
Heidy Fernandez Nutrition [phone] Depression Please confirm the medication you were taking and I will order it provided your liver tests are normal.  
 
Rhabdomyolysis: Care Instructions Your Care Instructions When you have rhabdomyolysis (say \"bmv-rbg-za-AH-miguel-suss\"), dying muscle cells cause toxins to build up in the blood. If not treated, it can cause life-threatening damage to the body's organs. It can be caused by many things, such as severe muscle injury, some medicines (like statins), the flu, and certain blood infections. Symptoms may include weak muscles, pain, stiffness, fever, and nausea. Your urine may also be dark. You will get treatment in the hospital. If possible, the doctor will stop the cause of muscle cell death. The doctor will take steps to protect your organs. You may have to stop taking certain medicines if they are the cause of the problem. You will also get treatment to help the kidneys remove the toxins from your blood. This includes plenty of fluids. You may get fluids through a vein (by IV). You may also need dialysis. Follow-up care is a key part of your treatment and safety. Be sure to make and go to all appointments, and call your doctor if you are having problems. It's also a good idea to know your test results and keep a list of the medicines you take. How can you care for yourself at home? · Take pain medicines exactly as directed. ¨ If the doctor gave you a prescription medicine for pain, take it as prescribed.  
¨ If you are not taking a prescription pain medicine, ask your doctor if you can take an over-the-counter medicine. · Talk to your doctor about whether you need to stop taking any medicines. Follow your doctor's instructions about stopping medicines. · Drink plenty of fluids, enough so that your urine is light yellow or clear like water. If you have kidney, heart, or liver disease and have to limit fluids, talk with your doctor before you increase the amount of fluids you drink. When should you call for help? Call your doctor now or seek immediate medical care if: 
? · You have new or worse muscle pain. ? · You have less urine than normal or no urine. ? · You have new swelling in your arms or feet. ? · You have blood in your urine. ? Watch closely for changes in your health, and be sure to contact your doctor if you do not get better as expected. Where can you learn more? Go to http://cristina-mily.info/. Enter F129 in the search box to learn more about \"Rhabdomyolysis: Care Instructions. \" Current as of: May 12, 2017 Content Version: 11.4 © 0681-3644 Fugate.cl. Care instructions adapted under license by [employer] (which disclaims liability or warranty for this information). If you have questions about a medical condition or this instruction, always ask your healthcare professional. [address] any warranty or liability for your use of this information. Introducing [facility] & HEALTH SERVICES! Dear Salma Justice: Thank you for requesting a [employer] account. Our records indicate that you already have an active [employer] account. You can access your account anytime at [email or web address]. [email or web address]/GMI Ratings Did you know that you can access your hospital and ER discharge instructions at any time in [employer]? You can also review all of your test results from your hospital stay or ER visit. Additional Information If you have questions, please visit the Frequently Asked Questions section of the [employer] website at [email or web address] Remember, [employer] is NOT to be used for urgent needs. For medical emergencies, dial 911. Now available from your iPhone and Android! Please provide this summary of care documentation to your next provider. Your primary care clinician is listed as Jonna Crenshaw. If you have any questions after today's visit, please call [phone].

## 2018-01-03 ENCOUNTER — TELEPHONE (OUTPATIENT)
Dept: INTERNAL MEDICINE CLINIC | Age: 38
End: 2018-01-03

## 2018-01-03 ENCOUNTER — OFFICE VISIT (OUTPATIENT)
Dept: INTERNAL MEDICINE CLINIC | Age: 38
End: 2018-01-03

## 2018-01-03 DIAGNOSIS — R94.4 DECREASED GFR: ICD-10-CM

## 2018-01-03 DIAGNOSIS — M62.82 RHABDOMYOLYSIS DUE TO STATIN THERAPY: Primary | ICD-10-CM

## 2018-01-03 DIAGNOSIS — T46.6X5A RHABDOMYOLYSIS DUE TO STATIN THERAPY: Primary | ICD-10-CM

## 2018-01-03 NOTE — PATIENT INSTRUCTIONS
It was a pleasure to see you again! Drink at least (4) 32 oz sports drinks before your lab draw 1/4/18 at 12pm.  Goal light colored urine and urinate every 2hrs. See below for more information. Rhabdomyolysis: Care Instructions  Your Care Instructions    When you have rhabdomyolysis (say \"tre-lnx-dd-AH-miguel-troy\"), dying muscle cells cause toxins to build up in the blood. If not treated, it can cause life-threatening damage to the body's organs. It can be caused by many things, such as severe muscle injury, some medicines (like statins), the flu, and certain blood infections. Symptoms may include weak muscles, pain, stiffness, fever, and nausea. Your urine may also be dark. You will get treatment in the hospital. If possible, the doctor will stop the cause of muscle cell death. The doctor will take steps to protect your organs. You may have to stop taking certain medicines if they are the cause of the problem. You will also get treatment to help the kidneys remove the toxins from your blood. This includes plenty of fluids. You may get fluids through a vein (by IV). You may also need dialysis. Follow-up care is a key part of your treatment and safety. Be sure to make and go to all appointments, and call your doctor if you are having problems. It's also a good idea to know your test results and keep a list of the medicines you take. How can you care for yourself at home? · Take pain medicines exactly as directed. ¨ If the doctor gave you a prescription medicine for pain, take it as prescribed. ¨ If you are not taking a prescription pain medicine, ask your doctor if you can take an over-the-counter medicine. · Talk to your doctor about whether you need to stop taking any medicines. Follow your doctor's instructions about stopping medicines. · Drink plenty of fluids, enough so that your urine is light yellow or clear like water.  If you have kidney, heart, or liver disease and have to limit fluids, talk with your doctor before you increase the amount of fluids you drink. When should you call for help? Call your doctor now or seek immediate medical care if:  ? · You have new or worse muscle pain. ? · You have less urine than normal or no urine. ? · You have new swelling in your arms or feet. ? · You have blood in your urine. · You stop urinating    ? Watch closely for changes in your health, and be sure to contact your doctor if you do not get better as expected. Where can you learn more? Go to http://cristina-mily.info/. Enter F129 in the search box to learn more about \"Rhabdomyolysis: Care Instructions. \"  Current as of: May 12, 2017  Content Version: 11.4  © 7027-4999 Healthwise, Incorporated. Care instructions adapted under license by [employer] (which disclaims liability or warranty for this information). If you have questions about a medical condition or this instruction, always ask your healthcare professional. [place] [address] any warranty or liability for your use of this information.

## 2018-01-03 NOTE — TELEPHONE ENCOUNTER
----- Message from Jaime Florez MD sent at 1/3/2018  9:52 AM EST -----  Tootie please call Afsaneh Broussard and let her know she should come in to the office for 1L of NS. We will repeat labs after the fluids.    ---

## 2018-01-03 NOTE — PROGRESS NOTES
Tootie please call Corbin Sy and let her know she should come in to the office for 1L of NS. We will repeat labs after the fluids.    ---

## 2018-01-03 NOTE — MR AVS SNAPSHOT
Visit Information Date & Time Provider Department Dept. Phone Encounter #  
 1/3/2018 11:30 AM Robert Sanchez MD [address] Internal Medicine [phone] [id number] Your Appointments 1/23/2018  8:40 AM  
New Patient with Tere Solis MD  
[facility] ([facility]) Appt Note: Dr Leeanna Howard E78.01  
 [address] [facility] [address] [place]  
[address] [facility] [address]  
  
    
 1/23/2018  3:30 PM  
New Patient with Jessie Parnell MD  
[address] ([facility]) Appt Note: Np, Fatty Liver, johnathon 10.16.17  
 [facility] [place] [phone] [facility] [address]  
[phone]  
  
   
 [facility] [place] [address] [facility] [address]  
  
    
 5/3/2018  9:30 AM  
ROUTINE CARE with Robert Sanchez MD  
[address] Internal Medicine [facility]) Appt Note: f/u [address] [facility] [address]  
[address] [place]  
  
    
 5/31/2018  9:00 AM  
PHYSICAL with Robert Sanchez MD  
[address] Internal Medicine [facility]) Appt Note: [address] [facility] [address] [facility] [address]  
[address] [address] [address] [place] Upcoming Health Maintenance Date Due  
 PAP AKA CERVICAL CYTOLOGY 6/14/2018 DTaP/Tdap/Td series (2 - Td) 1/1/2024 Allergies as of 1/3/2018  Review Complete On: 1/2/2018 By: Robert Sanchez MD  
  
 Severity Noted Reaction Type Reactions Sulfa (Sulfonamide Antibiotics)  01/26/2017    Rash Current Immunizations  Reviewed on 5/9/2017 Name Date Influenza Vaccine (Quad) PF 9/29/2017 Not reviewed this visit You Were Diagnosed With   
  
 Codes Comments Rhabdomyolysis due to statin therapy    -  Primary ICD-10-CM: M71.24 ICD-9-CM: 728.88 Decreased GFR     ICD-10-CM: R94.4 ICD-9-CM: 794.4 Vitals OB Status Smoking Status Medically Induced Never Smoker Preferred Pharmacy Pharmacy Name Phone [facility]ACY [facility][id number] Booker [phone] Your Updated Medication List  
  
   
This list is accurate as of: 1/3/18 12:35 PM.  Always use your most recent med list.  
  
  
  
  
 atorvastatin 40 mg tablet Commonly known as:  LIPITOR Week One:  take half tablet by mouth nightly. Week 2  And beyond: Take 1 tab by mouth nightly  
  
 klack's mixture Solution Take  by mouth every six (6) hours. Diphenhydramine elixir 12.5mg/ml 500ml, Nystatin suspension 120ml,Tetracycline 500mg capsules  12 capsules (6g), Hydrocortisone 20mg tablet 12 tablets (240mg), Water for irrigation QS ad 1000ml  
  
 mometasone 0.1 % topical cream  
Commonly known as:  Cathryne Gola Apply  to affected area two (2) times a day. * NORETH-ETHINYL ESTRADIOL-IRON PO Take  by mouth. * KAITLIB FE 0.8mg-25mcg(24) and 75 mg (4) Chew Generic drug:  noreth-ethinyl estradiol-iron TAKE 1 TABLET BY MOUTH EVERY DAY  
  
 omega-3 acid ethyl esters 1 gram capsule Commonly known as:  Brigette Lower Take 2 Caps by mouth two (2) times a day. * Notice: This list has 2 medication(s) that are the same as other medications prescribed for you. Read the directions carefully, and ask your doctor or other care provider to review them with you. We Performed the Following CBC WITH AUTOMATED DIFF [78694 CPT(R)] CK [id number] CPT(R)] MAGNESIUM A851479 CPT(R)] METABOLIC PANEL, COMPREHENSIVE [73089 CPT(R)] Patient Instructions It was a pleasure to see you again! Drink at least (4) 32 oz sports drinks before your lab draw 1/4/18 at 12pm. 
Goal light colored urine and urinate every 2hrs. See below for more information. Rhabdomyolysis: Care Instructions Your Care Instructions When you have rhabdomyolysis (say \"btf-ptf-ko-AH-miguel-nobles\"), dying muscle cells cause toxins to build up in the blood. If not treated, it can cause life-threatening damage to the body's organs. It can be caused by many things, such as severe muscle injury, some medicines (like statins), the flu, and certain blood infections. Symptoms may include weak muscles, pain, stiffness, fever, and nausea. Your urine may also be dark. You will get treatment in the hospital. If possible, the doctor will stop the cause of muscle cell death. The doctor will take steps to protect your organs. You may have to stop taking certain medicines if they are the cause of the problem. You will also get treatment to help the kidneys remove the toxins from your blood. This includes plenty of fluids. You may get fluids through a vein (by IV). You may also need dialysis. Follow-up care is a key part of your treatment and safety. Be sure to make and go to all appointments, and call your doctor if you are having problems. It's also a good idea to know your test results and keep a list of the medicines you take. How can you care for yourself at home? · Take pain medicines exactly as directed. ¨ If the doctor gave you a prescription medicine for pain, take it as prescribed. ¨ If you are not taking a prescription pain medicine, ask your doctor if you can take an over-the-counter medicine. · Talk to your doctor about whether you need to stop taking any medicines. Follow your doctor's instructions about stopping medicines. · Drink plenty of fluids, enough so that your urine is light yellow or clear like water. If you have kidney, heart, or liver disease and have to limit fluids, talk with your doctor before you increase the amount of fluids you drink. When should you call for help? Call your doctor now or seek immediate medical care if: 
? · You have new or worse muscle pain. ? · You have less urine than normal or no urine. ? · You have new swelling in your arms or feet. ? · You have blood in your urine. · You stop urinating ? Watch closely for changes in your health, and be sure to contact your doctor if you do not get better as expected. Where can you learn more? Go to http://cristina-mily.info/. Enter F129 in the search box to learn more about \"Rhabdomyolysis: Care Instructions. \" Current as of: May 12, 2017 Content Version: 11.4 © 0670-2384 AMT. Care instructions adapted under license by [employer] (which disclaims liability or warranty for this information). If you have questions about a medical condition or this instruction, always ask your healthcare professional. [address] any warranty or liability for your use of this information. Introducing [facility] & HEALTH SERVICES! Dear Ramin Franks: Thank you for requesting a [employer] account. Our records indicate that you already have an active [employer] account. You can access your account anytime at [email or web address] Did you know that you can access your hospital and ER discharge instructions at any time in [employer]? You can also review all of your test results from your hospital stay or ER visit. Additional Information If you have questions, please visit the Frequently Asked Questions section of the [employer] website at [email or web address] Remember, [employer] is NOT to be used for urgent needs. For medical emergencies, dial 911. Now available from your iPhone and Android! Please provide this summary of care documentation to your next provider. Your primary care clinician is listed as Megan Dumont. If you have any questions after today's visit, please call [phone].

## 2018-01-04 LAB
ALBUMIN SERPL-MCNC: 4.4 G/DL (ref 3.5–5.5)
ALBUMIN/GLOB SERPL: 1.6 {RATIO} (ref 1.2–2.2)
ALP SERPL-CCNC: 54 IU/L (ref 39–117)
ALT SERPL-CCNC: 30 IU/L (ref 0–32)
AST SERPL-CCNC: 38 IU/L (ref 0–40)
BASOPHILS # BLD AUTO: 0.1 X10E3/UL (ref 0–0.2)
BASOPHILS NFR BLD AUTO: 1 %
BILIRUB SERPL-MCNC: 1.4 MG/DL (ref 0–1.2)
BUN SERPL-MCNC: 13 MG/DL (ref 6–20)
BUN/CREAT SERPL: 9 (ref 9–23)
CALCIUM SERPL-MCNC: 9.4 MG/DL (ref 8.7–10.2)
CHLORIDE SERPL-SCNC: 97 MMOL/L (ref 96–106)
CK SERPL-CCNC: 402 U/L (ref 24–173)
CO2 SERPL-SCNC: 28 MMOL/L (ref 18–29)
CREAT SERPL-MCNC: 1.4 MG/DL (ref 0.57–1)
EOSINOPHIL # BLD AUTO: 0.7 X10E3/UL (ref 0–0.4)
EOSINOPHIL NFR BLD AUTO: 7 %
ERYTHROCYTE [DISTWIDTH] IN BLOOD BY AUTOMATED COUNT: 17.5 % (ref 12.3–15.4)
GLOBULIN SER CALC-MCNC: 2.8 G/DL (ref 1.5–4.5)
GLUCOSE SERPL-MCNC: 69 MG/DL (ref 65–99)
HCT VFR BLD AUTO: 40.5 % (ref 34–46.6)
HGB BLD-MCNC: 14.2 G/DL (ref 11.1–15.9)
LYMPHOCYTES # BLD AUTO: 3.7 X10E3/UL (ref 0.7–3.1)
LYMPHOCYTES NFR BLD AUTO: 41 %
MAGNESIUM SERPL-MCNC: 2 MG/DL (ref 1.6–2.3)
MCH RBC QN AUTO: 31.2 PG (ref 26.6–33)
MCHC RBC AUTO-ENTMCNC: 35.1 G/DL (ref 31.5–35.7)
MCV RBC AUTO: 89 FL (ref 79–97)
MONOCYTES # BLD AUTO: 0.5 X10E3/UL (ref 0.1–0.9)
MONOCYTES NFR BLD AUTO: 5 %
MORPHOLOGY BLD-IMP: ABNORMAL
NEUTROPHILS # BLD AUTO: 4.2 X10E3/UL (ref 1.4–7)
NEUTROPHILS NFR BLD AUTO: 46 %
PLATELET # BLD AUTO: 286 X10E3/UL (ref 150–379)
POTASSIUM SERPL-SCNC: 4 MMOL/L (ref 3.5–5.2)
PROT SERPL-MCNC: 7.2 G/DL (ref 6–8.5)
RBC # BLD AUTO: 4.55 X10E6/UL (ref 3.77–5.28)
SODIUM SERPL-SCNC: 136 MMOL/L (ref 134–144)
WBC # BLD AUTO: 9.2 X10E3/UL (ref 3.4–10.8)

## 2018-01-04 NOTE — PROGRESS NOTES
Leo Ravi eyes to come into the office today for 1 L of normal saline infusion due to her pain and mild AK I from statin-induced rhabdomyolysis. Unfortunately  there is a national shortage of normal saline and is still back in the office was under recall. Fortunately her levels are stable enough so that she can be managed as an outpatient with oral hydration. I had a lengthy discussion with both patient and her boyfriend who was present today and is assisting in her care about the signs and symptoms to monitor for. She was advised to drink at least 64 ounces of electrolyte solution today along with water and with the goal of having urination every 2 hours and to call the office if her urination had decreased by greater than 4 hours. She was advised of the ER precautions. The questions from both her and her boyfriend were answered. Labs were ordered to be completed on January 4 at noon.

## 2018-01-05 ENCOUNTER — TELEPHONE (OUTPATIENT)
Dept: INTERNAL MEDICINE CLINIC | Age: 38
End: 2018-01-05

## 2018-01-05 DIAGNOSIS — T46.6X5A STATIN-INDUCED RHABDOMYOLYSIS: Primary | ICD-10-CM

## 2018-01-05 DIAGNOSIS — M62.82 STATIN-INDUCED RHABDOMYOLYSIS: Primary | ICD-10-CM

## 2018-01-05 LAB — H PYLORI AG STL QL IA: NEGATIVE

## 2018-01-05 NOTE — PROGRESS NOTES
Michael Jiang. Priscilla Schultz. It was a pleasure to speak to you today. As discussed your liver function and muscle breakdown products have significantly improved. Your kidney function is still improving. Continue to drink increased fluids as we discussed. We will recheck your kidney function on January 10. A lab slip will be left for you at the . I am glad that he had a chance to see Doreen Mendez. we will check your small particle lipids once your acute illness is resolved. I have asked that you stay out of work until January 11. Do not hesitate to contact the office if you have any questions or concerns before your next appointment.    Kind regards,   Dr. Corky Goldberg

## 2018-01-11 LAB
ALBUMIN SERPL-MCNC: 4.6 G/DL (ref 3.5–5.5)
ALBUMIN/GLOB SERPL: 1.5 {RATIO} (ref 1.2–2.2)
ALP SERPL-CCNC: 52 IU/L (ref 39–117)
ALT SERPL-CCNC: 34 IU/L (ref 0–32)
AST SERPL-CCNC: 41 IU/L (ref 0–40)
BILIRUB SERPL-MCNC: 1.3 MG/DL (ref 0–1.2)
BUN SERPL-MCNC: 15 MG/DL (ref 6–20)
BUN/CREAT SERPL: 14 (ref 9–23)
CALCIUM SERPL-MCNC: 9.6 MG/DL (ref 8.7–10.2)
CHLORIDE SERPL-SCNC: 96 MMOL/L (ref 96–106)
CK SERPL-CCNC: 389 U/L (ref 24–173)
CO2 SERPL-SCNC: 26 MMOL/L (ref 18–29)
CREAT SERPL-MCNC: 1.08 MG/DL (ref 0.57–1)
GLOBULIN SER CALC-MCNC: 3.1 G/DL (ref 1.5–4.5)
GLUCOSE SERPL-MCNC: 83 MG/DL (ref 65–99)
POTASSIUM SERPL-SCNC: 3.9 MMOL/L (ref 3.5–5.2)
PROT SERPL-MCNC: 7.7 G/DL (ref 6–8.5)
SODIUM SERPL-SCNC: 139 MMOL/L (ref 134–144)

## 2018-01-23 ENCOUNTER — OFFICE VISIT (OUTPATIENT)
Dept: HEMATOLOGY | Age: 38
End: 2018-01-23

## 2018-01-23 ENCOUNTER — TELEPHONE (OUTPATIENT)
Dept: CARDIOLOGY CLINIC | Age: 38
End: 2018-01-23

## 2018-01-23 ENCOUNTER — OFFICE VISIT (OUTPATIENT)
Dept: CARDIOLOGY CLINIC | Age: 38
End: 2018-01-23

## 2018-01-23 VITALS
HEART RATE: 66 BPM | TEMPERATURE: 97.8 F | OXYGEN SATURATION: 98 % | HEIGHT: 63 IN | DIASTOLIC BLOOD PRESSURE: 59 MMHG | WEIGHT: 132 LBS | BODY MASS INDEX: 23.39 KG/M2 | SYSTOLIC BLOOD PRESSURE: 93 MMHG

## 2018-01-23 VITALS
BODY MASS INDEX: 23.13 KG/M2 | HEART RATE: 75 BPM | WEIGHT: 132 LBS | DIASTOLIC BLOOD PRESSURE: 56 MMHG | SYSTOLIC BLOOD PRESSURE: 108 MMHG

## 2018-01-23 DIAGNOSIS — E55.9 VITAMIN D INSUFFICIENCY: ICD-10-CM

## 2018-01-23 DIAGNOSIS — E78.2 MIXED HYPERLIPIDEMIA: ICD-10-CM

## 2018-01-23 DIAGNOSIS — R52 BODY ACHES: ICD-10-CM

## 2018-01-23 DIAGNOSIS — E78.2 MIXED HYPERLIPIDEMIA: Primary | ICD-10-CM

## 2018-01-23 DIAGNOSIS — E78.01 FAMILIAL HYPERCHOLESTEREMIA: Primary | ICD-10-CM

## 2018-01-23 DIAGNOSIS — T46.6X5A RHABDOMYOLYSIS DUE TO STATIN THERAPY: ICD-10-CM

## 2018-01-23 DIAGNOSIS — R74.8 ELEVATED LIVER ENZYMES: Primary | ICD-10-CM

## 2018-01-23 DIAGNOSIS — M62.82 RHABDOMYOLYSIS DUE TO STATIN THERAPY: ICD-10-CM

## 2018-01-23 DIAGNOSIS — R07.9 CHEST PAIN, UNSPECIFIED TYPE: ICD-10-CM

## 2018-01-23 RX ORDER — MELATONIN
DAILY
COMMUNITY

## 2018-01-23 NOTE — MR AVS SNAPSHOT
[address] [facility] [address] [facility] 
[phone] Patient: Octavio Mart MRN: [id number]:[id number] Visit Information Date & Time Provider Department Dept. Phone Encounter #  
 1/23/2018  8:40 AM Perla Verduzco MD [facility] Alex Gamez [phone] [id number] Your Appointments 1/23/2018 12:30 PM  
New Patient with Jerome Gabriel MD  
[address] ([facility]) Appt Note: Np, Fatty Liver, cp$30 cc johnathon 10.16.17  
 200 [facility] Yoandy [phone] [facility] [address]  
[phone]  
  
   
 [address] [address]  
  
    
 5/3/2018  9:30 AM  
ROUTINE CARE with Kim Bruno MD  
[facility] Internal Medicine [facility]) Appt Note: f/u [address] [facility] [address]  
[address] 1000 [facility]  
  
    
 5/31/2018  9:00 AM  
PHYSICAL with Kim Bruno MD  
[facility]) Appt Note: [address] [facility] [address] [facility] [address]  
[address] [address] [facility] [address] Upcoming Health Maintenance Date Due  
 PAP AKA CERVICAL CYTOLOGY 6/14/2018 DTaP/Tdap/Td series (2 - Td) 1/1/2024 Allergies as of 1/23/2018  Review Complete On: 1/23/2018 By: Gucci Vaughan Severity Noted Reaction Type Reactions Atorvastatin  01/23/2018    Other (comments) Sulfa (Sulfonamide Antibiotics)  01/26/2017    Rash Current Immunizations  Reviewed on 5/9/2017 Name Date Influenza Vaccine (Quad) PF 9/29/2017 Not reviewed this visit You Were Diagnosed With   
  
 Codes Comments Mixed hyperlipidemia    -  Primary ICD-10-CM: S42.0 ICD-9-CM: 272.2 Vitamin D insufficiency     ICD-10-CM: E55.9 ICD-9-CM: 268.9 Vitals BP Pulse Weight(growth percentile) BMI OB Status Smoking Status 108/56 (BP 1 Location: Left arm, BP Patient Position: Sitting) 75 132 lb (59.9 kg) 23.13 kg/m2 Medically Induced Never Smoker Vitals History BMI and BSA Data Body Mass Index Body Surface Area  
 23.13 kg/m 2 1.64 m 2 Preferred Pharmacy Pharmacy Name Phone [id number] [phone] Your Updated Medication List  
  
   
This list is accurate as of: 1/23/18 10:09 AM.  Always use your most recent med list. KAITLIB FE 0.8mg-25mcg(24) and 75 mg (4) Chew Generic drug:  noreth-ethinyl estradiol-iron TAKE 1 TABLET BY MOUTH EVERY DAY  
  
 VITAMIN D3 1,000 unit tablet Generic drug:  cholecalciferol Take  by mouth daily. We Performed the Following AMB POC EKG ROUTINE W/ 12 LEADS, INTER & REP [20593 CPT(R)] Introducing [facility] SERVICES! Dear Shayy Kaufman: Thank you for requesting a Highlight account. Our records indicate that you already have an active Highlight account. You can access your account anytime at [email or web address] Video Did you know that you can access your hospital and ER discharge instructions at any time in Highlight? You can also review all of your test results from your hospital stay or ER visit. Additional Information If you have questions, please visit the Frequently Asked Questions section of the Highlight website at [email or web address] Remember, Highlight is NOT to be used for urgent needs. For medical emergencies, dial 911. Now available from your iPhone and Android! Please provide this summary of care documentation to your next provider. Your primary care clinician is listed as Nanci Butler. If you have any questions after today's visit, please call [phone].

## 2018-01-23 NOTE — PROGRESS NOTES
70 Zachary Chaudhry MD, [address], [facility], [place]       CELI Ramires PA-C Solon Quin, [facility]   Anika Patel, CELI Foss [facility] [address]    at [address], [address] Roberto Burrows  22.    [phone]    FAX: [address], [address], [address]    [phone]    FAX: [phone]         Patient Care Team:  Kris Gaffney MD as PCP - General (Internal Medicine)      Problem List  Date Reviewed: 1/23/2018          Codes Class Noted    Elevated liver enzymes ICD-10-CM: R74.8  ICD-9-CM: 790.5  1/23/2018        Mixed hyperlipidemia ICD-10-CM: E78.2  ICD-9-CM: 272.2  5/26/2017        Vitamin D insufficiency ICD-10-CM: E55.9  ICD-9-CM: 268.9  5/26/2017        Seasonal affective disorder ([facility] [address] ICD-10-CM: F33.9  ICD-9-CM: 296.99  1/26/2017        Depression, major, in remission [facility]) ICD-10-CM: F32.5  ICD-9-CM: 296.25  1/26/2017        Family history of renal cancer ICD-10-CM: Z80.51  ICD-9-CM: V16.51  1/26/2017                The physicians listed above have asked me to see Argenis Tim in consultation regarding elevated liver enzymes and its management. All medical records sent by the referring physicians were reviewed including imaging studies     The patient is a 40 y.o.  female who was first noted to have abnormalities in liver transaminases in 10/2017. Serologic evaluation for markers of chronic liver disease were either not performed or available to me. Ultrasound of the liver was performed in 5/2017. The results of the imaging suggested fatty liver disease. An assessment of liver fibrosis with biopsy or elastography has not been performed. The patient was started on a statin in 2017 and developed muslce pain and an elevation in CK. The statin was stopped. The most recent laboratory studies indicate that the liver transaminases are elevated, ALP is normal, tests of hepatic synthetic and metabolic function are   normal, and the platelet count is Normal.    The patient notes fatigue,     The patient has limitations in functional activities secondary to these symptoms. The patient has not experienced pain in the right side over the liver,       ALLERGIES  Allergies   Allergen Reactions    Atorvastatin Other (comments)    Sulfa (Sulfonamide Antibiotics) Rash       MEDICATIONS  Current Outpatient Prescriptions   Medication Sig    cholecalciferol (VITAMIN D3) 1,000 unit tablet Take  by mouth daily.  KAITLIB FE 0.8mg-25mcg(24) and 75 mg (4) chew TAKE 1 TABLET BY MOUTH EVERY DAY    evolocumab (REPATHA SURECLICK) pen injection 1 mL by SubCUTAneous route every fourteen (14) days. No current facility-administered medications for this visit. SYSTEM REVIEW NOT RELATED TO LIVER DISEASE OR REVIEWED ABOVE:  Constitution systems: Negative for fever, chills, weight gain, weight loss. Eyes: Negative for visual changes. ENT: Negative for sore throat, painful swallowing. Respiratory: Negative for cough, hemoptysis, SOB. Cardiology: Negative for chest pain, palpitations. GI:  Negative for constipation or diarrhea. : Negative for urinary frequency, dysuria, hematuria, nocturia. Skin: Negative for rash. Hematology: Negative for easy bruising, blood clots. Musculo-skelatal: Negative for back pain, muscle pain, weakness. Neurologic: Negative for headaches, dizziness, vertigo, memory problems not related to HE. Psychology: Negative for anxiety, depression. FAMILY HISTORY:  The father is alive and healthy. has the following chronic diseases: CHOL. The mother  of kidney cancer.     There is no family history of liver disease. SOCIAL HISTORY:  The patient has never been . The patient has no children. The patient has never used tobacco products. The patient has never consumed significant amounts of alcohol. The patient currently works full time . PHYSICAL EXAMINATION:  Visit Vitals    BP 93/59 (BP 1 Location: Left arm, BP Patient Position: Sitting)    Pulse 66    Temp 97.8 °F (36.6 °C) (Tympanic)    Ht 5' 3\" (1.6 m)    Wt 132 lb (59.9 kg)    SpO2 98%    BMI 23.38 kg/m2     General: No acute distress. Eyes: Sclera anicteric. ENT: No oral lesions. Thyroid normal.  Nodes: No adenopathy. Skin: No spider angiomata. No jaundice. No palmar erythema. Respiratory: Lungs clear to auscultation. Cardiovascular: Regular heart rate. No murmurs. No JVD. Abdomen: Soft non-tender. Liver size normal to percussion/palpation. Spleen not palpable. No obvious ascites. Extremities: No edema. No muscle wasting. No gross arthritic changes. Neurologic: Alert and oriented. Cranial nerves grossly intact. No asterixis. LABORATORY STUDIES:  Liver [place] of [address] [place] [address] 1/10/2018 1/4/2018   WBC 3.4 - 10.8 x10E3/uL  9.2   ANC 1.4 - 7.0 x10E3/uL  4.2   HGB 11.1 - 15.9 g/dL  14.2    - 379 x10E3/uL  286   AST 0 - 40 IU/L 41 (H) 38   ALT 0 - 32 IU/L 34 (H) 30   Alk Phos 39 - 117 IU/L 52 54   Bili, Total 0.0 - 1.2 mg/dL 1.3 (H) 1.4 (H)   Bili, Direct 0.00 - 0.40 mg/dL     Albumin 3.5 - 5.5 g/dL 4.6 4.4   BUN 6 - 20 mg/dL 15 13   Creat 0.57 - 1.00 mg/dL 1.08 (H) 1.40 (H)   Na 134 - 144 mmol/L 139 136   K 3.5 - 5.2 mmol/L 3.9 4.0   Cl 96 - 106 mmol/L 96 97   CO2 18 - 29 mmol/L 26 28   Glucose 65 - 99 mg/dL 83 69   Magnesium 1.6 - 2.3 mg/dL  2.0     SEROLOGIES:  Not available or performed. Testing was performed today. LIVER HISTOLOGY:  Not available or performed    ENDOSCOPIC PROCEDURES:  Not available or performed    RADIOLOGY:  10/2017.   Ultrasound of liver.  Echogenic consistent with fatty liver. No liver mass lesions. No dilated bile ducts. No ascites. OTHER TESTING:  Not available or performed    ASSESSMENT AND PLAN:  Intermitant elevation in liver transaminases of unclear etiology at this time. Liver function is normal.  The platelet count is normal.      Based upon laboratory studies and imaging the patient does not appear to have advanced liver injury. Serologic testing to help define the cause of the laboratory abnormality were ordered. The most likely causes for the liver chemistry abnormalities were discussed with the patient and include immune liver disorders,     Will perform laboratory testing to monitor liver function and degree of liver injury. This included BMP, hepatic panel, CBC with platelet count, INR. The need to perform a liver biopsy to help determine the cause and severity of the liver test abnormalities was discussed. The risks of performing the liver biopsy including pain, puncture of the lung, gallbladder, intestine or kidney and bleeding were discussed. Will defer liver biopsy for now. The need to perform an assessment of liver fibrosis was discussed with the patient. The Fibroscan can assess liver fibrosis and determine if a patient has advanced fibrosis or cirrhosis without the need for liver biopsy. The Fibroscan is currently available at [facility]. This will be performed at the next office visit. If the Fibroscan suggests advanced fibrosis then a liver biopsy should be considered. The patient was directed to continue all current medications at the current dosages. There are no contraindications for the patient to take any medications that are necessary for treatment of other medical issues. The patient was counseled regarding alcohol consumption. The need for vaccination against viral hepatitis A and B will be assessed with serologic and instituted as appropriate.     All of the above issues were discussed with the patient. All questions were answered. The patient expressed a clear understanding of the above. [address] [address] in 4 weeks for Fibroscan, to review all data and determine the treatment plan.     Chirag Palomino MD  [facility] [place] of [address] [place] [address], [facility] 22.  [phone]

## 2018-01-23 NOTE — LETTER
2/22/2018 9:37 AM 
 
Ms[address] [facility] [address] Dear Basilio Beavers: 
 
Please find your most recent results below. Resulted Orders METABOLIC PANEL, COMPREHENSIVE Result Value Ref Range Glucose 107 (H) 65 - 99 mg/dL BUN 19 6 - 20 mg/dL Creatinine 1.43 (H) 0.57 - 1.00 mg/dL GFR est non-AA 47 (L) >59 GFR est AA 54 (L) >59 BUN/Creatinine ratio 13 9 - 23 Sodium 138 134 - 144 mmol/L Potassium 4.2 3.5 - 5.2 mmol/L Chloride 94 (L) 96 - 106 mmol/L  
 CO2 24 18 - 29 mmol/L Calcium 9.4 8.7 - 10.2 mg/dL Protein, total 7.5 6.0 - 8.5 g/dL Albumin 4.8 3.5 - 5.5 g/dL GLOBULIN, TOTAL 2.7 1.5 - 4.5 A-G Ratio 1.8 1.2 - 2.2 Bilirubin, total 2.2 (H) 0.0 - 1.2 mg/dL Alk. phosphatase 48 39 - 117 IU/L  
 AST (SGOT) 87 (H) 0 - 40 IU/L  
 ALT (SGPT) 65 (H) 0 - 32 IU/L Narrative Performed at:  [address]  [id number] : Quincy Kilpatrick MD, Phone:  [phone] CK Result Value Ref Range Creatine Kinase,Total 1604 (HH) 24 - 173 U/L Narrative Performed at:  [address]  [id number] : Quincy Kilpatrick MD, Phone:  [phone] MAGNESIUM Result Value Ref Range Magnesium 2.2 1.6 - 2.3 mg/dL Narrative Performed at:  [address]  [id number] : Quincy Kilpatrick MD, Phone:  [phone] VITAMIN D, 25 HYDROXY Result Value Ref Range VITAMIN D, 25-HYDROXY 32.9 30.0 - 100.0 ng/mL Comment:  
   Vitamin D deficiency has been defined by the [address] [place] [address] practice guideline as a 
level of serum 25-OH vitamin D less than 20 ng/mL (1,2). The Endocrine Society went on to further define vitamin D 
insufficiency as a level between 21 and 29 ng/mL (2). 1. IOM ([place] of Medicine). 2010. Dietary reference intakes for calcium and D. [facility]: The 
   KarmaKey. 2. Uriel MF, Franchesca NC, Adi-Junior RASHID, et al. 
   Evaluation, treatment, and prevention of vitamin D 
   deficiency: an Endocrine Society clinical practice 
   guideline. JCEM. 2011 Jul; 96(7):1911-30. Narrative Performed at:  [address]  [id number] : Birgit Nolasco MD, Phone:  [phone] CKD REPORT Result Value Ref Range Interpretation Note Comment:  
   Supplemental report is available. Narrative Performed at:  [address] [place] [address]  [id number] : Corey Webb PhD, Phone:  [phone] RECOMMENDATIONS: 
Your CK level is very elevated. Recommend you see Rheumatology, Dr. Ezequiel Lawton, [phone]. Please call to make an appointment. Please call me if you have any questions: [phone] Sincerely, Dameon Ramirez MD

## 2018-01-23 NOTE — TELEPHONE ENCOUNTER
Called patient, verified identity. Informed patient that Dr. Verona Langford wanted to order a CMP, Mg, Vitamin D level, and CK to be checked in about 1 month. Patient states she will stop by the office to  lab slip next month. Instructed her that they will be available at the .

## 2018-01-23 NOTE — MR AVS SNAPSHOT
2700 [facility] [phone] 1400 [address] [place] [address] 
[phone] Patient: Tereza Garcia MRN: [id number] BQW:77/92/[id number] Visit Information Date & Time Provider Department Dept. Phone Encounter #  
 1/23/2018 12:30 PM Lorenzo Vaughn. [address] [place] [address] [id number] Follow-up Instructions Return in about 4 weeks (around 2/20/2018) for MLS with FS. Your Appointments 2/28/2018 11:00 AM  
ESTABLISHED PATIENT with Iona Mike MD  
[facility] (36[address] [facility]) Appt Note: 6 wk f/u per Dr. Charity Roger [address] [place] [address]  2301 [facility] 100 [facility] [address]  
[address] 1000 [facility]  
  
    
 5/3/2018  9:30 AM  
ROUTINE CARE with Omid Lucio MD  
[facility] Internal Medicine [address] [place] [address]) Appt Note: f/u [facility] [address] [facility] [address]  
[address] 1000 [facility]  
  
    
 5/31/2018  9:00 AM  
PHYSICAL with Omid Lucio MD  
[facility] Internal Medicine [address] [place] [address]) Appt Note: [address] Formerly named [facility] [address] [facility] [address]  
[address] [address] [facility] [address] Upcoming Health Maintenance Date Due  
 PAP AKA CERVICAL CYTOLOGY 6/14/2018 DTaP/Tdap/Td series (2 - Td) 1/1/2024 Allergies as of 1/23/2018  Review Complete On: 1/23/2018 By: Stephen Khalil LPN Severity Noted Reaction Type Reactions Atorvastatin  01/23/2018    Other (comments) Sulfa (Sulfonamide Antibiotics)  01/26/2017    Rash Current Immunizations  Reviewed on 5/9/2017 Name Date Influenza Vaccine (Quad) PF 9/29/2017 Not reviewed this visit You Were Diagnosed With   
  
 Codes Comments Elevated liver enzymes    -  Primary ICD-10-CM: R74.8 ICD-9-CM: 790.5 Vitals BP Pulse Temp Height(growth percentile) Weight(growth percentile) SpO2  
 93/59 (BP 1 Location: Left arm, BP Patient Position: Sitting) 66 97.8 °F (36.6 °C) (Tympanic) 5' 3\" (1.6 m) 132 lb (59.9 kg) 98% BMI OB Status Smoking Status 23.38 kg/m2 Medically Induced Never Smoker BMI and BSA Data Body Mass Index Body Surface Area  
 23.38 kg/m 2 1.63 m 2 Preferred Pharmacy Pharmacy Name Phone [id number] [phone] Your Updated Medication List  
  
   
This list is accurate as of: 1/23/18  1:19 PM.  Always use your most recent med list.  
  
  
  
  
 evolocumab pen injection Commonly known as:  Cristi Model 1 mL by SubCUTAneous route every fourteen (14) days. KAITLIB FE 0.8mg-25mcg(24) and 75 mg (4) Chew Generic drug:  noreth-ethinyl estradiol-iron TAKE 1 TABLET BY MOUTH EVERY DAY  
  
 VITAMIN D3 1,000 unit tablet Generic drug:  cholecalciferol Take  by mouth daily. We Performed the Following ACTIN (SMOOTH MUSCLE) ANTIBODY H2457020 CPT(R)] ALPHA-1-ANTITRYPSIN, TOTAL [87979 CPT(R)] ANTINUCLEAR ANTIBODIES, IFA F6920645 CPT(R)] CBC WITH AUTOMATED DIFF [08783 CPT(R)] CERULOPLASMIN J156642 CPT(R)] FERRITIN [81173 CPT(R)] HCV AB W/REFLEX VERIFICATION [[id number] Custom] HEP A AB, TOTAL [id number] CPT(R)] HEP B SURFACE AB [id number] CPT(R)] HEP B SURFACE AG E4136383 CPT(R)] HEPATIC FUNCTION PANEL [99845 CPT(R)] HEPATITIS B CORE AB, TOTAL [id number] CPT(R)] IRON PROFILE W6808907 CPT(R)] METABOLIC PANEL, BASIC [32564 CPT(R)] Follow-up Instructions Return in about 4 weeks (around 2/20/2018) for MLS with FS. Patient Instructions FIBROSCAN PATIENT INFORMATION What is Fibroscan:? 
 
Fibroscan is an ultrasound device that measures liver stiffness by sending a pulse of vibrations through the liver.   This translated into an immediate result that can help your healthcare team determine the level of damage to the liver as well as monitor the condition of various liver diseases over time. Fibroscan is helpful in the evaluation of the following conditions: 
 
Chronic Hepatitis C Chronic Hepatitis B Fatty Liver Disease Alcohol Liver Disease Chronic Cholestatic Liver Diseases What happens During the Scan? Patients receiving this exam lie flat on an examination table and raise the right arm above the head. The skin over the right lower rib cage is exposed and the examiner locates the correct area to be scanned. The prove of the scanner is placed directly on the patient and triggered to start. This fells like a gentle flick against the skin and should not be uncomfortable. At least ten (10) readings are taken and the average is calculated to score the amount of liver stiffness or scar tissue. The exam should take 10-20 minutes. What do I need to do to prepare for the scan? Please do not eat or drink anything 2-4 hours  Before your Fibroscan. You should continue taking any prescribed medication and can take small sips of water or clear fluid to do so,  But avoid drinking large amounts of fluid. Please dress comfortably in clothes that will allow for easy access to the right side of the abdomen. Women are discouraged from wearing a dress on the day of the exam. 
 
Are there any special precautions? Patients who are pregnant or have an implantable device (for example, pacemaker or defibrillator) should not have this exam performed. Patients with a significant amount of fat tissue in the area the probe is pressed may be unable to have test performed. Introducing \[facility]\"" & HEALTH SERVICES! Dear Salma Justice: Thank you for requesting a Gummii account. Our records indicate that you already have an active Gummii account. You can access your account anytime at [email or web address] Did you know that you can access your hospital and ER discharge instructions at any time in aVinci Media? You can also review all of your test results from your hospital stay or ER visit. Additional Information If you have questions, please visit the Frequently Asked Questions section of the [employer] website at [email or web address] Remember, [employer] is NOT to be used for urgent needs. For medical emergencies, dial 911. Now available from your iPhone and Android! Please provide this summary of care documentation to your next provider. Your primary care clinician is listed as [facility]. If you have any questions after today's visit, please call [phone].

## 2018-01-23 NOTE — PROGRESS NOTES
Chief Complaint   Patient presents with    New Patient     Elevated liver enzymes. US on 10/17.      Visit Vitals    BP 93/59 (BP 1 Location: Left arm, BP Patient Position: Sitting)    Pulse 66    Temp 97.8 °F (36.6 °C) (Tympanic)    Ht 5' 3\" (1.6 m)    Wt 132 lb (59.9 kg)    SpO2 98%    BMI 23.38 kg/m2

## 2018-01-23 NOTE — PROGRESS NOTES
[facility] [place] [id number]    Reason for consult: dyslipidemia  Requesting physician: Dr. Dania Schaefer    HPI: Roberto Frankel, a 40y.o. year-old who presents as a new patient for evaluation of high cholesterol. She was started on atorvastatin 40 mg about 2 months ago for . She developed myalgias, increased fatigue and was found to have an elevated ck and LIBERTAD. She stopped atorvastatin on 12/29 and was treated with oral hydration for statin induced rhabdomyolysis. Most recent labs about 2 weeks ago show that her kidney function has normalized, CK is still elevated but trending down and her liver enzymes are still slightly elevated. She has an appointment with hepatology this afternoon. She was referred for further recommendations for treatment of hyperlipidemia. Since May of 2017 when she was told she had high cholesterol. She had been trying to eat better but relied frequently on packaged/frozen meals. Since this fall when her LDL spiked she has been following a mediterranean diet and her boyfriend is helping with cooking. She also has since met with a nutritionist for further education/advice. Prior to her recent illness she was very active at work and enjoyed the outdoors and rock climbing for exercise. She is curious if she still has to take fish oil, we discussed this is not absolutely necessary. She is also curious about particle size testing, we discussed that her LDL levels are significant enough that this testing would only be an added cost and would not change her treatment plan. She complains of occasional sharp, shooting chest pains. Tends to be associated with eating and laying flat. It sometimes will take her breathe away. She has been having some abdominal discomfort over the past few weeks.  Similar symptoms have occurred in the past during times of stress and she states that the past few weeks have been very stressful with her episode of rhabdomyolysis. She denies chest pressure, SOB, GABRIEL, dizziness, lightheaded, syncope, edema. Assessment/Plan:  1. FH, Familial Dyslipidemia:  in 12/2017. Intolerant of atorvastatin which resulted in rhabdomyolysis which is now resolving  -Discussed trialing a different statin however she is hesitant at this time. She is agreeable to starting Zetia 10mg daily   -It is unlikely that the addition of a statin with Zetia will get her LDL to goal, therefore we will start insurance approval for Repatha  -Encouraged low carb, mediterranean diet and low impact exercise  -Follow up in the office in 6 weeks    -[facility] [address] score of 6  2. Rhabdomyolysis: Statin induced, resolving, advised her to start coQ10 supplement for myalgias. Encouraged her to drink at least 2 Liters of fluid a day  -Encouraged to start low intensity exercise as tolerated. -Reassess CK, CMP, Mg at next visit. 3. Vitamin d deficiency: Vit D 29 in 1/2017 and now taking daily supplement, will reassess at next visit     Family History: significant family history of heart disease, father with dyslipidemia, paternal GF with MI, paternal GM with MI, mother passed from renal cell CA  Social: Non smoker, rare ETOH use, no drug use. Active at work; Works as an  and is performing heavy lifting often. Prior to recent illness she enjoyed rock climbing. She  has a past medical history of Hiatal hernia; Rhabdomyolysis due to statin therapy (12/2017); and Vitamin D deficiency. Cardiovascular ROS: Positive for chest pains. Denies dyspnea on exertion  Respiratory ROS: no cough, shortness of breath, or wheezing  Neurological ROS: no TIA or stroke symptoms  All other systems negative except as above. PE  Vitals:    01/23/18 0904   BP: 108/56   Pulse: 75   Weight: 132 lb (59.9 kg)    Body mass index is 23.13 kg/(m^2).    General appearance - alert, well appearing, and in no distress  Mental status - affect appropriate to mood  Eyes - sclera anicteric, moist mucous membranes  Neck - supple  Lymphatics - not assessed  Chest - clear to auscultation, no wheezes, rales or rhonchi  Heart - normal rate, regular rhythm, normal S1, S2, no murmurs, rubs, clicks or gallops  Abdomen - soft, nontender, nondistended, no masses or organomegaly  Back exam - full range of motion, no tenderness  Neurological - cranial nerves II through XII grossly intact, no focal deficit  Musculoskeletal - no muscular tenderness noted, normal strength  Extremities - peripheral pulses normal, no pedal edema  Skin - normal coloration  no rashes    12 lead ECG: NSR with non-specific, diffuse T-wave inversions which are mild    Recent Labs:  Lab Results   Component Value Date/Time    Cholesterol, total 383 12/02/2017 11:00 AM    HDL Cholesterol 73 12/02/2017 11:00 AM    LDL, calculated 287 12/02/2017 11:00 AM    Triglyceride 113 12/02/2017 11:00 AM     Lab Results   Component Value Date/Time    Creatinine 1.38 01/23/2018 12:00 AM     Lab Results   Component Value Date/Time    BUN 18 01/23/2018 12:00 AM     Lab Results   Component Value Date/Time    Potassium 4.2 01/23/2018 12:00 AM     Lab Results   Component Value Date/Time    Hemoglobin A1c 5.4 05/18/2017 09:14 AM     Lab Results   Component Value Date/Time    HGB 13.4 01/23/2018 12:00 AM     Lab Results   Component Value Date/Time    PLATELET 078 71/27/1571 12:00 AM       Reviewed:  Past Medical History:   Diagnosis Date    Hiatal hernia     Rhabdomyolysis due to statin therapy 12/2017    After atorvastatin 40 mg     Vitamin D deficiency      History   Smoking Status    Never Smoker   Smokeless Tobacco    Never Used     History   Alcohol Use    Yes     Comment: rare     Allergies   Allergen Reactions    Atorvastatin Other (comments)    Sulfa (Sulfonamide Antibiotics) Rash       Current Outpatient Prescriptions   Medication Sig    cholecalciferol (VITAMIN D3) 1,000 unit tablet Take  by mouth daily.     evolocumab (REPATHA SURECLICK) pen injection 1 mL by SubCUTAneous route every fourteen (14) days.  KAITLIB FE 0.8mg-25mcg(24) and 75 mg (4) chew TAKE 1 TABLET BY MOUTH EVERY DAY     No current facility-administered medications for this visit.         Saundra Kirby MD  [facility] [address] [place] [address] [address], [address] [place] [address]  Frances Ibarra  [phone]

## 2018-01-24 LAB
A1AT SERPL-MCNC: 96 MG/DL (ref 90–200)
ACTIN IGG SERPL-ACNC: 8 UNITS (ref 0–19)
ALBUMIN SERPL-MCNC: 4.8 G/DL (ref 3.5–5.5)
ALP SERPL-CCNC: 51 IU/L (ref 39–117)
ALT SERPL-CCNC: 47 IU/L (ref 0–32)
ANA TITR SER IF: NEGATIVE {TITER}
AST SERPL-CCNC: 58 IU/L (ref 0–40)
BASOPHILS # BLD AUTO: 0.1 X10E3/UL (ref 0–0.2)
BASOPHILS NFR BLD AUTO: 1 %
BILIRUB DIRECT SERPL-MCNC: 0.33 MG/DL (ref 0–0.4)
BILIRUB SERPL-MCNC: 2.3 MG/DL (ref 0–1.2)
BUN SERPL-MCNC: 18 MG/DL (ref 6–20)
BUN/CREAT SERPL: 13 (ref 9–23)
CALCIUM SERPL-MCNC: 9.4 MG/DL (ref 8.7–10.2)
CERULOPLASMIN SERPL-MCNC: 35 MG/DL (ref 19–39)
CHLORIDE SERPL-SCNC: 96 MMOL/L (ref 96–106)
CO2 SERPL-SCNC: 28 MMOL/L (ref 18–29)
COMMENT, 144067: NORMAL
CREAT SERPL-MCNC: 1.38 MG/DL (ref 0.57–1)
EOSINOPHIL # BLD AUTO: 0.3 X10E3/UL (ref 0–0.4)
EOSINOPHIL NFR BLD AUTO: 3 %
ERYTHROCYTE [DISTWIDTH] IN BLOOD BY AUTOMATED COUNT: 17 % (ref 12.3–15.4)
FERRITIN SERPL-MCNC: 142 NG/ML (ref 15–150)
GLUCOSE SERPL-MCNC: 64 MG/DL (ref 65–99)
HAV AB SER QL IA: POSITIVE
HBV CORE AB SERPL QL IA: NEGATIVE
HBV SURFACE AB SER QL: NON REACTIVE
HBV SURFACE AG SERPL QL IA: NEGATIVE
HCT VFR BLD AUTO: 40 % (ref 34–46.6)
HCV AB S/CO SERPL IA: <0.1 S/CO RATIO (ref 0–0.9)
HGB BLD-MCNC: 13.4 G/DL (ref 11.1–15.9)
IMM GRANULOCYTES # BLD: 0 X10E3/UL (ref 0–0.1)
IMM GRANULOCYTES NFR BLD: 0 %
IRON SATN MFR SERPL: 32 % (ref 15–55)
IRON SERPL-MCNC: 138 UG/DL (ref 27–159)
LYMPHOCYTES # BLD AUTO: 2.8 X10E3/UL (ref 0.7–3.1)
LYMPHOCYTES NFR BLD AUTO: 30 %
MCH RBC QN AUTO: 31.2 PG (ref 26.6–33)
MCHC RBC AUTO-ENTMCNC: 33.5 G/DL (ref 31.5–35.7)
MCV RBC AUTO: 93 FL (ref 79–97)
MONOCYTES # BLD AUTO: 0.5 X10E3/UL (ref 0.1–0.9)
MONOCYTES NFR BLD AUTO: 5 %
NEUTROPHILS # BLD AUTO: 5.6 X10E3/UL (ref 1.4–7)
NEUTROPHILS NFR BLD AUTO: 61 %
PLATELET # BLD AUTO: 334 X10E3/UL (ref 150–379)
POTASSIUM SERPL-SCNC: 4.2 MMOL/L (ref 3.5–5.2)
PROT SERPL-MCNC: 7.6 G/DL (ref 6–8.5)
RBC # BLD AUTO: 4.3 X10E6/UL (ref 3.77–5.28)
SODIUM SERPL-SCNC: 141 MMOL/L (ref 134–144)
TIBC SERPL-MCNC: 428 UG/DL (ref 250–450)
UIBC SERPL-MCNC: 290 UG/DL (ref 131–425)
WBC # BLD AUTO: 9.3 X10E3/UL (ref 3.4–10.8)

## 2018-01-25 PROBLEM — M62.82 RHABDOMYOLYSIS DUE TO STATIN THERAPY: Status: ACTIVE | Noted: 2018-01-25

## 2018-01-25 PROBLEM — T46.6X5A RHABDOMYOLYSIS DUE TO STATIN THERAPY: Status: ACTIVE | Noted: 2018-01-25

## 2018-01-25 PROBLEM — R52 BODY ACHES: Status: ACTIVE | Noted: 2018-01-25

## 2018-01-26 ENCOUNTER — TELEPHONE (OUTPATIENT)
Dept: INTERNAL MEDICINE CLINIC | Age: 38
End: 2018-01-26

## 2018-01-26 NOTE — TELEPHONE ENCOUNTER
Returned call to patient. Stated she received her results back from the lab work from the [facility]. Stated she would like  input on the results being that she tested + Hep A and other elevated results. She is requesting either a return call from the doctor or a message sent as to what to do as the next step. States she is also waiting to get  input. Please advise.

## 2018-01-29 ENCOUNTER — TELEPHONE (OUTPATIENT)
Dept: HEMATOLOGY | Age: 38
End: 2018-01-29

## 2018-01-29 NOTE — TELEPHONE ENCOUNTER
Patient left voice mail stating she had a question about her lab work. Left voice mail for patient to return call.

## 2018-01-29 NOTE — TELEPHONE ENCOUNTER
Returned patient call in reference to stomach issues. Patient stated her stomach feels like it burns when she hungry. She feels better after eating. She has been taking tums to aleviate the pain. Patient was advised to switch to pepsid or prevacid for two week to see if she feels any better. Patient also advised to drink more fluids due to creatine count elevated. Will inform Dr. Lucio Harvey of results.

## 2018-01-29 NOTE — TELEPHONE ENCOUNTER
Informed patient Dr Oyl Mcintyre has sent message to Dr Gabriel Turner to contact regarding labs and next steps. Pt verbalized understanding.

## 2018-02-08 ENCOUNTER — TELEPHONE (OUTPATIENT)
Dept: CARDIOLOGY CLINIC | Age: 38
End: 2018-02-08

## 2018-02-08 NOTE — TELEPHONE ENCOUNTER
Pt is calling in regards to a recent e-mail she sent through My chart to Dr. Nigel Naranjo. Pt can be reached at [phone].     Thank you,  Mariia

## 2018-02-09 ENCOUNTER — DOCUMENTATION ONLY (OUTPATIENT)
Dept: CARDIOLOGY CLINIC | Age: 38
End: 2018-02-09

## 2018-02-09 ENCOUNTER — TELEPHONE (OUTPATIENT)
Dept: CARDIOLOGY CLINIC | Age: 38
End: 2018-02-09

## 2018-02-09 NOTE — PROGRESS NOTES
Received a fax from [facility] stating that Ms. Fadumo Mohr has been denied Emilia Allgamal by her insurance. Patient has been notified.

## 2018-02-09 NOTE — TELEPHONE ENCOUNTER
Ameena Prasad called to make the pt aware that she was denied for assistance. Thanks!   Rosemarie Gross

## 2018-02-09 NOTE — TELEPHONE ENCOUNTER
Called patient. Verified identity. Informed her of the denial for Repatha per her insurance. Provided patient with contact information to Repatha to help seek assistance. Also gave her contact information to [employer]. Patient states she will call repatha. Confirmed upcoming appointment with Dr. Ivana Morgan 2/28/18. No further orders for medication at this time per Dr. Ivana Morgan as patient recovers from Rhabdomyolysis.

## 2018-02-12 ENCOUNTER — OFFICE VISIT (OUTPATIENT)
Dept: INTERNAL MEDICINE CLINIC | Age: 38
End: 2018-02-12

## 2018-02-12 VITALS
HEIGHT: 63 IN | OXYGEN SATURATION: 99 % | RESPIRATION RATE: 17 BRPM | SYSTOLIC BLOOD PRESSURE: 88 MMHG | HEART RATE: 81 BPM | DIASTOLIC BLOOD PRESSURE: 60 MMHG | BODY MASS INDEX: 23.14 KG/M2 | WEIGHT: 130.6 LBS | TEMPERATURE: 98 F

## 2018-02-12 DIAGNOSIS — J30.2 CHRONIC SEASONAL ALLERGIC RHINITIS DUE TO OTHER ALLERGEN: Primary | ICD-10-CM

## 2018-02-12 RX ORDER — AA/PROT/LYSINE/METHIO/VIT C/B6 50-12.5 MG
TABLET ORAL
COMMUNITY
End: 2020-02-06 | Stop reason: ALTCHOICE

## 2018-02-12 NOTE — PATIENT INSTRUCTIONS
Please try Pseudoephdrine (Sudafed) for your symptoms. Managing Your Allergies: Care Instructions  Your Care Instructions    Managing your allergies is an important part of staying healthy. Your doctor will help you find out what may be causing the allergies. Common causes of allergy symptoms are house dust and dust mites, animal dander, mold, and pollen. As soon as you know what triggers your symptoms, try to reduce your exposure to your triggers. This can help prevent allergy symptoms, asthma, and other health problems. Ask your doctor about allergy medicine or immunotherapy. These treatments may help reduce or prevent allergy symptoms. Follow-up care is a key part of your treatment and safety. Be sure to make and go to all appointments, and call your doctor if you are having problems. It's also a good idea to know your test results and keep a list of the medicines you take. How can you care for yourself at home? · If you think that dust or dust mites are causing your allergies:  ¨ Wash sheets, pillowcases, and other bedding every week in hot water. ¨ Use airtight, dust-proof covers for pillows, duvets, and mattresses. Avoid plastic covers, because they tend to tear quickly and do not \"breathe. \" Wash according to the instructions. ¨ Remove extra blankets and pillows that you don't need. ¨ Use blankets that are machine-washable. ¨ Don't use home humidifiers. They can help mites live longer. · Use air-conditioning. Change or clean all filters every month. Keep windows closed. Use high-efficiency air filters. Don't use window or attic fans, which draw dust into the air. · If you're allergic to pet dander, keep pets outside or, at the very least, out of your bedroom. Old carpet and cloth-covered furniture can hold a lot of animal dander. You may need to replace them. · Look for signs of cockroaches. Use cockroach baits to get rid of them. Then clean your home well.   · If you're allergic to mold, don't keep indoor plants, because molds can grow in soil. Get rid of furniture, rugs, and drapes that smell musty. Check for mold in the bathroom. · If you're allergic to pollen, stay inside when pollen counts are high. · Don't smoke or let anyone else smoke in your house. Don't use fireplaces or wood-burning stoves. Avoid paint fumes, perfumes, and other strong odors. When should you call for help? Give an epinephrine shot if:  ? · You think you are having a severe allergic reaction. ? After giving an epinephrine shot call 911, even if you feel better. ?Call 911 if:  ? · You have symptoms of a severe allergic reaction. These may include:  ¨ Sudden raised, red areas (hives) all over your body. ¨ Swelling of the throat, mouth, lips, or tongue. ¨ Trouble breathing. ¨ Passing out (losing consciousness). Or you may feel very lightheaded or suddenly feel weak, confused, or restless. ? · You have been given an epinephrine shot, even if you feel better. ?Call your doctor now or seek immediate medical care if:  ? · You have symptoms of an allergic reaction, such as:  ¨ A rash or hives (raised, red areas on the skin). ¨ Itching. ¨ Swelling. ¨ Belly pain, nausea, or vomiting. ? Watch closely for changes in your health, and be sure to contact your doctor if:  ? · Your allergies get worse. ? · You need help controlling your allergies. ? · You have questions about allergy testing. ? · You do not get better as expected. Where can you learn more? Go to http://cristina-mily.info/. Enter L249 in the search box to learn more about \"Managing Your Allergies: Care Instructions. \"  Current as of: September 29, 2016  Content Version: 11.4  © 2882-0586 [employer]. Care instructions adapted under license by [employer] (which disclaims liability or warranty for this information).  If you have questions about a medical condition or this instruction, always ask your healthcare professional. [address] any warranty or liability for your use of this information.

## 2018-02-12 NOTE — PROGRESS NOTES
Acute Care Note    Юлия Layton is 40 y.o. female. she presents for evaluation of Ear Pain and Sore Throat    Patient presents with ear pain and throat pain which has been present for the past 2 weeks. She is concerned about this because she has had strep throat last October and it is worried that this has returned. She denies fever, cough or shortness of breath at this time. She has had no known sick contacts recently. Prior to Admission medications    Medication Sig Start Date End Date Taking? Authorizing Provider   coenzyme q10 (CO Q-10) 10 mg cap Take  by mouth. Yes Historical Provider   cholecalciferol (VITAMIN D3) 1,000 unit tablet Take  by mouth daily. Yes Historical Provider   KAITLIB FE 0.8mg-25mcg(24) and 75 mg (4) chew TAKE 1 TABLET BY MOUTH EVERY DAY 5/9/17  Yes Historical Provider   evolocumab (REPATHA SURECLICK) pen injection 1 mL by SubCUTAneous route every fourteen (14) days. 1/23/18   Idris Galan MD         Patient Active Problem List   Diagnosis Code    Seasonal affective disorder ([facility] [address] F33.9    Depression, major, in remission ([facility] [address] F32.5    Family history of renal cancer Z80.51    Mixed hyperlipidemia E78.2    Vitamin D insufficiency E55.9    Elevated liver enzymes R74.8    Rhabdomyolysis due to statin therapy M62.82    Body aches R52         Review of Systems   Constitutional: Negative for chills and fever. HENT: Positive for ear pain and sore throat. Respiratory: Negative. Cardiovascular: Negative. Visit Vitals    BP (!) 88/60 (BP 1 Location: Right arm, BP Patient Position: Sitting)    Pulse 81    Temp 98 °F (36.7 °C) (Oral)    Resp 17    Ht 5' 3\" (1.6 m)    Wt 130 lb 9.6 oz (59.2 kg)    SpO2 99%    BMI 23.13 kg/m2       Physical Exam   Constitutional: No distress. HENT:   Mouth/Throat: Oropharynx is clear and moist.   Cardiovascular: Normal rate and regular rhythm.     Pulmonary/Chest: Effort normal and breath sounds normal. She has no wheezes. ASSESSMENT/PLAN  Diagnoses and all orders for this visit:    1. Chronic seasonal allergic rhinitis due to other allergen -discussed with the patient, her symptoms likely represent postnasal drip and throat soreness as result. She has no evidence of erythema in her posterior pharynx. As such, I do not believe she has strep throat. She will follow-up if not improved after a trial of antihistamine. Advised the patient to call back or return to office if symptoms worsen/change/persist.   Discussed expected course/resolution/complications of diagnosis in detail with patient. Medication risks/benefits/costs/interactions/alternatives discussed with patient. The patient was given an after visit summary which includes diagnoses, current medications, & vitals. They expressed understanding with the diagnosis and plan.

## 2018-02-22 LAB
25(OH)D3+25(OH)D2 SERPL-MCNC: 32.9 NG/ML (ref 30–100)
ALBUMIN SERPL-MCNC: 4.8 G/DL (ref 3.5–5.5)
ALBUMIN/GLOB SERPL: 1.8 {RATIO} (ref 1.2–2.2)
ALP SERPL-CCNC: 48 IU/L (ref 39–117)
ALT SERPL-CCNC: 65 IU/L (ref 0–32)
AST SERPL-CCNC: 87 IU/L (ref 0–40)
BILIRUB SERPL-MCNC: 2.2 MG/DL (ref 0–1.2)
BUN SERPL-MCNC: 19 MG/DL (ref 6–20)
BUN/CREAT SERPL: 13 (ref 9–23)
CALCIUM SERPL-MCNC: 9.4 MG/DL (ref 8.7–10.2)
CHLORIDE SERPL-SCNC: 94 MMOL/L (ref 96–106)
CK SERPL-CCNC: 1604 U/L (ref 24–173)
CO2 SERPL-SCNC: 24 MMOL/L (ref 18–29)
CREAT SERPL-MCNC: 1.43 MG/DL (ref 0.57–1)
GFR SERPLBLD CREATININE-BSD FMLA CKD-EPI: 47 ML/MIN/{1.73_M2}
GFR SERPLBLD CREATININE-BSD FMLA CKD-EPI: 54 ML/MIN/{1.73_M2}
GLOBULIN SER CALC-MCNC: 2.7 G/L (ref 1.5–4.5)
GLUCOSE SERPL-MCNC: 107 MG/DL (ref 65–99)
INTERPRETATION: NORMAL
MAGNESIUM SERPL-MCNC: 2.2 MG/DL (ref 1.6–2.3)
POTASSIUM SERPL-SCNC: 4.2 MMOL/L (ref 3.5–5.2)
PROT SERPL-MCNC: 7.5 G/DL (ref 6–8.5)
SODIUM SERPL-SCNC: 138 MMOL/L (ref 134–144)

## 2018-02-23 ENCOUNTER — OFFICE VISIT (OUTPATIENT)
Dept: HEMATOLOGY | Age: 38
End: 2018-02-23

## 2018-02-23 ENCOUNTER — TELEPHONE (OUTPATIENT)
Dept: HEMATOLOGY | Age: 38
End: 2018-02-23

## 2018-02-23 VITALS
TEMPERATURE: 97.3 F | HEART RATE: 78 BPM | DIASTOLIC BLOOD PRESSURE: 70 MMHG | SYSTOLIC BLOOD PRESSURE: 92 MMHG | HEIGHT: 63 IN | WEIGHT: 130 LBS | BODY MASS INDEX: 23.04 KG/M2 | OXYGEN SATURATION: 100 %

## 2018-02-23 DIAGNOSIS — R74.8 ELEVATED LIVER ENZYMES: Primary | ICD-10-CM

## 2018-02-23 RX ORDER — BISMUTH SUBSALICYLATE 262 MG
1 TABLET,CHEWABLE ORAL DAILY
COMMUNITY

## 2018-02-23 NOTE — MR AVS SNAPSHOT
[address] [facility] [phone] [address] 
[phone] Patient: Patti Obrien MRN: [id number] IUN:93/18/[id number] Visit Information Date & Time Provider Department Dept. Phone Encounter #  
 2/23/2018 10:15 AM Lorenzo Shi. [address] [place] [id number] Follow-up Instructions Return in about 3 months (around 5/23/2018) for NP. Your Appointments 2/28/2018 11:00 AM  
ESTABLISHED PATIENT with Catrachita Martínez MD  
[facility] ([address]) Appt Note: 6 wk f/u per Dr. Bayron Maxwell [address]  [address] [facility] [address]  
[address] [place] [address]   
  
    
 5/3/2018  9:30 AM  
ROUTINE CARE with Emmanuel Man MD  
[facility] [address] [place] [address]) Appt Note: f/u [address] [facility] [address]  
[address] [place] [address]   
  
    
 5/31/2018  9:00 AM  
PHYSICAL with Emmanuel Man MD  
[facility] [address] [place] [address]) Appt Note: [address] [facility] [address] [facility] [address]  
[address] [facility] Upcoming Health Maintenance Date Due  
 PAP AKA CERVICAL CYTOLOGY 6/14/2018 DTaP/Tdap/Td series (2 - Td) 1/1/2024 Allergies as of 2/23/2018  Review Complete On: 2/23/2018 By: Jorge Mendoza LPN Severity Noted Reaction Type Reactions Atorvastatin  01/23/2018    Other (comments) Sulfa (Sulfonamide Antibiotics)  01/26/2017    Rash Current Immunizations  Reviewed on 5/9/2017 Name Date Influenza Vaccine (Quad) PF 9/29/2017 Not reviewed this visit Vitals  BP Pulse Temp Height(growth percentile) Weight(growth percentile) SpO2  
 92/70 (BP 1 Location: Left arm, BP Patient Position: Sitting) 78 97.3 °F (36.3 °C) (Tympanic) 5' 3\" (1.6 m) 130 lb (59 kg) 100% BMI OB Status Smoking Status 23.03 kg/m2 Medically Induced Never Smoker Vitals History BMI and BSA Data Body Mass Index Body Surface Area 23.03 kg/m 2 1.62 m 2 Preferred Pharmacy Pharmacy Name Phone [id number] [phone] Your Updated Medication List  
  
   
This list is accurate as of 2/23/18 11:36 AM.  Always use your most recent med list.  
  
  
  
  
 CO Q-10 10 mg Cap Generic drug:  coenzyme q10 Take  by mouth. evolocumab pen injection Commonly known as:  Eudelia  1 mL by SubCUTAneous route every fourteen (14) days. KAITLIB FE 0.8mg-25mcg(24) and 75 mg (4) Chew Generic drug:  noreth-ethinyl estradiol-iron TAKE 1 TABLET BY MOUTH EVERY DAY  
  
 multivitamin tablet Commonly known as:  ONE A DAY Take 1 Tab by mouth daily. VITAMIN D3 1,000 unit tablet Generic drug:  cholecalciferol Take  by mouth daily. Follow-up Instructions Return in about 3 months (around 5/23/2018) for NP. Introducing [facility]! Dear Paulette Ramírezs: Thank you for requesting a [employer] account. Our records indicate that you already have an active [employer] account. You can access your account anytime at [email or web address] Did you know that you can access your hospital and ER discharge instructions at any time in [employer]? You can also review all of your test results from your hospital stay or ER visit. Additional Information If you have questions, please visit the Frequently Asked Questions section of the [employer] website at [email or web address] Remember, [employer] is NOT to be used for urgent needs. For medical emergencies, dial 911. Now available from your iPhone and Android! Please provide this summary of care documentation to your next provider. Your primary care clinician is listed as Zunilda Deshpande. If you have any questions after today's visit, please call [phone].

## 2018-02-23 NOTE — TELEPHONE ENCOUNTER
Spoke with patient to inform her that Dr. Kasey Hale spoke with Dr. Venkata Guzman. Dr. Venkata Guzman will move Ms. Ravi up on her schedule. Patient had no further questions.

## 2018-02-23 NOTE — PROGRESS NOTES
70 Zachary Chaudhry MD, [address], [facility], [place]       Fina Arevalo, REY Bishop, [facility]   De Zhu, CELI [facility] 136    at [address]    [address] [place] [address], [address] Roberto Burrows  22.    [phone]    FAX: [address] [place] [address], [address]    [phone]    FAX: [phone]         Patient Care Team:  Dinora Gilliam MD as PCP - General (Internal Medicine)  Malu Bojorquez MD (Cardiology)  Azar Bravo MD (Internal Medicine)      Problem List  Date Reviewed: 2/12/2018          Codes Class Noted    Rhabdomyolysis due to statin therapy ICD-10-CM: M62.82  ICD-9-CM: 728.88  1/25/2018        Body aches ICD-10-CM: R52  ICD-9-CM: 780.96  1/25/2018        Elevated liver enzymes ICD-10-CM: R74.8  ICD-9-CM: 790.5  1/23/2018        Mixed hyperlipidemia ICD-10-CM: E78.2  ICD-9-CM: 272.2  5/26/2017        Vitamin D insufficiency ICD-10-CM: E55.9  ICD-9-CM: 268.9  5/26/2017        Seasonal affective disorder ([address] ICD-10-CM: F33.9  ICD-9-CM: 296.99  1/26/2017        Depression, major, in remission [facility]) ICD-10-CM: F32.5  ICD-9-CM: 296.25  1/26/2017        Family history of renal cancer ICD-10-CM: Z80.51  ICD-9-CM: V16.51  1/26/2017                Becky Gaona returns to the [address] for management of elevated liver enzymes. The active problem list, all pertinent past medical history, medications, radiologic findings and laboratory findings related to the liver disorder were reviewed with the patient. The patient is a 40 y.o.   female who was first noted to have abnormalities in liver transaminases in 10/2017 after she was started on a statin and developed muscle pain and weakness. Serologic evaluation for markers of chronic liver disease were negative. Ultrasound of the liver was performed in 5/2017. The results of the imaging suggested fatty liver disease. Assessment of liver fibrosis was performed in the office today with Fibroscan. The result was 3.1 kPa which correlates with no fibrosis. The patient was started on a statin in 12/2017 and developed muslce pain and an elevation in CK. The statin was stopped. However, the CK has not returned to normal and she continues to complain of muscle weakness. The most recent laboratory studies indicate that the liver transaminases are elevated, ALP is normal, tests of hepatic synthetic and metabolic function are normal, and the platelet count is Normal.    The patient notes fatigue,     The patient has limitations in functional activities secondary to these symptoms. The patient has not experienced pain in the right side over the liver,       ALLERGIES  Allergies   Allergen Reactions    Atorvastatin Other (comments)    Sulfa (Sulfonamide Antibiotics) Rash       MEDICATIONS  Current Outpatient Prescriptions   Medication Sig    multivitamin (ONE A DAY) tablet Take 1 Tab by mouth daily.  coenzyme q10 (CO Q-10) 10 mg cap Take  by mouth.  cholecalciferol (VITAMIN D3) 1,000 unit tablet Take  by mouth daily.  KAITLIB FE 0.8mg-25mcg(24) and 75 mg (4) chew TAKE 1 TABLET BY MOUTH EVERY DAY    evolocumab (REPATHA SURECLICK) pen injection 1 mL by SubCUTAneous route every fourteen (14) days. No current facility-administered medications for this visit. SYSTEM REVIEW NOT RELATED TO LIVER DISEASE OR REVIEWED ABOVE:  Constitution systems: Negative for fever, chills, weight gain, weight loss. Eyes: Negative for visual changes. ENT: Negative for sore throat, painful swallowing. Respiratory: Negative for cough, hemoptysis, SOB.    Cardiology: Negative for chest pain, palpitations. GI:  Negative for constipation or diarrhea. : Negative for urinary frequency, dysuria, hematuria, nocturia. Skin: Negative for rash. Hematology: Negative for easy bruising, blood clots. Musculo-skelatal: Negative for back pain, muscle pain, weakness. Neurologic: Negative for headaches, dizziness, vertigo, memory problems not related to HE. Psychology: Negative for anxiety, depression. FAMILY HISTORY:  The father is alive and healthy. has the following chronic diseases: CHOL. The mother  of kidney cancer. There is no family history of liver disease. SOCIAL HISTORY:  The patient has never been . The patient has no children. The patient has never used tobacco products. The patient has never consumed significant amounts of alcohol. The patient currently works full time . PHYSICAL EXAMINATION:  Visit Vitals    BP 92/70 (BP 1 Location: Left arm, BP Patient Position: Sitting)    Pulse 78    Temp 97.3 °F (36.3 °C) (Tympanic)    Ht 5' 3\" (1.6 m)    Wt 130 lb (59 kg)    SpO2 100%    BMI 23.03 kg/m2     General: No acute distress. Eyes: Sclera anicteric. ENT: No oral lesions. Thyroid normal.  Nodes: No adenopathy. Skin: No spider angiomata. No jaundice. No palmar erythema. Respiratory: Lungs clear to auscultation. Cardiovascular: Regular heart rate. No murmurs. No JVD. Abdomen: Soft non-tender. Liver size normal to percussion/palpation. Spleen not palpable. No obvious ascites. Extremities: No edema. No muscle wasting. No gross arthritic changes. Neurologic: Alert and oriented. Cranial nerves grossly intact. No asterixis.     LABORATORY STUDIES:  Liver [place] of [address] Units 2018   WBC 3.4 - 10.8 x10E3/uL  9.3   ANC 1.4 - 7.0 x10E3/uL  5.6   HGB 11.1 - 15.9 g/dL  13.4    - 379 x10E3/uL  334   AST 0 - 40 IU/L 87 (H) 58 (H)   ALT 0 - 32 IU/L 65 (H) 47 (H)   Alk Phos 39 - 117 IU/L 48 51   Bili, Total 0.0 - 1.2 mg/dL 2.2 (H) 2.3 (H)   Bili, Direct 0.00 - 0.40 mg/dL  0.33   Albumin 3.5 - 5.5 g/dL 4.8 4.8   BUN 6 - 20 mg/dL 19 18   Creat 0.57 - 1.00 mg/dL 1.43 (H) 1.38 (H)   Na 134 - 144 mmol/L 138 141   K 3.5 - 5.2 mmol/L 4.2 4.2   Cl 96 - 106 mmol/L 94 (L) 96   CO2 18 - 29 mmol/L 24 28   Glucose 65 - 99 mg/dL 107 (H) 64 (L)   Magnesium 1.6 - 2.3 mg/dL 2.2      SEROLOGIES:  Serologies Latest Ref Rng & Units 1/23/2018   Hep A Ab, Total Negative Positive (A)   Hep B Surface Ag Negative Negative   Hep B Core Ab, Total Negative Negative   Hep B Surface AB QL  Non Reactive   Hep C Ab 0.0 - 0.9 s/co ratio <0.1   Ferritin 15 - 150 ng/mL 142   Iron % Saturation 15 - 55 % 32   ILEANA, IFA  Negative   ASMCA 0 - 19 Units 8   Ceruloplasmin 19.0 - 39.0 mg/dL 35.0   Alpha-1 antitrypsin level 90 - 200 mg/dL 96     LIVER HISTOLOGY:  Not available or performed    ENDOSCOPIC PROCEDURES:  Not available or performed    RADIOLOGY:  10/2017. Ultrasound of liver. Echogenic consistent with fatty liver. No liver mass lesions. No dilated bile ducts. No ascites. OTHER TESTING:  Not available or performed    ASSESSMENT AND PLAN:  Intermitant elevation in liver transaminases of unclear etiology at this time. Liver function is normal.  The platelet count is normal.      Based upon laboratory studies, Fibroscan and imaging the patient does not appear to have any liver injury. Serologic testing to help define the cause of the laboratory abnormality were ordered. The most likely causes for the liver chemistry abnormalities were discussed with the patient and include immune liver disorders,     I suspect the ongoing elevation in CK and muscle weakness is also due to an immune process and was not likely to be due to the statins. I have discussed the situation with Kuldeep Alcala MD.  She will see the patient.   IF she is found to have a myositius and is started on immune suppression I suspect the liver enzymes will normalize as well. Will perform laboratory testing to monitor liver function and degree of liver injury. This included BMP, hepatic panel, CBC with platelet count, INR. There is no reason to perform liver biopsy at this time. The Fibroscan suggests there is no liver injury. Liver chemistries will be monitored. The Fibroscan can be repeated annually to assess for fibrosis progression and need for treatment of the liver disorder. The patient was directed to continue all current medications at the current dosages. There are no contraindications for the patient to take any medications that are necessary for treatment of other medical issues. The patient was counseled regarding alcohol consumption. The need for vaccination against viral hepatitis A and B will be assessed with serologic and instituted as appropriate. All of the above issues were discussed with the patient. All questions were answered. The patient expressed a clear understanding of the above. [address] [place] [address] in 4 weeks for Fibroscan, to review all data and determine the treatment plan.     Jerome Gabriel MD  Liver [place] [address]  Roberto Ibarra  22.  [phone]

## 2018-02-28 ENCOUNTER — OFFICE VISIT (OUTPATIENT)
Dept: CARDIOLOGY CLINIC | Age: 38
End: 2018-02-28

## 2018-02-28 VITALS
SYSTOLIC BLOOD PRESSURE: 96 MMHG | HEART RATE: 62 BPM | BODY MASS INDEX: 23.07 KG/M2 | WEIGHT: 130.2 LBS | HEIGHT: 63 IN | DIASTOLIC BLOOD PRESSURE: 52 MMHG

## 2018-02-28 DIAGNOSIS — E78.01 FAMILIAL HYPERCHOLESTEROLEMIA: Primary | ICD-10-CM

## 2018-02-28 DIAGNOSIS — M62.82 RHABDOMYOLYSIS DUE TO STATIN THERAPY: ICD-10-CM

## 2018-02-28 DIAGNOSIS — T46.6X5A RHABDOMYOLYSIS DUE TO STATIN THERAPY: ICD-10-CM

## 2018-02-28 DIAGNOSIS — R79.89 LFTS ABNORMAL: ICD-10-CM

## 2018-02-28 DIAGNOSIS — R74.8 ELEVATED CK: ICD-10-CM

## 2018-02-28 PROBLEM — E78.2 MIXED HYPERLIPIDEMIA: Status: RESOLVED | Noted: 2017-05-26 | Resolved: 2018-02-28

## 2018-02-28 RX ORDER — OMEPRAZOLE 20 MG/1
20 CAPSULE, DELAYED RELEASE ORAL DAILY
COMMUNITY
End: 2018-05-31

## 2018-02-28 NOTE — MR AVS SNAPSHOT
[address] [facility] [address] [place] [address] 
[phone] Patient: Corbin Sy MRN: [id number] ZP Visit Information Date & Time Provider Department Dept. Phone Encounter #  
 2018 11:00 AM Tere Solis MD [facility] Samuel Berrios [phone] [id number] Your Appointments 5/3/2018  9:30 AM  
ROUTINE CARE with Robert aSnchez MD  
[address] Internal Medicine [address] [place] [address]) Appt Note: f/u [address] [facility] [address]  
[address] [facility]  
  
    
 2018 10:00 AM  
Follow Up with DEION Bautista 75 ([facility]) Appt Note: Follow up [address] [facility] [phone] [place] [id number]  
[phone]  
  
   
 [address] [place] [address]  
  
    
 2018  9:00 AM  
PHYSICAL with Robert Sanchez MD  
[address] Internal Medicine [address] [place] [address]) Appt Note: [address] [facility] [address] [facility] [address]  
[address] [address] [facility] 43 [facility] 57 Upcoming Health Maintenance Date Due  
 PAP AKA CERVICAL CYTOLOGY 2018 DTaP/Tdap/Td series (2 - Td) 2024 Allergies as of 2018  Review Complete On: 2018 By: Rudy Mo Severity Noted Reaction Type Reactions Atorvastatin  2018    Other (comments) Sulfa (Sulfonamide Antibiotics)  2017    Rash Current Immunizations  Reviewed on 2017 Name Date Influenza Vaccine (Quad) PF 2017 Not reviewed this visit You Were Diagnosed With   
  
 Codes Comments Familial hypercholesterolemia    -  Primary ICD-10-CM: E78.01 
ICD-9-CM: 272.0 Rhabdomyolysis due to statin therapy     ICD-10-CM: M62.82 ICD-9-CM: 728.88 Vitals BP Pulse Height(growth percentile) Weight(growth percentile) BMI OB Status 96/52 (BP 1 Location: Left arm, BP Patient Position: Sitting) 62 5' 3\" (1.6 m) 130 lb 3.2 oz (59.1 kg) 23.06 kg/m2 Medically Induced Smoking Status Never Smoker Vitals History BMI and BSA Data Body Mass Index Body Surface Area 23.06 kg/m 2 1.62 m 2 Preferred Pharmacy Pharmacy Name Phone [id number] [phone] Your Updated Medication List  
  
   
This list is accurate as of 18 11:43 AM.  Always use your most recent med list.  
  
  
  
  
 alirocumab 75 mg/mL injector pen Commonly known as:  PRALUENT PEN  
1 mL by SubCUTAneous route Once every 2 weeks. CO Q-10 10 mg Cap Generic drug:  coenzyme q10 Take  by mouth. KAITLIB FE 0.8mg-25mcg(24) and 75 mg (4) Chew Generic drug:  noreth-ethinyl estradiol-iron TAKE 1 TABLET BY MOUTH EVERY DAY  
  
 multivitamin tablet Commonly known as:  ONE A DAY Take 1 Tab by mouth daily. omeprazole 20 mg capsule Commonly known as:  PRILOSEC Take 20 mg by mouth daily. VITAMIN D3 1,000 unit tablet Generic drug:  cholecalciferol Take  by mouth daily. Prescriptions Printed Refills  
 alirocumab (PRALUENT PEN) 75 mg/mL injector pen 11 Si mL by SubCUTAneous route Once every 2 weeks. Class: Print Route: SubCUTAneous We Performed the Following LIPID PANEL [10700 CPT(R)] Patient Instructions Please let us know what happens with your rheumatologic workup with Dr. Saundra Francisco Make sure you are fasting when you have your cholesterol labs drawn Introducing [facility]! Dear Ramin Franks: Thank you for requesting a eOriginal account. Our records indicate that you already have an active eOriginal account. You can access your account anytime at [email or web address]. [employer] Did you know that you can access your hospital and ER discharge instructions at any time in [employer]? You can also review all of your test results from your hospital stay or ER visit. Additional Information If you have questions, please visit the Frequently Asked Questions section of the [employer] website at [email or web address] Remember, [employer] is NOT to be used for urgent needs. For medical emergencies, dial 911. Now available from your iPhone and Android! Please provide this summary of care documentation to your next provider. Your primary care clinician is listed as Murali Delgado. If you have any questions after today's visit, please call [phone].

## 2018-02-28 NOTE — PROGRESS NOTES
[facility] [address] [place] [address]  [phone]    Referring physician: Dr. Hang Dang    HPI: Leanne Mccarthy, a 40y.o. year-old who presents for follow up regarding her familial hypercholesterolemia.      Needs rheumatology workup and planning to see Dr. Shante Lawton on 3/8/18 for her elevated CK levels  She saw Dr. Hopkins/hepatology and he didn't think her CK level was related to her statin therapy and also recommended that she see rheumatology   She mentions having some recent issues with her hearing and vision, advised her to notify Dr. Camille Delacruz of this at her visit  Trying to get her approved for PCSK9 inhibitor for her familial hypercholesterolemia - Nilam wanted her to have a T[place] score of 8 and her score was only 6  Prayoana said she would likely be approved with her significantly LDL without genetic testing and regardless of her Togo score  Completed application today and [facility]    Her LDL was 287 on 12/2/17 and she was started on atorvastatin 40 mg daily on 12/6/17   On 12/29/17 she called PCP office c/o muscle weakness in her arms and while brushing her teeth  She had soreness and weakness the previous 2 weeks and felt it was related to her new statin therapy  Her CK level was 553 and her LFTs were slightly elevated and mild LIBERTAD  She was treated for rhabdomyolysis with oral hydration for statin induced rhabdomyolysis  Her last labs on 2/21/18 show her CK level is even more elevated at 1604 and her AST is 87 and ALT is 65, her renal function normalized  She continues to feel weak and is having myalgias  She was given a letter to refrain from her  duties til 3/15/18, 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  Advised her to refrain from her  duties until her rheumatologic workup is complete  She denies chest pain or dyspnea  No palpitations  No dizziness or syncope  No LE edema  She is currently struggling with feelings of depression regarding her recent illness with rhabdomyolysis, limitations at work, concern over cholesterol, etc  She has no plans to harm herself, we discussed holding off on anti-depressants at this time so as not to complicate the picture      She was told about her high cholesterol in May of 2017 - she had been trying to eat better but relied frequently on packaged/frozen meals. Since the fall of 2017 when her LDL spiked she had been following a mediterranean diet and her boyfriend was helping with cooking. She met with a nutritionist for further education/advice. She was very active at work and enjoyed the outdoors and rock climbing for exercise. Previously we discussed that her LDL levels are significant enough that particle size testing would only be an added cost and would not change her treatment plan. Assessment/Plan:  1. Familial Hypercholesterolemia:  in 12/2017 but then developed mild LIBERTAD, elevated LFTs and rhabdomyolysis on atorvastatin 40mg daily   -hesitant to trial another statin since she is still feeling poorly   -unlikely that the addition of Zetia will get her LDL to goal, therefore we will start insurance approval for [facility]   -[facility] Lipid Network score of 6  -Encouraged low carb, mediterranean diet and low impact exercise  -Follow up in the office in 3 months  2. Rhabdomyolysis: Statin induced, CK elevation persists, continues on coQ10 supplement for myalgias, staying hydrated  -plans to see rheumatology next week for evaluation   3. Vitamin d deficiency: Vit D 29 in 1/2017 and now taking daily supplement  4. Elevated LFTs:  Seen by Dr. Lorenza lCement and now plans to see rheumatology next week     Family History: significant family history of heart disease, father with dyslipidemia, paternal GF with MI, paternal GM with MI, mother passed from renal cell CA  Social: Non smoker, rare ETOH use, no drug use. Active at work;  Works as an  and is performing heavy lifting often. Prior to recent illness she enjoyed rock climbing. She  has a past medical history of Hiatal hernia; Rhabdomyolysis due to statin therapy (12/2017); and Vitamin D deficiency. Cardiovascular ROS: negative for chest pain or GABRIEL   Respiratory ROS: no cough, shortness of breath, or wheezing  Neurological ROS: no TIA or stroke symptoms  All other systems negative except as above. PE  Vitals:    02/28/18 1106   BP: 96/52   Pulse: 62   Weight: 130 lb 3.2 oz (59.1 kg)   Height: 5' 3\" (1.6 m)    Body mass index is 23.06 kg/(m^2).    General appearance - alert, well appearing, and in no distress  Mental status - affect appropriate to mood  Eyes - sclera anicteric, moist mucous membranes  Neck - supple  Lymphatics - not assessed  Chest - clear to auscultation, no wheezes, rales or rhonchi  Heart - normal rate, regular rhythm, normal S1, S2, no murmurs, rubs, clicks or gallops  Abdomen - soft, nontender, nondistended, no masses or organomegaly  Back exam - full range of motion, no tenderness  Neurological - cranial nerves II through XII grossly intact, no focal deficit  Musculoskeletal - no muscular tenderness noted, normal strength  Extremities - peripheral pulses normal, no pedal edema  Skin - normal coloration  no rashes    Recent Labs:  Lab Results   Component Value Date/Time    Cholesterol, total 383 (H) 12/02/2017 11:00 AM    HDL Cholesterol 73 12/02/2017 11:00 AM    LDL, calculated 287 (H) 12/02/2017 11:00 AM    Triglyceride 113 12/02/2017 11:00 AM     Lab Results   Component Value Date/Time    Creatinine 1.43 (H) 02/21/2018 08:14 AM     Lab Results   Component Value Date/Time    BUN 19 02/21/2018 08:14 AM     Lab Results   Component Value Date/Time    Potassium 4.2 02/21/2018 08:14 AM     Lab Results   Component Value Date/Time    Hemoglobin A1c 5.4 05/18/2017 09:14 AM     Lab Results   Component Value Date/Time    HGB 13.4 01/23/2018 12:00 AM     Lab Results   Component Value Date/Time    PLATELET 789 01/23/2018 12:00 AM       Reviewed:  Past Medical History:   Diagnosis Date    Hiatal hernia     Rhabdomyolysis due to statin therapy 12/2017    After atorvastatin 40 mg     Vitamin D deficiency      History   Smoking Status    Never Smoker   Smokeless Tobacco    Never Used     History   Alcohol Use    Yes     Comment: rare     Allergies   Allergen Reactions    Atorvastatin Other (comments)    Sulfa (Sulfonamide Antibiotics) Rash       Current Outpatient Prescriptions   Medication Sig    omeprazole (PRILOSEC) 20 mg capsule Take 20 mg by mouth daily.  alirocumab (PRALUENT PEN) 75 mg/mL injector pen 1 mL by SubCUTAneous route Once every 2 weeks.  multivitamin (ONE A DAY) tablet Take 1 Tab by mouth daily.  coenzyme q10 (CO Q-10) 10 mg cap Take  by mouth.  cholecalciferol (VITAMIN D3) 1,000 unit tablet Take  by mouth daily.  KAITLIB FE 0.8mg-25mcg(24) and 75 mg (4) chew TAKE 1 TABLET BY MOUTH EVERY DAY     No current facility-administered medications for this visit.         Malu Bojorquez MD  [facility] [address] [place] [address] [address], [address] [facility] [address]  [facility]  [phone]

## 2018-02-28 NOTE — PATIENT INSTRUCTIONS
Please let us know what happens with your rheumatologic workup with Dr. Olvera Comes   Make sure you are fasting when you have your cholesterol labs drawn

## 2018-03-05 ENCOUNTER — TELEPHONE (OUTPATIENT)
Dept: CARDIOLOGY CLINIC | Age: 38
End: 2018-03-05

## 2018-03-05 NOTE — TELEPHONE ENCOUNTER
Taylor the pharmacist from [place] called stating she processed the PA and Praluent was still denied. She would like a return call at  [phone].  Thank you

## 2018-03-09 ENCOUNTER — TELEPHONE (OUTPATIENT)
Dept: INTERNAL MEDICINE CLINIC | Age: 38
End: 2018-03-09

## 2018-03-09 DIAGNOSIS — R79.89 ELEVATED TSH: Primary | ICD-10-CM

## 2018-03-09 NOTE — TELEPHONE ENCOUNTER
Spoke with patient , seen by Rheumatologist Dr DEL RIO [facility]. Blood work show high TSH, was advised need thyroid medication. Patient having records and labs faxed. Advised will forward for review. Pt verbalized understanding.

## 2018-03-09 NOTE — TELEPHONE ENCOUNTER
Spoke to Dr. Yogesh Lawton. Rheumatology who checked her labs and found   . Brittny BROUSSARD Breonna TSH was 62. T4 not checked. Last TSH wnl on 5/2017. Discussed the findings with Ms. Shira Patrick as she is having symptoms such as cold intolerance. We discussed the risk versus benefits of  starting levothyroxine immediately. Given that she has no symptoms of severe hypothyroidism or myxedema coma will check full thyroid panel as ordered below prior to starting levothyroxine. She has appointment on 3/14/18. If hypothyroidism is confirmed will order Levothyroxine 75mcg daily. Labs ordered and to be completed within 24hrs.    3/8/18 labs from Dr. Renea Peacock office   ALT 55 AST 62   Crt 1.4   T Trevon 2.2  TSH 62    Orders Placed This Encounter    TSH 3RD GENERATION    THYROID PANEL    THYROID ANTIBODY PANEL    METABOLIC PANEL, COMPREHENSIVE       Lab Results  Component Value Date/Time   TSH 0.795 05/18/2017 09:14 AM   T3 Uptake 22 (L) 05/18/2017 09:14 AM   T4, Total 11.3 05/18/2017 09:14 AM

## 2018-03-11 LAB
CHOLEST SERPL-MCNC: 312 MG/DL (ref 100–199)
HDLC SERPL-MCNC: 62 MG/DL
INTERPRETATION, 910389: NORMAL
LDLC SERPL CALC-MCNC: 229 MG/DL (ref 0–99)
TRIGL SERPL-MCNC: 104 MG/DL (ref 0–149)
VLDLC SERPL CALC-MCNC: 21 MG/DL (ref 5–40)

## 2018-03-12 ENCOUNTER — TELEPHONE (OUTPATIENT)
Dept: CARDIOLOGY CLINIC | Age: 38
End: 2018-03-12

## 2018-03-12 LAB
ALBUMIN SERPL-MCNC: 4.6 G/DL (ref 3.5–5.5)
ALBUMIN/GLOB SERPL: 1.5 {RATIO} (ref 1.2–2.2)
ALP SERPL-CCNC: 47 IU/L (ref 39–117)
ALT SERPL-CCNC: 56 IU/L (ref 0–32)
AST SERPL-CCNC: 68 IU/L (ref 0–40)
BILIRUB SERPL-MCNC: 1.5 MG/DL (ref 0–1.2)
BUN SERPL-MCNC: 18 MG/DL (ref 6–20)
BUN/CREAT SERPL: 12 (ref 9–23)
CALCIUM SERPL-MCNC: 9.5 MG/DL (ref 8.7–10.2)
CHLORIDE SERPL-SCNC: 99 MMOL/L (ref 96–106)
CO2 SERPL-SCNC: 25 MMOL/L (ref 18–29)
CREAT SERPL-MCNC: 1.49 MG/DL (ref 0.57–1)
FT4I SERPL CALC-MCNC: 0 (ref 1.2–4.9)
GFR SERPLBLD CREATININE-BSD FMLA CKD-EPI: 45 ML/MIN/1.73
GFR SERPLBLD CREATININE-BSD FMLA CKD-EPI: 51 ML/MIN/1.73
GLOBULIN SER CALC-MCNC: 3 G/DL (ref 1.5–4.5)
GLUCOSE SERPL-MCNC: 88 MG/DL (ref 65–99)
POTASSIUM SERPL-SCNC: 4.2 MMOL/L (ref 3.5–5.2)
PROT SERPL-MCNC: 7.6 G/DL (ref 6–8.5)
SODIUM SERPL-SCNC: 140 MMOL/L (ref 134–144)
T3RU NFR SERPL: 12 % (ref 24–39)
T4 SERPL-MCNC: 0.4 UG/DL (ref 4.5–12)
THYROGLOB AB SERPL-ACNC: <1 IU/ML (ref 0–0.9)
THYROPEROXIDASE AB SERPL-ACNC: 15 IU/ML (ref 0–34)
TSH SERPL DL<=0.005 MIU/L-ACNC: 77.13 UIU/ML (ref 0.45–4.5)

## 2018-03-12 NOTE — TELEPHONE ENCOUNTER
----- Message from Monserrat Nunes RN sent at 3/12/2018 11:31 AM EDT -----      ----- Message -----     From: Mireille Miller MD     Sent: 3/12/2018  11:29 AM       To: Monserrat Nunes RN    LDL still high but not over 290.

## 2018-03-12 NOTE — TELEPHONE ENCOUNTER
Called patient. Left a message on a self identified voicemail with the following lab results per Dr. Aroldo Norris.

## 2018-03-13 ENCOUNTER — TELEPHONE (OUTPATIENT)
Dept: INTERNAL MEDICINE CLINIC | Age: 38
End: 2018-03-13

## 2018-03-13 DIAGNOSIS — R94.6 THYROID FUNCTION TEST ABNORMAL: Primary | ICD-10-CM

## 2018-03-13 RX ORDER — LEVOTHYROXINE SODIUM 75 UG/1
75 TABLET ORAL
Qty: 30 TAB | Refills: 1 | Status: SHIPPED | OUTPATIENT
Start: 2018-03-13 | End: 2018-05-03 | Stop reason: SDUPTHER

## 2018-03-13 NOTE — TELEPHONE ENCOUNTER
Please call lab and have CK added to specimen.   Let patient know testing confirms her thyroid function is abnormal. Please order Levothyroxine 75mcg daily30 tabs r 1   We will discuss the results in detail at her appointment tomorrow

## 2018-03-13 NOTE — TELEPHONE ENCOUNTER
----- Message from Renuka York MD sent at 3/13/2018  1:54 PM EDT -----  Please call lab and have CK added to specimen.   Let patient know testing confirms her thyroid function is abnormal. Please order Levothyroxine 75mcg daily30 tabs r 1   We will discuss the results in detail at her appointment tomorrow

## 2018-03-14 ENCOUNTER — OFFICE VISIT (OUTPATIENT)
Dept: INTERNAL MEDICINE CLINIC | Age: 38
End: 2018-03-14

## 2018-03-14 ENCOUNTER — HOSPITAL ENCOUNTER (OUTPATIENT)
Dept: ULTRASOUND IMAGING | Age: 38
Discharge: HOME | End: 2018-03-14
Attending: INTERNAL MEDICINE
Payer: COMMERCIAL

## 2018-03-14 ENCOUNTER — TELEPHONE (OUTPATIENT)
Dept: INTERNAL MEDICINE CLINIC | Age: 38
End: 2018-03-14

## 2018-03-14 VITALS
DIASTOLIC BLOOD PRESSURE: 68 MMHG | HEIGHT: 63 IN | TEMPERATURE: 97.9 F | RESPIRATION RATE: 14 BRPM | OXYGEN SATURATION: 99 % | BODY MASS INDEX: 23.5 KG/M2 | HEART RATE: 76 BPM | WEIGHT: 132.6 LBS | SYSTOLIC BLOOD PRESSURE: 90 MMHG

## 2018-03-14 DIAGNOSIS — M60.89 OTHER MYOSITIS OF MULTIPLE SITES: ICD-10-CM

## 2018-03-14 DIAGNOSIS — R74.8 ELEVATED CK: ICD-10-CM

## 2018-03-14 DIAGNOSIS — R74.8 ELEVATED LIVER ENZYMES: ICD-10-CM

## 2018-03-14 DIAGNOSIS — E03.8 OTHER SPECIFIED HYPOTHYROIDISM: ICD-10-CM

## 2018-03-14 DIAGNOSIS — F33.8 SEASONAL AFFECTIVE DISORDER (HCC): ICD-10-CM

## 2018-03-14 DIAGNOSIS — E03.8 OTHER SPECIFIED HYPOTHYROIDISM: Primary | ICD-10-CM

## 2018-03-14 PROBLEM — R52 BODY ACHES: Status: RESOLVED | Noted: 2018-01-25 | Resolved: 2018-03-14

## 2018-03-14 PROCEDURE — 76536 US EXAM OF HEAD AND NECK: CPT

## 2018-03-14 NOTE — PROGRESS NOTES
HISTORY OF PRESENT ILLNESS  Felice Carmona is a 40 y.o. female. HPI  Myalgia  She has been having progressively worsening myalgia. This was initially attributed to rhabdomyolysis. She was seen by rheumatology. Recent labs show that she had a markedly elevated high TSH and CK was also elevated to the 1000s. Denies fevers or weight loss  Abnormal Liver tests  Liver function tests have been 2-3 times the upper limit of normal.  She has had an extensive evaluation for hepatology that has been unremarkable. She was referred to rheumatology for evaluation. She reports intermittent right upper quadrant pain. Fibroscan and ultrasound were both within normal limits  Cardiovascular Review:  The patient has hyperlipidemia. Followed by cardiology. Statin intolerant due to rhabdomyolysis  Diet and Lifestyle: generally follows a low fat low cholesterol diet, generally follows a low sodium diet, exercises regularly, nonsmoker  Home BP Monitoring: is not measured at home. Pertinent ROS: no TIA's, no chest pain on exertion, no dyspnea on exertion, no swelling of ankles. Hypothyroidism  Patient is seen for followup of hypothyroidism recently discovered on rheumatology panel. Full thyroid function tests were ordered and confirmed the diagnosis. She was started on levothyroxine March 13. She has several questions today regarding the diagnosis  Thyroid ROS: denies fatigue, weight changes, heat/cold intolerance, bowel/skin changes or CVS symptoms.     ROS  Per HPI  Patient Active Problem List    Diagnosis Date Noted    Familial hypercholesterolemia 02/28/2018    Rhabdomyolysis due to statin therapy 01/25/2018    Elevated liver enzymes 01/23/2018    Vitamin D insufficiency 05/26/2017    Seasonal affective disorder ([facility] [address] 01/26/2017    Depression, major, in remission ([facility] [address] 01/26/2017    Family history of renal cancer 01/26/2017       Current Outpatient Prescriptions   Medication Sig Dispense Refill    levothyroxine (SYNTHROID) 75 mcg tablet Take 1 Tab by mouth Daily (before breakfast). 30 Tab 1    multivitamin (ONE A DAY) tablet Take 1 Tab by mouth daily.  coenzyme q10 (CO Q-10) 10 mg cap Take  by mouth.  cholecalciferol (VITAMIN D3) 1,000 unit tablet Take  by mouth daily.  KAITLIB FE 0.8mg-25mcg(24) and 75 mg (4) chew TAKE 1 TABLET BY MOUTH EVERY DAY  12    omeprazole (PRILOSEC) 20 mg capsule Take 20 mg by mouth daily.  alirocumab (PRALUENT PEN) 75 mg/mL injector pen 1 mL by SubCUTAneous route Once every 2 weeks. 2 Syringe 11       Allergies   Allergen Reactions    Atorvastatin Other (comments)    Sulfa (Sulfonamide Antibiotics) Rash      Visit Vitals    BP 90/68 (BP 1 Location: Right arm, BP Patient Position: Sitting)    Pulse 76    Temp 97.9 °F (36.6 °C) (Oral)    Resp 14    Ht 5' 3\" (1.6 m)    Wt 132 lb 9.6 oz (60.1 kg)    SpO2 99%    BMI 23.49 kg/m2       Physical Exam   Constitutional: She is oriented to person, place, and time. She appears well-developed. No distress. HENT:   Nose: Right sinus exhibits no maxillary sinus tenderness and no frontal sinus tenderness. Left sinus exhibits no maxillary sinus tenderness and no frontal sinus tenderness. Mild facial edema    Eyes: Conjunctivae are normal.   Neck: Neck supple. No thyromegaly (appreciated on exam. No nodules ) present. Cardiovascular: Normal rate, regular rhythm and normal heart sounds. Pulmonary/Chest: Effort normal and breath sounds normal. No respiratory distress. She has no wheezes. She has no rales. She exhibits no tenderness. Musculoskeletal: She exhibits no edema (Ble). Neurological: She is alert and oriented to person, place, and time. Skin: Skin is warm. Dry, scaly on BLE    Psychiatric: She has a normal mood and affect.      Lab Results  Component Value Date/Time   WBC 9.3 01/23/2018 12:00 AM   HGB 13.4 01/23/2018 12:00 AM   HCT 40.0 01/23/2018 12:00 AM   PLATELET 763 58/33/7112 12:00 AM   MCV 93 01/23/2018 12:00 AM     Lab Results  Component Value Date/Time   Hemoglobin A1c 5.4 05/18/2017 09:14 AM   Glucose 88 03/10/2018 11:33 AM   LDL, calculated 229 (H) 03/10/2018 11:32 AM   Creatinine 1.49 (H) 03/10/2018 11:33 AM      Lab Results  Component Value Date/Time   Cholesterol, total 312 (H) 03/10/2018 11:32 AM   HDL Cholesterol 62 03/10/2018 11:32 AM   LDL, calculated 229 (H) 03/10/2018 11:32 AM   Triglyceride 104 03/10/2018 11:32 AM     Lab Results  Component Value Date/Time   ALT (SGPT) 56 (H) 03/10/2018 11:33 AM   AST (SGOT) 68 (H) 03/10/2018 11:33 AM   Alk. phosphatase 47 03/10/2018 11:33 AM   Bilirubin, direct 0.33 01/23/2018 12:00 AM   Bilirubin, total 1.5 (H) 03/10/2018 11:33 AM   Albumin 4.6 03/10/2018 11:33 AM   Protein, total 7.6 03/10/2018 11:33 AM   PLATELET 323 14/70/2725 12:00 AM       Lab Results  Component Value Date/Time   GFR est non-AA 45 (L) 03/10/2018 11:33 AM   GFR est AA 51 (L) 03/10/2018 11:33 AM   Creatinine 1.49 (H) 03/10/2018 11:33 AM   BUN 18 03/10/2018 11:33 AM   Sodium 140 03/10/2018 11:33 AM   Potassium 4.2 03/10/2018 11:33 AM   Chloride 99 03/10/2018 11:33 AM   CO2 25 03/10/2018 11:33 AM   Magnesium 2.2 02/21/2018 08:14 AM     Lab Results  Component Value Date/Time   TSH 77.130 (H) 03/10/2018 11:33 AM   T3 Uptake 12 (L) 03/10/2018 11:33 AM   T4, Total 0.4 (LL) 03/10/2018 11:33 AM      Lab Results   Component Value Date/Time    Glucose 88 03/10/2018 11:33 AM       ASSESSMENT and PLAN  Diagnoses and all orders for this visit:    1. Other specified hypothyroidism- started on 75mcg levothyroxine. Will refer to Endocrinology given severity. May titrate to 100mcg (max weight based dose)  in interim if there is no improvement in symptoms within next few weeks. -     REFERRAL TO ENDOCRINOLOGY  -     US THYROID/PARATHYROID/SOFT TISS; Future    2. Elevated liver enzymes-stable. Continue to monitor. Likely due to associated myositis given evaluation today.     3. Elevated CK- decreasing from 2/2018--> 3/10/18 labs. Kassi Britton is staying hydrated. 4. Other myositis of multiple sites    5. Seasonal affective disorder ([address]- symptoms have been exacerbated by recent illness. Hold medications at this time given lab abnormalities. No SI/HI reported. Follow-up Disposition:  Return in about 3 months (around 6/14/2018) for Follow-up. Medication risks/benefits/costs/interactions/alternatives discussed with patient. Kassi Britton  was given an after visit summary which includes diagnoses, current medications, & vitals. she expressed understanding with the diagnosis and plan.

## 2018-03-14 NOTE — PROGRESS NOTES
Chief Complaint   Patient presents with    Thyroid Problem     1. Have you been to the ER, urgent care clinic since your last visit? Hospitalized since your last visit? No    2. Have you seen or consulted any other health care providers outside of the [address] [place] [address] since your last visit? Include any pap smears or colon screening.  Jing Lawton/Rheumatologist

## 2018-03-14 NOTE — PATIENT INSTRUCTIONS
It was a pleasure to see you! As discussed:    I have ordered an ultrasound and an Endocrinology consult. Please schedule both. Take your supplements at night (separate from the levothyroxine)     Hypothyroidism: Care Instructions  Your Care Instructions    You have hypothyroidism, which means that your body is not making enough thyroid hormone. This hormone helps your body use energy. If your thyroid level is low, you may feel tired, be constipated, have an increase in your blood pressure, or have dry skin or memory problems. You may also get cold easily, even when it is warm. Women with low thyroid levels may have heavy menstrual periods. A blood test to find your thyroid-stimulating hormone (TSH) level is used to check for hypothyroidism. A high TSH level may mean that you have low thyroid. When your body is not making enough thyroid hormone, TSH levels rise in an effort to make the body produce more. The treatment for hypothyroidism is to take thyroid hormone pills. You should start to feel better in 1 to 2 weeks. But it can take several months to see changes in the TSH level. You will need regular visits with your doctor to make sure you have the right dose of medicine. Most people need treatment for the rest of their lives. You will need to see your doctor regularly to have blood tests and to make sure you are doing well. Follow-up care is a key part of your treatment and safety. Be sure to make and go to all appointments, and call your doctor if you are having problems. It's also a good idea to know your test results and keep a list of the medicines you take. How can you care for yourself at home? · Take your thyroid hormone medicine exactly as prescribed. Call your doctor if you think you are having a problem with your medicine. Most people do not have side effects if they take the right amount of medicine regularly.   ¨ Take the medicine 30 minutes before breakfast, and do not take it with calcium, vitamins, or iron. ¨ Do not take extra doses of your thyroid medicine. It will not help you get better any faster, and it may cause side effects. ¨ If you forget to take a dose, do NOT take a double dose of medicine. Take your usual dose the next day. · Tell your doctor about all prescription, herbal, or over-the-counter products you take. · Take care of yourself. Eat a healthy diet, get enough sleep, and get regular exercise. When should you call for help? Call 911 anytime you think you may need emergency care. For example, call if:  ? · You passed out (lost consciousness). ? · You have severe trouble breathing. ? · You have a very slow heartbeat (less than 60 beats a minute). ? · You have a low body temperature (95°F or below). ?Call your doctor now or seek immediate medical care if:  ? · You feel tired, sluggish, or weak. ? · You have trouble remembering things or concentrating. ? · You do not begin to feel better 2 weeks after starting your medicine. ? Watch closely for changes in your health, and be sure to contact your doctor if you have any problems. Where can you learn more? Go to http://cristina-mily.info/. Enter K063 in the search box to learn more about \"Hypothyroidism: Care Instructions. \"  Current as of: May 12, 2017  Content Version: 11.4  © 7271-3502 [employer]. Care instructions adapted under license by [employer] (which disclaims liability or warranty for this information). If you have questions about a medical condition or this instruction, always ask your healthcare professional. [address] any warranty or liability for your use of this information.

## 2018-03-14 NOTE — MR AVS SNAPSHOT
[address] [facility] [address] [place] 
[phone] Patient: Mariaelena Partida MRN: [id number] DGW:[id number] Visit Information Date & Time Provider Department Dept. Phone Encounter #  
 3/14/2018 10:30 AM Davis Choe MD [address] Internal Medicine [phone] [id number] Follow-up Instructions Return in about 3 months (around 6/14/2018) for Follow-up. Your Appointments 5/3/2018  9:30 AM  
ROUTINE CARE with Davis Choe MD  
[address] Internal Medicine [facility]) Appt Note: f/u [address]  [address] [facility] [address]  
[address] [facility]  
  
    
 5/24/2018 10:00 AM  
Follow Up with DEION [address] ([facility]) Appt Note: Follow up [address] [facility] [phone] [facility] [address]  
[phone]  
  
   
 [address] [address]  
  
    
 5/31/2018  9:00 AM  
PHYSICAL with Davis Choe MD  
[address] [facility] [address] Internal [facility]) Appt Note: [address] [facility] [address] [facility] [address]  
[address] [address] [facility] [address] [place]  
  
    
 6/1/2018  9:20 AM  
ESTABLISHED PATIENT with Idris Galan MD  
[facility] ([facility]) Appt Note: 3 mo f/u per Dr. Chantal Hendricks [address]  [address] [place] [address]  
[address],[facility] [address] [address] Upcoming Health Maintenance Date Due  
 PAP AKA CERVICAL CYTOLOGY 6/14/2018 DTaP/Tdap/Td series (2 - Td) 1/1/2024 Allergies as of 3/14/2018  Review Complete On: 3/14/2018 By: Manan Urias LPN Severity Noted Reaction Type Reactions Atorvastatin  01/23/2018    Other (comments) Sulfa (Sulfonamide Antibiotics)  01/26/2017    Rash Current Immunizations  Reviewed on 5/9/2017 Name Date Influenza Vaccine (Quad) PF 9/29/2017 Not reviewed this visit You Were Diagnosed With   
  
 Codes Comments Other specified hypothyroidism    -  Primary ICD-10-CM: E03.8 ICD-9-CM: 244.8 Elevated liver enzymes     ICD-10-CM: R74.8 ICD-9-CM: 790.5 Elevated CK     ICD-10-CM: R74.8 ICD-9-CM: 790.5 Other myositis of multiple sites     ICD-10-CM: M60.89 ICD-9-CM: 729.1 Seasonal affective disorder ([facility] [address]     ICD-10-CM: F33.9 ICD-9-CM: 296.99 Vitals BP Pulse Temp Resp Height(growth percentile) Weight(growth percentile) 90/68 (BP 1 Location: Right arm, BP Patient Position: Sitting) 76 97.9 °F (36.6 °C) (Oral) 14 5' 3\" (1.6 m) 132 lb 9.6 oz (60.1 kg) SpO2 BMI OB Status Smoking Status 99% 23.49 kg/m2 Medically Induced Never Smoker Vitals History BMI and BSA Data Body Mass Index Body Surface Area  
 23.49 kg/m 2 1.63 m 2 Preferred Pharmacy Pharmacy Name Phone [id number] [phone] Your Updated Medication List  
  
   
This list is accurate as of 3/14/18 11:30 AM.  Always use your most recent med list.  
  
  
  
  
 alirocumab 75 mg/mL injector pen Commonly known as:  PRALUENT PEN  
1 mL by SubCUTAneous route Once every 2 weeks. CO Q-10 10 mg Cap Generic drug:  coenzyme q10 Take  by mouth. KAITLIB FE 0.8mg-25mcg(24) and 75 mg (4) Chew Generic drug:  noreth-ethinyl estradiol-iron TAKE 1 TABLET BY MOUTH EVERY DAY  
  
 levothyroxine 75 mcg tablet Commonly known as:  SYNTHROID Take 1 Tab by mouth Daily (before breakfast). multivitamin tablet Commonly known as:  ONE A DAY Take 1 Tab by mouth daily. omeprazole 20 mg capsule Commonly known as:  PRILOSEC Take 20 mg by mouth daily. VITAMIN D3 1,000 unit tablet Generic drug:  cholecalciferol Take  by mouth daily. We Performed the Following REFERRAL TO ENDOCRINOLOGY [ZJV16 Custom] Follow-up Instructions Return in about 3 months (around 6/14/2018) for Follow-up. To-Do List   
 03/14/2018 Imaging:  US THYROID/PARATHYROID/SOFT TISS Referral Information Referral ID Referred By Referred To  
  
 [id number] Mari Claros, [address] [facility] [address] [place] [address] [address] Phone: [phone] Fax: [phone] Visits Status Start Date End Date 1 New Request 3/14/18 3/14/19 If your referral has a status of pending review or denied, additional information will be sent to support the outcome of this decision. Patient Instructions It was a pleasure to see you! As discussed: 
 
I have ordered an ultrasound and an Endocrinology consult. Please schedule both. Take your supplements at night (separate from the levothyroxine) Hypothyroidism: Care Instructions Your Care Instructions You have hypothyroidism, which means that your body is not making enough thyroid hormone. This hormone helps your body use energy. If your thyroid level is low, you may feel tired, be constipated, have an increase in your blood pressure, or have dry skin or memory problems. You may also get cold easily, even when it is warm. Women with low thyroid levels may have heavy menstrual periods. A blood test to find your thyroid-stimulating hormone (TSH) level is used to check for hypothyroidism. A high TSH level may mean that you have low thyroid. When your body is not making enough thyroid hormone, TSH levels rise in an effort to make the body produce more. The treatment for hypothyroidism is to take thyroid hormone pills. You should start to feel better in 1 to 2 weeks. But it can take several months to see changes in the TSH level. You will need regular visits with your doctor to make sure you have the right dose of medicine. Most people need treatment for the rest of their lives. You will need to see your doctor regularly to have blood tests and to make sure you are doing well. Follow-up care is a key part of your treatment and safety. Be sure to make and go to all appointments, and call your doctor if you are having problems. It's also a good idea to know your test results and keep a list of the medicines you take. How can you care for yourself at home? · Take your thyroid hormone medicine exactly as prescribed. Call your doctor if you think you are having a problem with your medicine. Most people do not have side effects if they take the right amount of medicine regularly. ¨ Take the medicine 30 minutes before breakfast, and do not take it with calcium, vitamins, or iron. ¨ Do not take extra doses of your thyroid medicine. It will not help you get better any faster, and it may cause side effects. ¨ If you forget to take a dose, do NOT take a double dose of medicine. Take your usual dose the next day. · Tell your doctor about all prescription, herbal, or over-the-counter products you take. · Take care of yourself. Eat a healthy diet, get enough sleep, and get regular exercise. When should you call for help? Call 911 anytime you think you may need emergency care. For example, call if: 
? · You passed out (lost consciousness). ? · You have severe trouble breathing. ? · You have a very slow heartbeat (less than 60 beats a minute). ? · You have a low body temperature (95°F or below). ?Call your doctor now or seek immediate medical care if: 
? · You feel tired, sluggish, or weak. ? · You have trouble remembering things or concentrating. ? · You do not begin to feel better 2 weeks after starting your medicine. ? Watch closely for changes in your health, and be sure to contact your doctor if you have any problems. Where can you learn more? Go to http://cristina-mily.info/. Enter V772 in the search box to learn more about \"Hypothyroidism: Care Instructions. \" Current as of: May 12, 2017 Content Version: 11.4 © 1636-4728 [employer]. Care instructions adapted under license by [employer] (which disclaims liability or warranty for this information). If you have questions about a medical condition or this instruction, always ask your healthcare professional. [address] any warranty or liability for your use of this information. Introducing [facility]! Dear Paula Santillan: Thank you for requesting a I[employer] account. Our records indicate that you already have an active IPLocks account. You can access your account anytime at [email or web address]. [employer] Did you know that you can access your hospital and ER discharge instructions at any time in IPLocks? You can also review all of your test results from your hospital stay or ER visit. Additional Information If you have questions, please visit the Frequently Asked Questions section of the IPLocks website at [email or web address] ([email or web address]/. Remember, IPLocks is NOT to be used for urgent needs. For medical emergencies, dial 911. Now available from your iPhone and Android! Please provide this summary of care documentation to your next provider. Your primary care clinician is listed as Murali Delgado. If you have any questions after today's visit, please call [phone].

## 2018-03-14 NOTE — TELEPHONE ENCOUNTER
Patient was seen by Dr. Ariadna Cm today. They talked about a letter she needs for work, explaining her condition, but she forgot to take it with her. Asked if the nurse could call so they can talk about it.

## 2018-03-15 NOTE — TELEPHONE ENCOUNTER
Spoke with patient today to confirm if able to download letter from [address] Patient confirmed. Pt requested hard  copy at . Pt verbalized understanding.

## 2018-03-16 LAB
CK SERPL-CCNC: 1086 U/L (ref 24–173)
SPECIMEN STATUS REPORT, ROLRST: NORMAL

## 2018-03-19 NOTE — PROGRESS NOTES
Hi Ms. Abigail Phelps news! Your thyroid ultrasound does not show any significant abnormality. The finding noted is typically a normal variant. Continue the plan to take the levothyroxine and see the endocrinologist as we discussed. Do not hesitate to contact the office if you have any questions or concerns before your next appointment.    Kind regards,   Dr. Encinas Brain

## 2018-04-04 ENCOUNTER — OFFICE VISIT (OUTPATIENT)
Dept: INTERNAL MEDICINE CLINIC | Age: 38
End: 2018-04-04

## 2018-04-04 VITALS
TEMPERATURE: 98 F | HEART RATE: 81 BPM | HEIGHT: 63 IN | WEIGHT: 126.6 LBS | SYSTOLIC BLOOD PRESSURE: 100 MMHG | BODY MASS INDEX: 22.43 KG/M2 | RESPIRATION RATE: 16 BRPM | DIASTOLIC BLOOD PRESSURE: 80 MMHG | OXYGEN SATURATION: 99 %

## 2018-04-04 DIAGNOSIS — R31.9 HEMATURIA, UNSPECIFIED TYPE: Primary | ICD-10-CM

## 2018-04-04 DIAGNOSIS — R30.0 DYSURIA: ICD-10-CM

## 2018-04-04 LAB
BILIRUB UR QL STRIP: NEGATIVE
GLUCOSE UR-MCNC: NEGATIVE MG/DL
KETONES P FAST UR STRIP-MCNC: NEGATIVE MG/DL
PH UR STRIP: 6 [PH] (ref 4.6–8)
PROT UR QL STRIP: NORMAL
SP GR UR STRIP: 1.02 (ref 1–1.03)
UA UROBILINOGEN AMB POC: NORMAL (ref 0.2–1)
URINALYSIS CLARITY POC: NORMAL
URINALYSIS COLOR POC: NORMAL
URINE BLOOD POC: NORMAL
URINE LEUKOCYTES POC: NORMAL
URINE NITRITES POC: NEGATIVE

## 2018-04-04 RX ORDER — CEFUROXIME AXETIL 500 MG/1
500 TABLET ORAL 2 TIMES DAILY
Qty: 10 TAB | Refills: 0 | Status: SHIPPED | OUTPATIENT
Start: 2018-04-04 | End: 2018-04-09

## 2018-04-04 NOTE — PROGRESS NOTES
Acute Care Note    Nelia Santamaria is 40 y.o. female. she presents for evaluation of Bladder Infection      The patient comes for complaints of hematuria which she noted beginning in the early morning hours of today. At that time, she also developed some dysuria frequency and urgency to urinate. She denies fever or chills. She has had no nausea. She reports some mild lower back discomfort as well. She tells me that in 2016, she was given a prescription for Augmentin to take after intercourse as she was having repeated UTIs at that time. She has not been taking this recently and questions whether or not she should continue to do so. Prior to Admission medications    Medication Sig Start Date End Date Taking? Authorizing Provider   levothyroxine (SYNTHROID) 75 mcg tablet Take 1 Tab by mouth Daily (before breakfast). 3/13/18  Yes Aram Howell MD   multivitamin (ONE A DAY) tablet Take 1 Tab by mouth daily. Yes Historical Provider   coenzyme q10 (CO Q-10) 10 mg cap Take  by mouth. Yes Historical Provider   cholecalciferol (VITAMIN D3) 1,000 unit tablet Take  by mouth daily. Yes Historical Provider   KAITLIB FE 0.8mg-25mcg(24) and 75 mg (4) chew TAKE 1 TABLET BY MOUTH EVERY DAY 5/9/17  Yes Historical Provider   omeprazole (PRILOSEC) 20 mg capsule Take 20 mg by mouth daily. Historical Provider   alirocumab (PRALUENT PEN) 75 mg/mL injector pen 1 mL by SubCUTAneous route Once every 2 weeks. 2/28/18   Rashaun Henson NP         Patient Active Problem List   Diagnosis Code    Seasonal affective disorder ([facility] [address] F33.9    Depression, major, in remission ([facility] [address] F32.5    Family history of renal cancer Z80.51    Vitamin D insufficiency E55.9    Elevated liver enzymes R74.8    Rhabdomyolysis due to statin therapy M62.82    Familial hypercholesterolemia E78.01         Review of Systems   Constitutional: Negative. Respiratory: Negative. Cardiovascular: Negative. Gastrointestinal: Negative. Genitourinary: Positive for dysuria, frequency, hematuria and urgency. Negative for flank pain. Visit Vitals    /80 (BP 1 Location: Right arm, BP Patient Position: Sitting)    Pulse 81    Temp 98 °F (36.7 °C) (Oral)    Resp 16    Ht 5' 3\" (1.6 m)    Wt 126 lb 9.6 oz (57.4 kg)    SpO2 99%    BMI 22.43 kg/m2       Physical Exam   Constitutional: No distress. Abdominal:   Very mild CVA tenderness       Results for orders placed or performed in visit on 04/04/18   AMB POC URINALYSIS DIP STICK AUTO W/O MICRO     Status: None   Result Value Ref Range Status    Color (UA POC) Shannan  Final    Clarity (UA POC) Cloudy  Final    Glucose (UA POC) Negative Negative Final    Bilirubin (UA POC) Negative Negative Final    Ketones (UA POC) Negative Negative Final    Specific gravity (UA POC) 1.020 1.001 - 1.035 Final    Blood (UA POC) 3+ Negative Final    pH (UA POC) 6.0 4.6 - 8.0 Final    Protein (UA POC) 3+ Negative Final    Urobilinogen (UA POC) 0.2 mg/dL 0.2 - 1 Final    Nitrites (UA POC) Negative Negative Final    Leukocyte esterase (UA POC) 3+ Negative Final       ASSESSMENT/PLAN  Diagnoses and all orders for this visit:    1. Hematuria, unspecified type  -     AMB POC URINALYSIS DIP STICK AUTO W/O MICRO  -     CULTURE, URINE    2. Dysuria -given the urinalysis result, this appears to be a UTI. We will await the culture. I advised the patient to continue the antibiotics ordered and to have a further discussion regarding post coital antibiotic with her primary care provider  -     cefUROXime (CEFTIN) 500 mg tablet; Take 1 Tab by mouth two (2) times a day for 5 days. Advised the patient to call back or return to office if symptoms worsen/change/persist.   Discussed expected course/resolution/complications of diagnosis in detail with patient. Medication risks/benefits/costs/interactions/alternatives discussed with patient.    The patient was given an after visit summary which includes diagnoses, current medications, & vitals. They expressed understanding with the diagnosis and plan.

## 2018-04-06 LAB — BACTERIA UR CULT: NORMAL

## 2018-04-18 DIAGNOSIS — E03.9 ACQUIRED HYPOTHYROIDISM: Primary | ICD-10-CM

## 2018-05-02 ENCOUNTER — OFFICE VISIT (OUTPATIENT)
Dept: INTERNAL MEDICINE CLINIC | Age: 38
End: 2018-05-02

## 2018-05-02 VITALS
HEART RATE: 89 BPM | WEIGHT: 126 LBS | DIASTOLIC BLOOD PRESSURE: 62 MMHG | BODY MASS INDEX: 22.32 KG/M2 | TEMPERATURE: 99.2 F | OXYGEN SATURATION: 99 % | RESPIRATION RATE: 16 BRPM | HEIGHT: 63 IN | SYSTOLIC BLOOD PRESSURE: 102 MMHG

## 2018-05-02 DIAGNOSIS — E03.9 ACQUIRED HYPOTHYROIDISM: Primary | ICD-10-CM

## 2018-05-02 DIAGNOSIS — E78.01 FAMILIAL HYPERCHOLESTEROLEMIA: ICD-10-CM

## 2018-05-02 DIAGNOSIS — M60.88 OTHER MYOSITIS OF OTHER SITE: ICD-10-CM

## 2018-05-02 DIAGNOSIS — R74.8 ELEVATED LIVER ENZYMES: ICD-10-CM

## 2018-05-02 DIAGNOSIS — F32.5 DEPRESSION, MAJOR, IN REMISSION (HCC): ICD-10-CM

## 2018-05-02 NOTE — PROGRESS NOTES
HISTORY OF PRESENT ILLNESS  Jaylon Angulo is a 40 y.o. female. HPI  Depression  Patient is seen for followup of depression. Previous Treatment includes Zoloft and no other therapies. she denies suicidal thoughts with specific plan. she experiences the following side effects from the treatment: n/a . Hypothyroidism  Patient is seen for followup of hypothyroidism. Thyroid ROS: + Myalgias; denies fatigue, weight changes, heat/cold intolerance, bowel/skin changes or CVS symptoms. Cardiovascular Review  The patient has hyperlipidemia. Diet and Lifestyle: nonsmoker  Home BP Monitoring: is not measured at home. Pertinent ROS: no TIA's, no chest pain on exertion, no dyspnea on exertion, no swelling of ankles. Review of Systems   Constitutional: Negative for fever and malaise/fatigue. Cardiovascular: Negative for chest pain and palpitations. Musculoskeletal: Positive for myalgias (improved). Skin: Negative for rash. Psychiatric/Behavioral: Positive for depression. Patient Active Problem List    Diagnosis Date Noted    Familial hypercholesterolemia 02/28/2018    Rhabdomyolysis due to statin therapy 01/25/2018    Elevated liver enzymes 01/23/2018    Vitamin D insufficiency 05/26/2017    Seasonal affective disorder ([facility] [address] 01/26/2017    Depression, major, in remission ([facility] [address] 01/26/2017    Family history of renal cancer 01/26/2017       Current Outpatient Prescriptions   Medication Sig Dispense Refill    levothyroxine (SYNTHROID) 75 mcg tablet Take 1 Tab by mouth Daily (before breakfast). 30 Tab 1    multivitamin (ONE A DAY) tablet Take 1 Tab by mouth daily.  coenzyme q10 (CO Q-10) 10 mg cap Take  by mouth.  cholecalciferol (VITAMIN D3) 1,000 unit tablet Take  by mouth daily.  KAITLIB FE 0.8mg-25mcg(24) and 75 mg (4) chew TAKE 1 TABLET BY MOUTH EVERY DAY  12    omeprazole (PRILOSEC) 20 mg capsule Take 20 mg by mouth daily.       alirocumab (PRALUENT PEN) 75 mg/mL injector pen 1 mL by SubCUTAneous route Once every 2 weeks. 2 Syringe 11       Allergies   Allergen Reactions    Atorvastatin Other (comments)    Sulfa (Sulfonamide Antibiotics) Rash      Visit Vitals    /62 (BP 1 Location: Right arm, BP Patient Position: Sitting)    Pulse 89    Temp 99.2 °F (37.3 °C) (Oral)    Resp 16    Ht 5' 3\" (1.6 m)    Wt 126 lb (57.2 kg)    SpO2 99%    BMI 22.32 kg/m2       Physical Exam   Constitutional: She is oriented to person, place, and time. No distress. Cardiovascular: Normal rate and regular rhythm. Pulmonary/Chest: Breath sounds normal. No respiratory distress. She has no wheezes. She has no rales. Musculoskeletal: She exhibits no edema. Neurological: She is alert and oriented to person, place, and time. She has normal strength. Psychiatric: She has a normal mood and affect. ASSESSMENT and PLAN    Diagnoses and all orders for this visit:    1. Acquired hypothyroidism- check TFTs if improving will lift work restrictions- return to full duty as tolerated. Has upcoming endocrinology appointment.   -     TSH 3RD GENERATION  -     THYROID PANEL    2. Depression, major, (HCC)-recurrent no suicidal or homicidal ideation previously on zoloft. Reinitiation was held due to her transaminitis and acute medical illness. However if her LFTs have now normalized we will restart Zoloft at low-dose 50 mg and titrate as tolerated. 3. Familial hypercholesterolemia- had rhabdomyolysis with statin. her uncontrolled thyroid disease may have also been contributory to her symptoms. Myositis labs and thyroid studies normalize will recheck her lipids and institute therapy as indicated. 4. Elevated liver enzymes-has been improving. Hepatology workup today has been within normal limits.  -     METABOLIC PANEL, COMPREHENSIVE    5. Other myositis of other site-significantly improved.   -     CK      Follow-up Disposition:  Return if symptoms worsen or fail to improve before scheduled appointment. Medication risks/benefits/costs/interactions/alternatives discussed with patient. Lilly Torres  was given an after visit summary which includes diagnoses, current medications, & vitals. she expressed understanding with the diagnosis and plan.

## 2018-05-02 NOTE — PROGRESS NOTES
Chief Complaint   Patient presents with    Form Completion     1. Have you been to the ER, urgent care clinic since your last visit? Hospitalized since your last visit? No    2. Have you seen or consulted any other health care providers outside of the [facility] since your last visit? Include any pap smears or colon screening.  No

## 2018-05-03 DIAGNOSIS — R94.6 THYROID FUNCTION TEST ABNORMAL: ICD-10-CM

## 2018-05-03 LAB
ALBUMIN SERPL-MCNC: 4.7 G/DL (ref 3.5–5.5)
ALBUMIN/GLOB SERPL: 1.8 {RATIO} (ref 1.2–2.2)
ALP SERPL-CCNC: 49 IU/L (ref 39–117)
ALT SERPL-CCNC: 17 IU/L (ref 0–32)
AST SERPL-CCNC: 19 IU/L (ref 0–40)
BILIRUB SERPL-MCNC: 1.6 MG/DL (ref 0–1.2)
BUN SERPL-MCNC: 12 MG/DL (ref 6–20)
BUN/CREAT SERPL: 13 (ref 9–23)
CALCIUM SERPL-MCNC: 9.2 MG/DL (ref 8.7–10.2)
CHLORIDE SERPL-SCNC: 98 MMOL/L (ref 96–106)
CK SERPL-CCNC: 109 U/L (ref 24–173)
CO2 SERPL-SCNC: 25 MMOL/L (ref 18–29)
CREAT SERPL-MCNC: 0.9 MG/DL (ref 0.57–1)
FT4I SERPL CALC-MCNC: 2 (ref 1.2–4.9)
GFR SERPLBLD CREATININE-BSD FMLA CKD-EPI: 82 ML/MIN/1.73
GFR SERPLBLD CREATININE-BSD FMLA CKD-EPI: 94 ML/MIN/1.73
GLOBULIN SER CALC-MCNC: 2.6 G/DL (ref 1.5–4.5)
GLUCOSE SERPL-MCNC: 74 MG/DL (ref 65–99)
POTASSIUM SERPL-SCNC: 4 MMOL/L (ref 3.5–5.2)
PROT SERPL-MCNC: 7.3 G/DL (ref 6–8.5)
SODIUM SERPL-SCNC: 138 MMOL/L (ref 134–144)
T3RU NFR SERPL: 22 % (ref 24–39)
T4 SERPL-MCNC: 9.2 UG/DL (ref 4.5–12)
TSH SERPL DL<=0.005 MIU/L-ACNC: 18.8 UIU/ML (ref 0.45–4.5)

## 2018-05-03 RX ORDER — LEVOTHYROXINE SODIUM 75 UG/1
TABLET ORAL
Qty: 30 TAB | Refills: 1 | Status: SHIPPED | OUTPATIENT
Start: 2018-05-03 | End: 2018-05-15 | Stop reason: DRUGHIGH

## 2018-05-04 ENCOUNTER — TELEPHONE (OUTPATIENT)
Dept: INTERNAL MEDICINE CLINIC | Age: 38
End: 2018-05-04

## 2018-05-04 DIAGNOSIS — F32.89 OTHER DEPRESSION: Primary | ICD-10-CM

## 2018-05-04 RX ORDER — SERTRALINE HYDROCHLORIDE 50 MG/1
50 TABLET, FILM COATED ORAL DAILY
Qty: 60 TAB | Refills: 2 | Status: SHIPPED | OUTPATIENT
Start: 2018-05-04 | End: 2018-10-28 | Stop reason: SDUPTHER

## 2018-05-04 NOTE — PROGRESS NOTES
Hi Ms. Nicholas Acuna,  It was a pleasure to speak to you on May 4. As discussed: Your labs show significant improvement. Specifically your kidney and liver function are now within normal limits. As such we will restart your Zoloft as discussed. Your muscle breakdown product, CK, has normalized. Therefore you can resume full duty work. Your thyroid function has improved but is still not at goal.  When you see Dr. Meryl Gaxiola on May 15 she will work with you to adjust the dose as indicated. We will moved your follow-up appointment with Dr. Criss Baca to 6 months when we recheck your cholesterol levels as your thyroid disease may have caused these levels to be elevated. Given the normalization of your liver function we can hold your appointment with Dr. Josefa Waldrop for right now. As requested a return to work note will be mailed to your home. Do not hesitate to contact the office if you have any questions or concerns before your next appointment.    Kind regards,   Dr. Kameron Daniel

## 2018-05-04 NOTE — TELEPHONE ENCOUNTER
Hi Ms. Maria Paredes,  It was a pleasure to speak to you on May 4. As discussed: Your labs show significant improvement. Specifically your kidney and liver function are now within normal limits. As such we will restart your Zoloft as discussed. Your muscle breakdown product, CK, has normalized. Therefore you can resume full duty work. Your thyroid function has improved but is still not at goal.  When you see Dr. Bessie Royal on May 15 she will work with you to adjust the dose as indicated. We will moved your follow-up appointment with Dr. Padilla Jara to 6 months when we recheck your cholesterol levels as your thyroid disease may have caused these levels to be elevated. Given the normalization of your liver function we can hold your appointment with Dr. Alexander Daley for right now. As requested a return to work note will be mailed to your home. Do not hesitate to contact the office if you have any questions or concerns before your next appointment.    Kind regards,   Dr. Stefan Avendaño

## 2018-05-15 ENCOUNTER — OFFICE VISIT (OUTPATIENT)
Dept: ENDOCRINOLOGY | Age: 38
End: 2018-05-15

## 2018-05-15 VITALS
WEIGHT: 123.8 LBS | BODY MASS INDEX: 21.93 KG/M2 | DIASTOLIC BLOOD PRESSURE: 77 MMHG | HEART RATE: 68 BPM | SYSTOLIC BLOOD PRESSURE: 103 MMHG | HEIGHT: 63 IN

## 2018-05-15 DIAGNOSIS — E03.9 ACQUIRED HYPOTHYROIDISM: Primary | ICD-10-CM

## 2018-05-15 DIAGNOSIS — E78.01 FAMILIAL HYPERCHOLESTEROLEMIA: ICD-10-CM

## 2018-05-15 RX ORDER — LEVOTHYROXINE SODIUM 112 UG/1
112 TABLET ORAL
Qty: 30 TAB | Refills: 5 | Status: SHIPPED | OUTPATIENT
Start: 2018-05-15 | End: 2018-09-13 | Stop reason: SDUPTHER

## 2018-05-15 NOTE — PROGRESS NOTES
Endocrinology Visit    CC: hypothyroidism    Referring provider: Mohan Ray MD     History of present illness:  Ezequiel Dickerson is a 40 y.o. female here for recently diagnosed hypothyroidism. TSH was elevated to 77 in March. She had symptoms including exhaustion, muscle weakness, cold intolerance, 10 lb weight gain, and generalized edema in the setting of recently diagnosed statin-induced CK elevation (possibly rhabdomyolysis). She was started on levothyroxine 75 mcg daily on 3/13/18 and reports resolution of her hypothyroid symptoms with in a few weeks of starting the medication. Most recent TSH returned improved but still elevated at 18 in early May. She also started Zoloft which she feels may have affected the way she feels - overall she feels much better than she did a few months ago. She currently takes generic levothyroxine 75 mcg daily. She takes this correctly on an empty stomach, first thing in the morning waiting 30-60 minutes for food and coffee. She denies racing heart, tremor, palpitations, weight changes, heat or cold intolerance, or changes to her bowel habits. Cycles are somewhat regular on OCPs. Menarche was around age 15 and cycles were previously regular. She is currently on OCPs (24d active/4d placebo) but did not have a cycle for years until recently. She has a h/o familial hypercholesterolemia (LDL>200) and is f/b Dr Tiesha Russell, considering non-statin therapies including Repatha.      ROS: see HPI for pertinent positives and negatives, otherwise a 10 system review is negative    Problem list:  Patient Active Problem List   Diagnosis Code    Seasonal affective disorder ([facility] [address] F33.9    Depression, major, in remission ([facility] [address] F32.5    Family history of renal cancer Z80.51    Vitamin D insufficiency E55.9    Elevated liver enzymes R74.8    Rhabdomyolysis due to statin therapy M62.82    Familial hypercholesterolemia E78.01       Medical history:  Past Medical History:   Diagnosis Date    Hiatal hernia     Rhabdomyolysis due to statin therapy 12/2017    After atorvastatin 40 mg     Vitamin D deficiency        Past surgical history:  No past surgical history on file. Medications:    Current Outpatient Prescriptions:     sertraline (ZOLOFT) 50 mg tablet, Take 1 Tab by mouth daily. , Disp: 60 Tab, Rfl: 2    levothyroxine (SYNTHROID) 75 mcg tablet, TAKE 1 TAB BY MOUTH DAILY (BEFORE BREAKFAST). , Disp: 30 Tab, Rfl: 1    omeprazole (PRILOSEC) 20 mg capsule, Take 20 mg by mouth daily. , Disp: , Rfl:     alirocumab (PRALUENT PEN) 75 mg/mL injector pen, 1 mL by SubCUTAneous route Once every 2 weeks. , Disp: 2 Syringe, Rfl: 11    multivitamin (ONE A DAY) tablet, Take 1 Tab by mouth daily. , Disp: , Rfl:     coenzyme q10 (CO Q-10) 10 mg cap, Take  by mouth., Disp: , Rfl:     cholecalciferol (VITAMIN D3) 1,000 unit tablet, Take  by mouth daily. , Disp: , Rfl:     KAITLIB FE 0.8mg-25mcg(24) and 75 mg (4) chew, TAKE 1 TABLET BY MOUTH EVERY DAY, Disp: , Rfl: 12    Allergies: Allergies   Allergen Reactions    Atorvastatin Other (comments)    Sulfa (Sulfonamide Antibiotics) Rash       Social History:  Social History     Social History    Marital status: SINGLE     Spouse name: N/A    Number of children: N/A    Years of education: N/A     Occupational History    Not on file.      Social History Main Topics    Smoking status: Never Smoker    Smokeless tobacco: Never Used    Alcohol use Yes      Comment: rare    Drug use: No    Sexual activity: Yes     Birth control/ protection: Pill     Other Topics Concern    Not on file     Social History Narrative       Family History:  Family History   Problem Relation Age of Onset    Elevated Lipids Father     Stroke Maternal Grandfather     Heart Attack Paternal Grandmother     Heart Attack Paternal Grandfather     Heart Attack Paternal Aunt     Cancer Mother      renal cell carcinoma    No Known Problems Brother        Physical Exam:  Visit Vitals    /77 (BP 1 Location: Left arm, BP Patient Position: Sitting)    Pulse 68    Ht 5' 3\" (1.6 m)    Wt 123 lb 12.8 oz (56.2 kg)    BMI 21.93 kg/m2      Gen: NAD  Heent: mucous membranes moist, no lid lag, stare or exophthalmos. No scleral or conjunctival injection. Extra ocular muscles intact . Thyroid: moves well with swallowing, normal size, normal texture, no thyroid bruit, negative pembertons sign, no masses appreciated  Heme/lymph: no cervical, supraclavicular or submandibular lymphadenopathy is appreciated. Pulmonary: clear to auscultation bilaterally, no wheezes/rhonchi or rales  Cardiovascular: regular rate and rhythm, no murmurs, rubs or gallops, good distal pulses  GI: soft non tender, nondistended, normal bowel sounds  Extremities: no clubbing, cyanosis or edema. No onocholysis   Neuro: no tremor of the outstretch hands, reflexes are normal with normal relaxation phase.  Normal gait, normal concentration  Skin: normal texture, warm and dry, facial hirsutism noted  Psyche: normal affect with good insight into her medical conditions    Pertinent lab review:    Component      Latest Ref Rng & Units 5/2/2018 3/10/2018 3/10/2018 3/10/2018           4:06 PM 11:33 AM 11:33 AM 11:33 AM   T4, Total      4.5 - 12.0 ug/dL   0.4 (LL)    T3 Uptake      24 - 39 %   12 (L)    Free Thyroxine Index (FTI)      1.2 - 4.9   0.0 (L)    Thyroid peroxidase Ab      0 - 34 IU/mL  15     Thyroglobulin Ab      0.0 - 0.9 IU/mL  <1.0     TSH      0.450 - 4.500 uIU/mL 18.800 (H)   77.130 (H)     Component      Latest Ref Rng & Units 3/10/2018 12/2/2017 11/27/2017 5/18/2017          11:32 AM 11:00 AM  7:30 AM  9:14 AM   Cholesterol, total      100 - 199 mg/dL 312 (H) 383 (H) 362 (H) 253 (H)   Triglyceride      0 - 149 mg/dL 104 113 191 (H) 70   HDL Cholesterol      >39 mg/dL 62 73 65 72   VLDL, calculated      5 - 40 mg/dL 21 23 38 14   LDL, calculated      0 - 99 mg/dL 229 (H) 287 (H) 259 (H) 167 (H)   Comment Comment Comment      Assessment and plan:  Efren Duke is a 40 y.o. female here for recently diagnosed hypothyroidism. She clinically appears euthyroid by exam on current LT4 dose of 75 mcg daily, however recent labs indicate she remains under-replaced after ~7 weeks on this medication. Will increase to 112 mcg daily and have her repeat TSH with FT4/FT3 in 2 months. She is taking her thyroid medication correctly - we reviewed the appropriate way to take it. Regarding her very high LDL, we discussed that this is most likely familial hypercolesterolemia, however her levels uptrended significantly from May to Nov 2017 - perhaps this is reflective of her liver inflammation vs untreated hypothyroidism. Recommend treating hypothyroidism at least 6 months before rechecking lipids. Discussed that [id number] may still be indicated in light of her statin intolerance. F/u with Dr Juan Pablo Flores. I spent 45 minutes with the patient today and > 50% of the time was spent counseling the patient about hypothyroidism: its etiology, clinical manifestations, diagnosis, and management. She will return in 4 months time with labs done just prior. Thank you for the opportunity to partake in this patients care.   Vance Garcias MD  [facility]  [facility]

## 2018-05-15 NOTE — PROGRESS NOTES
Sandra Adkins is a 40 y.o. female      Chief Complaint   Patient presents with    New Patient     Pt seen at the request by Dr. Amanda Hernandez to eval Thyroid Problem.  Thyroid Problem     Hypothyroidism. 1. Have you been to the ER, urgent care clinic since your last visit? Hospitalized since your last visit? No    2. Have you seen or consulted any other health care providers outside of the [facility] [place] since your last visit? Include any pap smears or colon screening.  No

## 2018-05-15 NOTE — PATIENT INSTRUCTIONS
Take your levothyroxine (thyroid medication) first thing in the morning with a glass of water. Wait at least 30 minutes before eating, drinking coffee, or taking other medications. Wait 2-4 hours before taking any supplements containing calcium or iron (ie: multivitamins or prenatal vitamins). Do labs in mid-July, then again 2-3 days before your visit in September.

## 2018-05-15 NOTE — MR AVS SNAPSHOT
[address] [facility] [address] [place] 
[phone] Patient: Piero Taylor MRN: [id number] Visit Information Date & Time Provider Department Dept. Phone Encounter #  
 5/15/2018  1:30 PM Abbe Donald, 1024 [facility] Diabetes and Endocrinology [phone] [id number] Your Appointments 5/24/2018 10:00 AM  
Follow Up with DEION Coy 75 ([facility]) Appt Note: Follow up 200 [facility] [place] [phone] [facility]ly [facility], [facility] [address]  
[phone]  
  
   
 [address] [facility] [place] 505 [facility] [address]  
  
    
 5/31/2018  9:00 AM  
PHYSICAL with Maverick Sharma MD  
[facility]) Appt Note: [address] [facility] [address] [facility] [address]  
[address] [address] [facility] [address] [facility] [place]  
  
    
 6/1/2018  9:20 AM  
ESTABLISHED PATIENT with [address] MD Silvino  
[facility] ([facility]) Appt Note: 3 mo f/u per Dr. Claire Ervin [address]  [address] [facility] [place]  
[address] [facility],[address] [address] Upcoming Health Maintenance Date Due  
 PAP AKA CERVICAL CYTOLOGY 6/14/2018 Influenza Age 5 to Adult 8/1/2018 DTaP/Tdap/Td series (2 - Td) 1/1/2024 Allergies as of 5/15/2018  Review Complete On: 5/15/2018 By: Abbe Donald MD  
  
 Severity Noted Reaction Type Reactions Atorvastatin  01/23/2018    Other (comments) Sulfa (Sulfonamide Antibiotics)  01/26/2017    Rash Current Immunizations  Reviewed on 5/9/2017 Name Date Influenza Vaccine (Quad) PF 9/29/2017 Not reviewed this visit You Were Diagnosed With   
  
 Codes Comments Acquired hypothyroidism    -  Primary ICD-10-CM: E03.9 ICD-9-CM: 909. 9 Vitals BP Pulse Height(growth percentile) Weight(growth percentile) BMI OB Status 103/77 (BP 1 Location: Left arm, BP Patient Position: Sitting) 68 5' 3\" (1.6 m) 123 lb 12.8 oz (56.2 kg) 21.93 kg/m2 Medically Induced Smoking Status Never Smoker Vitals History BMI and BSA Data Body Mass Index Body Surface Area  
 21.93 kg/m 2 1.58 m 2 Preferred Pharmacy Pharmacy Name Phone [id number] [address] [phone] Your Updated Medication List  
  
   
This list is accurate as of 5/15/18  2:27 PM.  Always use your most recent med list.  
  
  
  
  
 alirocumab 75 mg/mL injector pen Commonly known as:  PRALUENT PEN  
1 mL by SubCUTAneous route Once every 2 weeks. CO Q-10 10 mg Cap Generic drug:  coenzyme q10 Take  by mouth. KAITLIB FE 0.8mg-25mcg(24) and 75 mg (4) Chew Generic drug:  noreth-ethinyl estradiol-iron TAKE 1 TABLET BY MOUTH EVERY DAY  
  
 levothyroxine 112 mcg tablet Commonly known as:  SYNTHROID Take 1 Tab by mouth Daily (before breakfast). multivitamin tablet Commonly known as:  ONE A DAY Take 1 Tab by mouth daily. omeprazole 20 mg capsule Commonly known as:  PRILOSEC Take 20 mg by mouth daily. sertraline 50 mg tablet Commonly known as:  ZOLOFT Take 1 Tab by mouth daily. VITAMIN D3 1,000 unit tablet Generic drug:  cholecalciferol Take  by mouth daily. Prescriptions Sent to Pharmacy Refills  
 levothyroxine (SYNTHROID) 112 mcg tablet 5 Sig: Take 1 Tab by mouth Daily (before breakfast). Class: Normal  
 Pharmacy: [address] Ph #: [phone] Route: Oral  
  
We Performed the Following TSH AND FREE T4 [14445 CPT(R)] TSH AND FREE T4 [10995 CPT(R)] Patient Instructions Take your levothyroxine (thyroid medication) first thing in the morning with a glass of water.   
 
Wait at least 30 minutes before eating, drinking coffee, or taking other medications. Wait 2-4 hours before taking any supplements containing calcium or iron (ie: multivitamins or prenatal vitamins). Do labs in mid-July, then again 2-3 days before your visit in September. Introducing [facility] & HEALTH SERVICES! Dear Terra Cano: Thank you for requesting a OnePIN account. Our records indicate that you already have an active OnePIN account. You can access your account anytime at [email or web address] Did you know that you can access your hospital and ER discharge instructions at any time in OnePIN? You can also review all of your test results from your hospital stay or ER visit. Additional Information If you have questions, please visit the Frequently Asked Questions section of the OnePIN website at [email or web address] Remember, OnePIN is NOT to be used for urgent needs. For medical emergencies, dial 911. Now available from your iPhone and Android! Please provide this summary of care documentation to your next provider. Your primary care clinician is listed as Chary Mederos. If you have any questions after today's visit, please call [phone].

## 2018-05-31 ENCOUNTER — OFFICE VISIT (OUTPATIENT)
Dept: INTERNAL MEDICINE CLINIC | Age: 38
End: 2018-05-31

## 2018-05-31 VITALS
HEIGHT: 63 IN | WEIGHT: 123.4 LBS | OXYGEN SATURATION: 98 % | HEART RATE: 73 BPM | RESPIRATION RATE: 16 BRPM | DIASTOLIC BLOOD PRESSURE: 64 MMHG | BODY MASS INDEX: 21.86 KG/M2 | SYSTOLIC BLOOD PRESSURE: 100 MMHG | TEMPERATURE: 98.3 F

## 2018-05-31 DIAGNOSIS — E03.9 ACQUIRED HYPOTHYROIDISM: ICD-10-CM

## 2018-05-31 DIAGNOSIS — Z00.00 WELL WOMAN EXAM (NO GYNECOLOGICAL EXAM): Primary | ICD-10-CM

## 2018-05-31 DIAGNOSIS — F33.8 SEASONAL AFFECTIVE DISORDER (HCC): ICD-10-CM

## 2018-05-31 DIAGNOSIS — R74.8 ELEVATED LIVER ENZYMES: ICD-10-CM

## 2018-05-31 DIAGNOSIS — E78.01 FAMILIAL HYPERCHOLESTEROLEMIA: ICD-10-CM

## 2018-05-31 DIAGNOSIS — F32.5 DEPRESSION, MAJOR, IN REMISSION (HCC): ICD-10-CM

## 2018-05-31 NOTE — PROGRESS NOTES
Chief Complaint   Patient presents with    Physical     Patient states she is here for her routine physical.

## 2018-05-31 NOTE — PROGRESS NOTES
Subjective:   40 y.o. female for Well Woman Check. Her gyne and breast care is done elsewhere by her Ob-Gyne physician. ROS: Feeling generally well. No TIA's or unusual headaches, no dysphagia. No prolonged cough. No dyspnea or chest pain on exertion. No abdominal pain, change in bowel habits, black or bloody stools. No urinary tract symptoms. No new or unusual musculoskeletal symptoms- muscle strength is improving. Specific concerns today:     Hypothyroidism  Patient is seen by Dr. Shamar Og for followup of hypothyroidism. Has been seen in the past 2 weeks  and had TSH checked Levothyroxine increased. Compliant with levothyroxine. Had mild insomnia intially with with dose change. Thyroid ROS: denies fatigue, weight changes, heat/cold intolerance, bowel/skin changes or CVS symptoms. Depression  Patient is seen for followup of depression. Treatment includes Zoloft and no other therapies. she denies suicidal thoughts without plan. she experiences the following side effects from the treatment: Decreased libido. Cardiovascular Review:  The patient has hyperlipidemia. Diet and Lifestyle: generally follows a low fat low cholesterol diet, nonsmoker  Home BP Monitoring: is not measured at home. Pertinent ROS: no TIA's, no chest pain on exertion, no dyspnea on exertion, no swelling of ankles. Objective: The patient appears well, alert, oriented x 3, in no distress. Visit Vitals    /64 (BP 1 Location: Left arm, BP Patient Position: Sitting)    Pulse 73    Temp 98.3 °F (36.8 °C) (Oral)    Resp 16    Ht 5' 3\" (1.6 m)    Wt 123 lb 6.4 oz (56 kg)    SpO2 98%    BMI 21.86 kg/m2     ENT normal.  Neck supple. No adenopathy or thyromegaly. Lungs are clear, good air entry, no wheezes, rhonchi or rales. S1 and S2 normal, no murmurs, regular rate and rhythm. Abdomen soft without tenderness, guarding, mass or organomegaly. Extremities show no edema, normal peripheral pulses.  Neurological is normal, no focal findings. Breast and Pelvic exams are deferred. Assessment/Plan:   Diagnoses and all orders for this visit:    1. Well woman exam (no gynecological exam)- Health Maintenance reviewed and addressed as ordered below       2. Seasonal affective disorder ([facility]- improved with Celexa will consider changing to Wellbutrin or Effexor if sexual side effects persist.     3. Depression, major, in remission ([facility]- see above. Patient Education:  Reviewed concept of depression as biochemical imbalance of neurotransmitters and rationale for treatment. Instructed patient to contact office or 911 promptly should condition worsen or any new symptoms appear and provided on-call telephone numbers. IF THE PATIENT HAS ANY SUICIDAL OR HOMICIDAL IDEATION, CALL THE OFFICE, DISCUSS WITH A SUPPORT MEMBER OR GO TO THE ER IMMEDIATELY. Patient was agreeable with this      4. Acquired hypothyroidism- per endocrinology     5. Familial hypercholesterolemia- check in 6 months now that TFTs improving. 6. Elevated liver enzymes    Follow-up Disposition:  Return in about 6 months (around 11/30/2018) for Follow-up, Labs completed 2 weeks before appointment. Medication risks/benefits/costs/interactions/alternatives discussed with patient. Piero Taylor  was given an after visit summary which includes diagnoses, current medications, & vitals. she expressed understanding with the diagnosis and plan.

## 2018-05-31 NOTE — PATIENT INSTRUCTIONS
It was a pleasure to see you! As discussed:    I'm glad you are better. Recovering From Depression: Care Instructions  Your Care Instructions  Taking good care of yourself is important as you recover from depression. In time, your symptoms will fade as your treatment takes hold. Do not give up. Instead, focus your energy on getting better. Your mood will improve. It just takes some time. Focus on things that can help you feel better, such as being with friends and family, eating well, and getting enough rest. But take things slowly. Do not do too much too soon. You will begin to feel better gradually. Follow-up care is a key part of your treatment and safety. Be sure to make and go to all appointments, and call your doctor if you are having problems. It's also a good idea to know your test results and keep a list of the medicines you take. How can you care for yourself at home? Be realistic  · If you have a large task to do, break it up into smaller steps you can handle, and just do what you can. · You may want to put off important decisions until your depression has lifted. If you have plans that will have a major impact on your life, such as marriage, divorce, or a job change, try to wait a bit. Talk it over with friends and loved ones who can help you look at the overall picture first.  · Reaching out to people for help is important. Do not isolate yourself. Let your family and friends help you. Find someone you can trust and confide in, and talk to that person. · Be patient, and be kind to yourself. Remember that depression is not your fault and is not something you can overcome with willpower alone. Treatment is necessary for depression, just like for any other illness. Feeling better takes time, and your mood will improve little by little. Stay active  · Stay busy and get outside. Take a walk, or try some other light exercise.   · Talk with your doctor about an exercise program. Exercise can help with mild depression. · Go to a movie or concert. Take part in a Taoism activity or other social gathering. Go to a ball game. · Ask a friend to have dinner with you. Take care of yourself  · Eat a balanced diet with plenty of fresh fruits and vegetables, whole grains, and lean protein. If you have lost your appetite, eat small snacks rather than large meals. · Avoid drinking alcohol or using illegal drugs. Do not take medicines that have not been prescribed for you. They may interfere with medicines you may be taking for depression, or they may make your depression worse. · Take your medicines exactly as they are prescribed. You may start to feel better within 1 to 3 weeks of taking antidepressant medicine. But it can take as many as 6 to 8 weeks to see more improvement. If you have questions or concerns about your medicines, or if you do not notice any improvement by 3 weeks, talk to your doctor. · If you have any side effects from your medicine, tell your doctor. Antidepressants can make you feel tired, dizzy, or nervous. Some people have dry mouth, constipation, headaches, sexual problems, or diarrhea. Many of these side effects are mild and will go away on their own after you have been taking the medicine for a few weeks. Some may last longer. Talk to your doctor if side effects are bothering you too much. You might be able to try a different medicine. · Get enough sleep. If you have problems sleeping:  ¨ Go to bed at the same time every night, and get up at the same time every morning. ¨ Keep your bedroom dark and quiet. ¨ Do not exercise after 5:00 p.m. ¨ Avoid drinks with caffeine after 5:00 p.m. · Avoid sleeping pills unless they are prescribed by the doctor treating your depression. Sleeping pills may make you groggy during the day, and they may interact with other medicine you are taking.   · If you have any other illnesses, such as diabetes, heart disease, or high blood pressure, make sure to continue with your treatment. Tell your doctor about all of the medicines you take, including those with or without a prescription. · Keep the numbers for these national suicide hotlines: 3-425-396-TALK (2-610.180.8750) and 7-646-OHXRBBS (2-444.674.4299). If you or someone you know talks about suicide or feeling hopeless, get help right away. When should you call for help? Call 911 anytime you think you may need emergency care. For example, call if:  ? · You feel like hurting yourself or someone else. ? · Someone you know has depression and is about to attempt or is attempting suicide. ?Call your doctor now or seek immediate medical care if:  ? · You hear voices. ? · Someone you know has depression and:  ¨ Starts to give away his or her possessions. ¨ Uses illegal drugs or drinks alcohol heavily. ¨ Talks or writes about death, including writing suicide notes or talking about guns, knives, or pills. ¨ Starts to spend a lot of time alone. ¨ Acts very aggressively or suddenly appears calm. ? Watch closely for changes in your health, and be sure to contact your doctor if:  ? · You do not get better as expected. Where can you learn more? Go to http://cristina-mily.info/. Enter D928 in the search box to learn more about \"Recovering From Depression: Care Instructions. \"  Current as of: May 12, 2017  Content Version: 11.4  © 1332-2473 [employer]. Care instructions adapted under license by [employer] (which disclaims liability or warranty for this information). If you have questions about a medical condition or this instruction, always ask your healthcare professional. [place] [address] any warranty or liability for your use of this information.

## 2018-07-13 LAB
T4 FREE SERPL-MCNC: 1.62 NG/DL (ref 0.82–1.77)
TSH SERPL DL<=0.005 MIU/L-ACNC: 3.19 UIU/ML (ref 0.45–4.5)

## 2018-07-23 RX ORDER — NORETHINDRONE AND ETHINYL ESTRADIOL AND FERROUS FUMARATE 0.8-25(24)
1 KIT ORAL DAILY
Qty: 1 DOSE PACK | Refills: 1 | Status: SHIPPED | OUTPATIENT
Start: 2018-07-23 | End: 2018-08-30 | Stop reason: SDUPTHER

## 2018-08-30 ENCOUNTER — OFFICE VISIT (OUTPATIENT)
Dept: OBGYN CLINIC | Age: 38
End: 2018-08-30

## 2018-08-30 VITALS
BODY MASS INDEX: 21.33 KG/M2 | HEIGHT: 63 IN | WEIGHT: 120.4 LBS | DIASTOLIC BLOOD PRESSURE: 66 MMHG | SYSTOLIC BLOOD PRESSURE: 100 MMHG

## 2018-08-30 DIAGNOSIS — Z01.419 WELL WOMAN EXAM: Primary | ICD-10-CM

## 2018-08-30 DIAGNOSIS — Z11.51 SCREENING FOR HUMAN PAPILLOMAVIRUS: ICD-10-CM

## 2018-08-30 RX ORDER — NORETHINDRONE AND ETHINYL ESTRADIOL AND FERROUS FUMARATE 0.8-25(24)
1 KIT ORAL DAILY
Qty: 3 DOSE PACK | Refills: 4 | Status: SHIPPED | OUTPATIENT
Start: 2018-08-30 | End: 2018-09-27

## 2018-08-30 NOTE — PROGRESS NOTES
Annual exam    Yue Villalobos is a 40 y.o. G0 presenting for annual exam. Her main concerns today include discomfort in right breast. Denies trauma to breast. Reports first notice pain several months ago after episode of strep throat and flu-like illness, pain has gradually improved since that time. Patient with irregular menses for years. Menses have become more regular since beginning synthroid. Good cycle control on OCPs (needs refill). Followed by endocrinology (Dr Santa Gale). PCP- Dr. Robe Jasmine.  at [employer]. Went to [employer] for [employer], grew up in [place]. 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 Ob/Gyn Hx:  G0   LMP- menses irregular  Menarche- age 12  Menses- regular monthly cycles? no, last 4 days, passage of clots? no, intermenstrual spotting? no, Number of pads/tampons per day? 3  Contraception- ocp's  STI- denies  ? SA- yes    Health maintenance:  Pap- 2 years ago, normal per patient, denies h/o abnl paps  Mammo- 4-5 years ago, due to pain and lump, normal per patient, repeat at age 36  Colonoscopy- not indicated  Gardasil- outside of recommended age    Past Medical History:   Diagnosis Date    Hiatal hernia     Rhabdomyolysis due to statin therapy 12/2017    After atorvastatin 40 mg     Vitamin D deficiency        History reviewed. No pertinent surgical history. Family History   Problem Relation Age of Onset    Elevated Lipids Father     Stroke Maternal Grandfather     Heart Attack Paternal Grandmother     Heart Attack Paternal Grandfather     Heart Attack Paternal Aunt     Cancer Mother      renal cell carcinoma    No Known Problems Brother     Diabetes Paternal Uncle     Hypertension Maternal Grandmother        Social History     Social History    Marital status: SINGLE     Spouse name: N/A    Number of children: N/A    Years of education: N/A     Occupational History    Not on file.      Social History Main Topics    Smoking status: Never Smoker    Smokeless tobacco: Never Used    Alcohol use Yes      Comment: rare    Drug use: No    Sexual activity: Yes     Partners: Male     Birth control/ protection: Pill     Other Topics Concern    Not on file     Social History Narrative       Current Outpatient Prescriptions   Medication Sig Dispense Refill    noreth-ethinyl estradiol-iron (KAITLIB FE) 0.8mg-25mcg(24) and 75 mg (4) chew Take 1 Tab by mouth daily. 1 Dose Pack 1    levothyroxine (SYNTHROID) 112 mcg tablet Take 1 Tab by mouth Daily (before breakfast). 30 Tab 5    sertraline (ZOLOFT) 50 mg tablet Take 1 Tab by mouth daily. 60 Tab 2    KAITLIB FE 0.8mg-25mcg(24) and 75 mg (4) chew TAKE 1 TABLET BY MOUTH EVERY DAY  12    alirocumab (PRALUENT PEN) 75 mg/mL injector pen 1 mL by SubCUTAneous route Once every 2 weeks. 2 Syringe 11    multivitamin (ONE A DAY) tablet Take 1 Tab by mouth daily.  coenzyme q10 (CO Q-10) 10 mg cap Take  by mouth.  cholecalciferol (VITAMIN D3) 1,000 unit tablet Take  by mouth daily.          Allergies   Allergen Reactions    Atorvastatin Other (comments)    Sulfa (Sulfonamide Antibiotics) Rash       Review of Systems - History obtained from the patient  Constitutional: negative for weight loss, fever, night sweats  HEENT: negative for hearing loss, earache, congestion, snoring, sorethroat  CV: negative for chest pain, palpitations, edema  Resp: negative for cough, shortness of breath, wheezing  GI: negative for change in bowel habits, abdominal pain, black or bloody stools  : negative for frequency, dysuria, hematuria, vaginal discharge  MSK: negative for back pain, joint pain, muscle pain  Breast: negative for breast lumps, nipple discharge, galactorrhea, +right breast tenderness  Skin :negative for itching, rash, hives  Neuro: negative for dizziness, headache, confusion, weakness  Psych: negative for anxiety, depression, change in mood  Heme/lymph: negative for bleeding, bruising, pallor    Physical Exam    Visit Vitals  /66 (BP 1 Location: Left arm, BP Patient Position: Sitting)    Ht 5' 3\" (1.6 m)    Wt 120 lb 6.4 oz (54.6 kg)    BMI 21.33 kg/m2       Constitutional  · Appearance: well-nourished, well developed, alert, in no acute distress    HENT  · Head and Face: appears normal    Neck  · Inspection/Palpation: normal appearance, no masses or tenderness  · Lymph Nodes: no lymphadenopathy present  · Thyroid: gland size normal, nontender, no nodules or masses present on palpation    Chest  · Respiratory Effort: non-labored breathing  · Auscultation: CTAB, normal breath sounds    Cardiovascular  · Heart:  · Auscultation: regular rate and rhythm without murmur  · Extremities: no peripheral edema    Breasts  · Inspection of Breasts: breasts symmetrical, no skin changes, no discharge present, nipple appearance normal, no skin retraction present  · Palpation of Breasts and Axillae: no masses present on palpation, mildly tender upper outer quadrant of right breast/chest wall, no appreciable mass/lesion/abnormality at this location  · Axillary Lymph Nodes: no lymphadenopathy present    Gastrointestinal  · Abdominal Examination: abdomen non-tender to palpation, normal bowel sounds, no masses present  · Liver and spleen: no hepatomegaly present, spleen not palpable  · Hernias: no hernias identified    Genitourinary  · External Genitalia: normal appearance for age, no discharge present, no tenderness present, no inflammatory lesions present, no masses present, no atrophy present  · Vagina: normal vaginal vault without central or paravaginal defects, no discharge present, no inflammatory lesions present, no masses present  · Bladder: non-tender to palpation  · Urethra: appears normal  · Cervix: normal   · Uterus: normal size, shape and consistency  · Adnexa: no adnexal tenderness present, no adnexal masses present  · Perineum: perineum within normal limits, no evidence of trauma, no rashes or skin lesions present    Skin  · General Inspection: no rash, no lesions identified    Neurologic/Psychiatric  · Mental Status:  · Orientation: grossly oriented to person, place and time  · Mood and Affect: mood normal, affect appropriate      Assessment/Plan:  40 y.o. G0 presenting for annual exam. Overall doing well. +right breast mastalgia.     Health Maintenance:  -counseled re: diet, exercise, healthy lifestyle  -pap/HPV today  -declines STI testing  -mammo not yet indicated  -refill OCPs provided, reviewed R/B/A of other options for hormonal regulation of cycles    Mastalgia: right breast, benign exam, suspected chest wall pain on exam (reproducible), symptoms improving  -reassurance provided  -advised supportive bra, cool compresses, tylenol/ibuprofen, Vivien/primrose oil, etc  -no further intervention required if symptoms continue to improve, however, discussed referral to breast center if breast discomfort worsening or persists    RTC: 1 year for AE or sooner julia Ellington MD  8/30/2018  9:51 AM

## 2018-08-30 NOTE — PATIENT INSTRUCTIONS
Pelvic Exam: Care Instructions  Your Care Instructions    When your doctor examines all of your pelvic organs, it's called a pelvic exam. Two good reasons to have this kind of exam are to check for sexually transmitted infections (STIs) and to get a Pap test. A Pap test is also called a Pap smear. It checks for early changes that can lead to cancer of the cervix. Sometimes a pelvic exam is part of a regular checkup. In this case, you can do some things to make your test results as accurate as possible. · Try to schedule the exam when you don't have your period. · Don't use douches, tampons, or vaginal medicines, sprays, or powders for 24 hours before your exam.  · Don't have sex for 24 hours before your exam.  Other times, women have this kind of exam at any time of the month. This is because they have pelvic pain, bleeding, or discharge. Or they may have another pelvic problem. Before your exam, it's important to share some information with your doctor. For example, if you are a survivor of rape or sexual abuse, you can talk about any concerns you may have. Your doctor will also want to know if you are pregnant or use birth control. And he or she will want to hear about any problems, surgeries, or procedures you have had in your pelvic area. You will also need to tell your doctor when your last period was. Follow-up care is a key part of your treatment and safety. Be sure to make and go to all appointments, and call your doctor if you are having problems. It's also a good idea to know your test results and keep a list of the medicines you take. How is a pelvic exam done? · During a pelvic exam, you will:  ¨ Take off your clothes below the waist. You will get a paper or cloth cover to put over the lower half of your body. Modesta Cumming on your back on an exam table. Your feet will be raised above you. Stirrups will support your feet. · The doctor will:  Kerri Coral you to relax your knees.  Your knees need to lean out, toward the walls. ¨ Check the opening of your vagina for sores or swelling. ¨ Gently put a tool called a speculum into your vagina. It opens the vagina a little bit. You will feel some pressure. But if you are relaxed, it will not hurt. It lets your doctor see inside the vagina. ¨ Use a small brush, spatula, or swab to get a sample of cells, if you are having a Pap test or culture. The doctor then removes the speculum. ¨ Put on gloves and put one or two fingers of one hand into your vagina. The other hand goes on your lower belly. This lets your doctor feel your pelvic organs. You will probably feel some pressure. Try to stay relaxed. ¨ Put one gloved finger into your rectum and one into your vagina, if needed. This can also help check your pelvic organs. This exam takes about 10 minutes. At the end, you will get a washcloth or tissue to clean your vaginal area. It's normal to have some discharge after this exam. You can then get dressed. Some test results may be ready right away. But results from a culture or a Pap test may take several days or a few weeks. Why should you have a pelvic exam?  · You want to have recommended screening tests. This includes a Pap test.  · You think you have a vaginal infection. Signs include itching, burning, or unusual discharge. · You might have been exposed to a sexually transmitted infection (STI), such as chlamydia or herpes. · You have vaginal bleeding that is not part of your normal menstrual period. · You have pain in your belly or pelvis. · You have been sexually assaulted. A pelvic exam lets your doctor collect evidence and check for STIs. · You are pregnant. · You are having trouble getting pregnant. What are the risks of a pelvic exam?  There are no risks from a pelvic exam.  When should you call for help? Watch closely for changes in your health, and be sure to contact your doctor if you have any problems. Where can you learn more?   Go to http://cristina-mily.info/. Enter N688 in the search box to learn more about \"Pelvic Exam: Care Instructions. \"  Current as of: October 6, 2017  Content Version: 11.7  © 1693-1660 [employer], [employer]. Care instructions adapted under license by [employer] (which disclaims liability or warranty for this information). If you have questions about a medical condition or this instruction, always ask your healthcare professional. [place] [address] any warranty or liability for your use of this information.

## 2018-09-01 LAB
CYTOLOGIST CVX/VAG CYTO: NORMAL
CYTOLOGY CVX/VAG DOC THIN PREP: NORMAL
DX ICD CODE: NORMAL
HPV I/H RISK 4 DNA CVX QL PROBE+SIG AMP: NEGATIVE
Lab: NORMAL
OTHER STN SPEC: NORMAL
PATH REPORT.FINAL DX SPEC: NORMAL
STAT OF ADQ CVX/VAG CYTO-IMP: NORMAL

## 2018-09-12 LAB
T4 FREE SERPL-MCNC: 1.43 NG/DL (ref 0.82–1.77)
TSH SERPL DL<=0.005 MIU/L-ACNC: 2.55 UIU/ML (ref 0.45–4.5)

## 2018-09-13 ENCOUNTER — OFFICE VISIT (OUTPATIENT)
Dept: ENDOCRINOLOGY | Age: 38
End: 2018-09-13

## 2018-09-13 VITALS
HEART RATE: 74 BPM | RESPIRATION RATE: 16 BRPM | DIASTOLIC BLOOD PRESSURE: 69 MMHG | WEIGHT: 122.7 LBS | HEIGHT: 63 IN | SYSTOLIC BLOOD PRESSURE: 107 MMHG | BODY MASS INDEX: 21.74 KG/M2 | OXYGEN SATURATION: 98 %

## 2018-09-13 DIAGNOSIS — E03.9 ACQUIRED HYPOTHYROIDISM: Primary | ICD-10-CM

## 2018-09-13 DIAGNOSIS — E78.01 FAMILIAL HYPERCHOLESTEROLEMIA: ICD-10-CM

## 2018-09-13 RX ORDER — LEVOTHYROXINE SODIUM 125 UG/1
125 TABLET ORAL
Qty: 30 TAB | Refills: 5 | Status: SHIPPED | OUTPATIENT
Start: 2018-09-13 | End: 2019-03-27 | Stop reason: SDUPTHER

## 2018-09-13 NOTE — MR AVS SNAPSHOT
[address] [facility] 202 [address] [place] [address] 
[phone] Patient: Juan Jose Young MRN: [id number] Visit Information Date & Time Provider Department Dept. Phone Encounter #  
 9/13/2018  3:50 PM MD [facility] [phone] Your Appointments 12/6/2018  9:30 AM  
ROUTINE CARE with Ana Luisa Coffey MD  
[facility] [address] [place] [address]) Appt Note: 6m fuv  
 [address] [facility] [address]  
[address] [place]  
  
    
 1/3/2019  9:20 AM  
ESTABLISHED PATIENT with Clemente Armas MD  
[facility] ([address] [facility]) Appt Note: 3 mo f/u per Dr. Estelita Ramirez; 3 mo f/u per Dr. Annabelle Sánchez rsd w/pt per verbal req from Dr. Annabelle Sánchez on 5/29/18-6/1/18 appt can be rsd to January 2019 Alessandro Nava [address]  [address] [facility]  
[address] [facility] 7 [phone] Upcoming Health Maintenance Date Due Influenza Age 5 to Adult 8/1/2018 PAP AKA CERVICAL CYTOLOGY 8/30/2021 DTaP/Tdap/Td series (2 - Td) 1/1/2024 Allergies as of 9/13/2018  Review Complete On: 9/13/2018 By: Isatu Post Severity Noted Reaction Type Reactions Atorvastatin  01/23/2018    Other (comments) Sulfa (Sulfonamide Antibiotics)  01/26/2017    Rash Current Immunizations  Reviewed on 5/9/2017 Name Date Influenza Vaccine (Quad) PF 9/29/2017 Not reviewed this visit You Were Diagnosed With   
  
 Codes Comments Acquired hypothyroidism    -  Primary ICD-10-CM: E03.9 ICD-9-CM: 724. 9 Vitals BP Pulse Resp Height(growth percentile) Weight(growth percentile) SpO2  
 107/69 74 16 5' 3\" (1.6 m) 122 lb 11.2 oz (55.7 kg) 98% BMI OB Status Smoking Status 21.74 kg/m2 Medically Induced Never Smoker Vitals History BMI and BSA Data Body Mass Index Body Surface Area 21.74 kg/m 2 1.57 m 2 Preferred Pharmacy Pharmacy Name Phone [id number] [phone] Your Updated Medication List  
  
   
This list is accurate as of 9/13/18  4:17 PM.  Always use your most recent med list.  
  
  
  
  
 alirocumab 75 mg/mL injector pen Commonly known as:  PRALUENT PEN  
1 mL by SubCUTAneous route Once every 2 weeks. CO Q-10 10 mg Cap Generic drug:  coenzyme q10 Take  by mouth.  
  
 levothyroxine 125 mcg tablet Commonly known as:  SYNTHROID Take 1 Tab by mouth Daily (before breakfast). multivitamin tablet Commonly known as:  ONE A DAY Take 1 Tab by mouth daily. noreth-ethinyl estradiol-iron 0.8mg-25mcg(24) and 75 mg (4) Chew Commonly known as:  Jose Cocker Take 1 Tab by mouth daily for 28 days. sertraline 50 mg tablet Commonly known as:  ZOLOFT Take 1 Tab by mouth daily. VITAMIN D3 1,000 unit tablet Generic drug:  cholecalciferol Take  by mouth daily. Prescriptions Sent to Pharmacy Refills  
 levothyroxine (SYNTHROID) 125 mcg tablet 5 Sig: Take 1 Tab by mouth Daily (before breakfast). Class: Normal  
 Pharmacy: [address] [place] [address] #: [phone] Route: Oral  
  
To-Do List   
 Around 12/13/2018 Lab:  TSH AND FREE T4 Around 03/13/2019 Lab:  TSH AND FREE T4 Introducing [address]! Dear Teagan Avalos: Thank you for requesting a Bfly account. Our records indicate that you already have an active Bfly account. You can access your account anytime at [email or web address] [email or web address] Did you know that you can access your hospital and ER discharge instructions at any time in Bfly?   You can also review all of your test results from your hospital stay or ER visit. Additional Information If you have questions, please visit the Frequently Asked Questions section of the [employer] website at [email or web address] Remember, [employer] is NOT to be used for urgent needs. For medical emergencies, dial 911. Now available from your iPhone and Android! Please provide this summary of care documentation to your next provider. Your primary care clinician is listed as Dima Marshall. If you have any questions after today's visit, please call [phone].

## 2018-09-13 NOTE — PROGRESS NOTES
Lab Results   Component Value Date/Time    TSH 2.550 09/11/2018 04:53 PM    T3 Uptake 22 (L) 05/02/2018 04:06 PM    T4, Free 1.43 09/11/2018 04:53 PM    T4, Total 9.2 05/02/2018 04:06 PM

## 2018-09-13 NOTE — PROGRESS NOTES
Endocrinology Visit    CC: hypothyroidism    History of present illness:  Win Pace is a 40 y.o. female who returns for hypothyroidism. TSH was elevated to 77 in March. She had symptoms including exhaustion, muscle weakness, cold intolerance, 10 lb weight gain, and generalized edema in the setting of recently diagnosed statin-induced CK elevation (possibly rhabdomyolysis). She was started on levothyroxine 75 mcg daily on 3/13/18 and reports resolution of her hypothyroid symptoms with in a few weeks of starting the medication. Most recent TSH returned improved but still elevated at 18 in early May. I saw her in initial consultation in May at which time I increased her dose to 112 mcg daily. She currently takes generic levothyroxine 112 mcg daily. She takes this correctly on an empty stomach, first thing in the morning waiting 30-60 minutes for food and coffee. Takes her MVI and OCP later in the day. She reports more regular cycles now and energy level is somewhat improved. She has lost about 10 lbs in the past 6 months. She denies racing heart, tremor, palpitations, heat or cold intolerance, or changes to her bowel habits. Still c/o of some muscle weakness and soreness which is unexplained by activity. She has a h/o familial hypercholesterolemia (LDL>200) and is f/b Dr Reece Jensen, considering non-statin therapies including Repatha. Lipids will be rechecked later this year.      ROS: see HPI for pertinent positives and negatives, otherwise a 7 system review is negative    Problem list:  Patient Active Problem List   Diagnosis Code    Seasonal affective disorder ([facility] F33.9    Depression, major, in remission ([facility] F32.5    Family history of renal cancer Z80.51    Vitamin D insufficiency E55.9    Elevated liver enzymes R74.8    Rhabdomyolysis due to statin therapy M62.82    Familial hypercholesterolemia E78.01    Acquired hypothyroidism E03.9       Medical history:  Past Medical History:   Diagnosis Date  Hiatal hernia     Hypercholesterolemia     Hypothyroid     Rhabdomyolysis due to statin therapy 12/2017    After atorvastatin 40 mg     Vitamin D deficiency        Past surgical history:  No past surgical history on file. Medications:    Current Outpatient Prescriptions:     noreth-ethinyl estradiol-iron (KAITLIB FE) 0.8mg-25mcg(24) and 75 mg (4) chew, Take 1 Tab by mouth daily for 28 days. , Disp: 3 Dose Pack, Rfl: 4    levothyroxine (SYNTHROID) 112 mcg tablet, Take 1 Tab by mouth Daily (before breakfast). , Disp: 30 Tab, Rfl: 5    sertraline (ZOLOFT) 50 mg tablet, Take 1 Tab by mouth daily. , Disp: 60 Tab, Rfl: 2    multivitamin (ONE A DAY) tablet, Take 1 Tab by mouth daily. , Disp: , Rfl:     coenzyme q10 (CO Q-10) 10 mg cap, Take  by mouth., Disp: , Rfl:     cholecalciferol (VITAMIN D3) 1,000 unit tablet, Take  by mouth daily. , Disp: , Rfl:     alirocumab (PRALUENT PEN) 75 mg/mL injector pen, 1 mL by SubCUTAneous route Once every 2 weeks. , Disp: 2 Syringe, Rfl: 11    Allergies: Allergies   Allergen Reactions    Atorvastatin Other (comments)    Sulfa (Sulfonamide Antibiotics) Rash       Social History:  Social History     Social History    Marital status: SINGLE     Spouse name: N/A    Number of children: N/A    Years of education: N/A     Occupational History    Not on file. Social History Main Topics    Smoking status: Never Smoker    Smokeless tobacco: Never Used    Alcohol use Yes      Comment: rare    Drug use: No    Sexual activity: Yes     Partners: Male     Birth control/ protection: Pill     Other Topics Concern    Not on file     Social History Narrative    Pt is  at [employer]. She is from [place]. Went to [employer] for college (arts degree). Then went to [employer] for specialized animal training degree.            Family History:  Family History   Problem Relation Age of Onset    Elevated Lipids Father     Stroke Maternal Grandfather     Heart Attack Paternal Grandmother     Heart Attack Paternal Grandfather     Heart Attack Paternal Aunt     Cancer Mother      renal cell carcinoma    No Known Problems Brother     Diabetes Paternal Uncle     Hypertension Maternal Grandmother        Physical Exam:  Visit Vitals    /69    Pulse 74    Resp 16    Ht 5' 3\" (1.6 m)    Wt 122 lb 11.2 oz (55.7 kg)    SpO2 98%    BMI 21.74 kg/m2      Gen: NAD  Heent: mucous membranes moist, no lid lag, stare or exophthalmos. No scleral or conjunctival injection. Extra ocular muscles intact . Thyroid: moves well with swallowing, normal size, normal texture, no thyroid bruit, negative pembertons sign, no masses appreciated  Heme/lymph: no cervical, supraclavicular or submandibular lymphadenopathy is appreciated. Pulmonary: clear to auscultation bilaterally, no wheezes/rhonchi or rales  Cardiovascular: regular rate and rhythm, no murmurs, rubs or gallops, good distal pulses  GI: soft non tender, nondistended, normal bowel sounds  Extremities: no clubbing, cyanosis or edema. No onocholysis   Neuro: no tremor of the outstretch hands, reflexes are normal with normal relaxation phase.  Normal gait, normal concentration  Skin: normal texture, warm and dry, facial hirsutism noted  Psyche: normal affect with good insight into her medical conditions    Pertinent lab review:    Component      Latest Ref Rng & Units 9/11/2018 7/12/2018 5/2/2018 3/10/2018           4:53 PM  9:31 AM  4:06 PM 11:33 AM   TSH      0.450 - 4.500 uIU/mL 2.550 3.190 18.800 (H) 77.130 (H)   T4, Free      0.82 - 1.77 ng/dL 1.43 1.62       Lab Results   Component Value Date/Time    Cholesterol, total 312 (H) 03/10/2018 11:32 AM    HDL Cholesterol 62 03/10/2018 11:32 AM    LDL, calculated 229 (H) 03/10/2018 11:32 AM    VLDL, calculated 21 03/10/2018 11:32 AM    Triglyceride 104 03/10/2018 11:32 AM     Assessment and plan:  Masoud Mari is a 40 y.o. female here for recently diagnosed hypothyroidism. She is clinically and biochemically euthyroid on current LT4 dose of 112 mcg daily, however her energy level nor her TSH are back to baseline yet. Will increase to 125 mcg daily and have her repeat TSH with FT4/FT3 in 2-3 months. She is taking her thyroid medication correctly - we reviewed the appropriate way to take it. Regarding her very high LDL, we discussed that this is most likely familial hypercolesterolemia, however her levels uptrended significantly from May to Nov 2017 - perhaps this is reflective of her liver inflammation vs untreated hypothyroidism. Lipids will be rechecked later this year which is appropriate since hypothyroidism has been treated for over 6 months now. Discussed that PCSK9i may still be indicated in light of her statin intolerance. F/u with Dr Coletta Krabbe. I spent 25 minutes with the patient today and > 50% of the time was spent counseling the patient about hypothyroidism: its etiology, clinical manifestations, diagnosis, and management. She will return in 6 months time with labs done just prior. Thank you for the opportunity to partake in this patients care.   Ghassan Reyes MD  [facility]  [facility]

## 2018-10-01 ENCOUNTER — TELEPHONE (OUTPATIENT)
Dept: INTERNAL MEDICINE CLINIC | Age: 38
End: 2018-10-01

## 2018-10-01 DIAGNOSIS — E78.5 HYPERLIPIDEMIA, UNSPECIFIED HYPERLIPIDEMIA TYPE: Primary | ICD-10-CM

## 2018-10-01 NOTE — TELEPHONE ENCOUNTER
Informed patient labs due before next appointment. Patient will come by office to complete labs. Pt verbalized understanding.

## 2018-11-27 LAB
ALBUMIN SERPL-MCNC: 4.6 G/DL (ref 3.5–5.5)
ALBUMIN/GLOB SERPL: 1.7 {RATIO} (ref 1.2–2.2)
ALP SERPL-CCNC: 65 IU/L (ref 39–117)
ALT SERPL-CCNC: 20 IU/L (ref 0–32)
AST SERPL-CCNC: 23 IU/L (ref 0–40)
BILIRUB SERPL-MCNC: 0.6 MG/DL (ref 0–1.2)
BUN SERPL-MCNC: 14 MG/DL (ref 6–20)
BUN/CREAT SERPL: 18 (ref 9–23)
CALCIUM SERPL-MCNC: 9 MG/DL (ref 8.7–10.2)
CHLORIDE SERPL-SCNC: 99 MMOL/L (ref 96–106)
CHOLEST SERPL-MCNC: 224 MG/DL (ref 100–199)
CO2 SERPL-SCNC: 27 MMOL/L (ref 20–29)
CREAT SERPL-MCNC: 0.79 MG/DL (ref 0.57–1)
GLOBULIN SER CALC-MCNC: 2.7 G/DL (ref 1.5–4.5)
GLUCOSE SERPL-MCNC: 87 MG/DL (ref 65–99)
HDLC SERPL-MCNC: 54 MG/DL
LDLC SERPL CALC-MCNC: 145 MG/DL (ref 0–99)
POTASSIUM SERPL-SCNC: 4.1 MMOL/L (ref 3.5–5.2)
PROT SERPL-MCNC: 7.3 G/DL (ref 6–8.5)
SODIUM SERPL-SCNC: 137 MMOL/L (ref 134–144)
T4 FREE SERPL-MCNC: 1.47 NG/DL (ref 0.82–1.77)
TRIGL SERPL-MCNC: 125 MG/DL (ref 0–149)
TSH SERPL DL<=0.005 MIU/L-ACNC: 1.44 UIU/ML (ref 0.45–4.5)
VLDLC SERPL CALC-MCNC: 25 MG/DL (ref 5–40)

## 2018-11-28 ENCOUNTER — OFFICE VISIT (OUTPATIENT)
Dept: INTERNAL MEDICINE CLINIC | Age: 38
End: 2018-11-28

## 2018-11-28 VITALS
OXYGEN SATURATION: 99 % | BODY MASS INDEX: 22.64 KG/M2 | WEIGHT: 127.8 LBS | HEART RATE: 72 BPM | TEMPERATURE: 98.1 F | HEIGHT: 63 IN | RESPIRATION RATE: 16 BRPM

## 2018-11-28 DIAGNOSIS — E78.01 FAMILIAL HYPERCHOLESTEROLEMIA: ICD-10-CM

## 2018-11-28 DIAGNOSIS — R74.8 ELEVATED LIVER ENZYMES: ICD-10-CM

## 2018-11-28 DIAGNOSIS — F32.5 DEPRESSION, MAJOR, IN REMISSION (HCC): ICD-10-CM

## 2018-11-28 DIAGNOSIS — E03.9 ACQUIRED HYPOTHYROIDISM: Primary | ICD-10-CM

## 2018-11-28 DIAGNOSIS — F33.8 SEASONAL AFFECTIVE DISORDER (HCC): ICD-10-CM

## 2018-11-28 PROBLEM — M62.82 RHABDOMYOLYSIS DUE TO STATIN THERAPY: Status: RESOLVED | Noted: 2018-01-25 | Resolved: 2018-11-28

## 2018-11-28 PROBLEM — T46.6X5A RHABDOMYOLYSIS DUE TO STATIN THERAPY: Status: RESOLVED | Noted: 2018-01-25 | Resolved: 2018-11-28

## 2018-11-28 RX ORDER — NORETHINDRONE AND ETHINYL ESTRADIOL 0.8-25(24)
KIT ORAL
Refills: 4 | COMMUNITY
Start: 2018-10-28 | End: 2019-10-07 | Stop reason: SDUPTHER

## 2018-11-28 NOTE — TELEPHONE ENCOUNTER
Discussed at 11/28/18  appt. See office note   --  Hi Pineda Terry is followed by Dr. Sneha Sims for her hypothyroidism. She states they discussed trying to get her TSH below 1.0. From a PCP perspective her TSH is at goal. However I will defer to you all if you think she needs a higher levothyroxine dose.    Thanks,  Avery Huynh

## 2018-11-28 NOTE — PROGRESS NOTES
Chief Complaint Patient presents with  Follow-up 1. Have you been to the ER, urgent care clinic since your last visit? Hospitalized since your last visit? No 
 
2. Have you seen or consulted any other health care providers outside of the [facility] since your last visit? Include any pap smears or colon screening.  No

## 2018-11-28 NOTE — PROGRESS NOTES
HISTORY OF PRESENT ILLNESS Bobetta Sever is a 45 y.o. female. HPI Cardiovascular Review: 
The patient has hyperlipidemia. Diet and Lifestyle: exercises sporadically, nonsmoker Home BP Monitoring: is not measured at home. Pertinent ROS: no TIA's, no chest pain on exertion, no dyspnea on exertion, no swelling of ankles, taking medications as instructed\",no medication side effects noted. Hypothyroidism Patient is seen by Dr. Manolo Ortega  for followup of hypothyroidism. Has been seen in the past  3 months and had TSH checked. Compliant with levothyroxine. Thyroid ROS: Still feels like she needs to sleep extended period. Denies  weight changes, heat/cold intolerance, bowel/skin changes or CVS symptoms. Depression Patient is seen for followup of depression. Treatment includes Zoloft and no other therapies. she denies suicidal thoughts with specific plan. she experiences the following side effects from the treatment: none. PHQ over the last two weeks 11/28/2018 Little interest or pleasure in doing things Not at all Feeling down, depressed, irritable, or hopeless Not at all Total Score PHQ 2 0 Trouble falling or staying asleep, or sleeping too much - Feeling tired or having little energy - Poor appetite, weight loss, or overeating - Feeling bad about yourself - or that you are a failure or have let yourself or your family down - Trouble concentrating on things such as school, work, reading, or watching TV - Moving or speaking so slowly that other people could have noticed; or the opposite being so fidgety that others notice - Thoughts of being better off dead, or hurting yourself in some way -  
PHQ 9 Score - How difficult have these problems made it for you to do your work, take care of your home and get along with others - Review of Systems HENT: Positive for ear pain (right ear ). Gastrointestinal: Positive for abdominal pain.   
per HPI  
 Patient Active Problem List  
 Diagnosis Date Noted  Acquired hypothyroidism 05/15/2018  Familial hypercholesterolemia 02/28/2018  Rhabdomyolysis due to statin therapy 01/25/2018  Elevated liver enzymes 01/23/2018  Vitamin D insufficiency 05/26/2017  Seasonal affective disorder ([facility] 01/26/2017  Depression, major, in remission ([facility] 01/26/2017  Family history of renal cancer 01/26/2017 Current Outpatient Medications Medication Sig Dispense Refill  KAITLIB FE 0.8mg-25mcg(24) and 75 mg (4) chew TAKE 1 TABLET BY MOUTH EVERY DAY  4  
 sertraline (ZOLOFT) 50 mg tablet TAKE 1 TABLET BY MOUTH EVERY DAY 60 Tab 4  
 levothyroxine (SYNTHROID) 125 mcg tablet Take 1 Tab by mouth Daily (before breakfast). 30 Tab 5  
 multivitamin (ONE A DAY) tablet Take 1 Tab by mouth daily.  coenzyme q10 (CO Q-10) 10 mg cap Take  by mouth.  cholecalciferol (VITAMIN D3) 1,000 unit tablet Take  by mouth daily.  alirocumab (PRALUENT PEN) 75 mg/mL injector pen 1 mL by SubCUTAneous route Once every 2 weeks. 2 Syringe 11 Allergies Allergen Reactions  Atorvastatin Other (comments)  Sulfa (Sulfonamide Antibiotics) Rash Visit Vitals BP 90/60 (BP 1 Location: Right arm, BP Patient Position: Sitting) Pulse 72 Temp 98.1 °F (36.7 °C) (Oral) Resp 16 Ht 5' 3\" (1.6 m) Wt 127 lb 12.8 oz (58 kg) SpO2 99% BMI 22.64 kg/m² Physical Exam  
Constitutional: She is oriented to person, place, and time. She appears well-developed. No distress. HENT:  
Right Ear: Tympanic membrane is retracted. Left Ear: Tympanic membrane is retracted. Nose: Mucosal edema present. No rhinorrhea or septal deviation. Right sinus exhibits no maxillary sinus tenderness and no frontal sinus tenderness. Left sinus exhibits no maxillary sinus tenderness and no frontal sinus tenderness. Eyes: Conjunctivae are normal.  
Neck: Neck supple. No thyromegaly present. Cardiovascular: Normal rate, regular rhythm and normal heart sounds. Pulmonary/Chest: Effort normal and breath sounds normal. No respiratory distress. She has no wheezes. She has no rales. She exhibits no tenderness. Lymphadenopathy:  
  She has no cervical adenopathy. Neurological: She is alert and oriented to person, place, and time. Skin: Skin is warm. Psychiatric: She has a normal mood and affect. Lab Results Component Value Date/Time WBC 9.3 01/23/2018 12:00 AM  
 HGB 13.4 01/23/2018 12:00 AM  
 HCT 40.0 01/23/2018 12:00 AM  
 PLATELET 440 71/57/2556 12:00 AM  
 MCV 93 01/23/2018 12:00 AM  
 
Lab Results Component Value Date/Time Hemoglobin A1c 5.4 05/18/2017 09:14 AM  
 Glucose 87 11/26/2018 11:10 AM  
 LDL, calculated 145 (H) 11/26/2018 11:10 AM  
 Creatinine 0.79 11/26/2018 11:10 AM  
  
Lab Results Component Value Date/Time Cholesterol, total 224 (H) 11/26/2018 11:10 AM  
 HDL Cholesterol 54 11/26/2018 11:10 AM  
 LDL, calculated 145 (H) 11/26/2018 11:10 AM  
 Triglyceride 125 11/26/2018 11:10 AM  
 
Lab Results Component Value Date/Time ALT (SGPT) 20 11/26/2018 11:10 AM  
 AST (SGOT) 23 11/26/2018 11:10 AM  
 Alk. phosphatase 65 11/26/2018 11:10 AM  
 Bilirubin, direct 0.33 01/23/2018 12:00 AM  
 Bilirubin, total 0.6 11/26/2018 11:10 AM  
 Albumin 4.6 11/26/2018 11:10 AM  
 Protein, total 7.3 11/26/2018 11:10 AM  
 PLATELET 864 37/30/7578 12:00 AM  
 
Lab Results Component Value Date/Time GFR est non-AA 95 11/26/2018 11:10 AM  
 GFR est  11/26/2018 11:10 AM  
 Creatinine 0.79 11/26/2018 11:10 AM  
 BUN 14 11/26/2018 11:10 AM  
 Sodium 137 11/26/2018 11:10 AM  
 Potassium 4.1 11/26/2018 11:10 AM  
 Chloride 99 11/26/2018 11:10 AM  
 CO2 27 11/26/2018 11:10 AM  
 Magnesium 2.2 02/21/2018 08:14 AM  
 
Lab Results Component Value Date/Time  TSH 1.440 11/26/2018 11:11 AM  
 T3 Uptake 22 (L) 05/02/2018 04:06 PM  
 T4, Free 1.47 11/26/2018 11:11 AM  
 T4, Total 9.2 05/02/2018 04:06 PM  
  
Lab Results Component Value Date/Time Glucose 87 11/26/2018 11:10 AM  
   
 
 
ASSESSMENT and PLAN Diagnoses and all orders for this visit: 
 
1. Acquired hypothyroidism- TSH at goal. Followed by endocrinology will defer medication changes to covering Endocrinologist if indicated. Wrote Dr. Pili Briggs. 2. Familial hypercholesterolemia- Improved significantly with controlled hypothyroidism. Statin intolerant. Will recheck in 4-6 months. Hold PCKS9 inhibitor at this time. 3. Elevated liver enzymes- resolved. 4. Depression/ SAD- Well controlled. Continue current regimen. Patient Education:  Reviewed concept of depression as biochemical imbalance of neurotransmitters and rationale for treatment. Instructed patient to contact office or 911 promptly should condition worsen or any new symptoms appear and provided on-call telephone numbers. IF THE PATIENT HAS ANY SUICIDAL OR HOMICIDAL IDEATION, CALL THE OFFICE, DISCUSS WITH A SUPPORT MEMBER OR GO TO THE ER IMMEDIATELY. Patient was agreeable with this Follow-up Disposition: 
Return in about 6 months (around 5/28/2019) for Physical - 30 minute appointment. Medication risks/benefits/costs/interactions/alternatives discussed with patient. Alma Rosa Halsted  was given an after visit summary which includes diagnoses, current medications, & vitals. she expressed understanding with the diagnosis and plan.

## 2018-12-13 DIAGNOSIS — E03.9 ACQUIRED HYPOTHYROIDISM: ICD-10-CM

## 2019-03-13 DIAGNOSIS — E03.9 ACQUIRED HYPOTHYROIDISM: ICD-10-CM

## 2019-03-14 LAB
T4 FREE SERPL-MCNC: 1.58 NG/DL (ref 0.82–1.77)
TSH SERPL DL<=0.005 MIU/L-ACNC: 1.42 UIU/ML (ref 0.45–4.5)

## 2019-03-27 ENCOUNTER — OFFICE VISIT (OUTPATIENT)
Dept: ENDOCRINOLOGY | Age: 39
End: 2019-03-27

## 2019-03-27 VITALS
HEART RATE: 80 BPM | WEIGHT: 124.7 LBS | HEIGHT: 63 IN | BODY MASS INDEX: 22.09 KG/M2 | SYSTOLIC BLOOD PRESSURE: 105 MMHG | RESPIRATION RATE: 16 BRPM | OXYGEN SATURATION: 100 % | DIASTOLIC BLOOD PRESSURE: 70 MMHG

## 2019-03-27 DIAGNOSIS — E78.01 FAMILIAL HYPERCHOLESTEROLEMIA: ICD-10-CM

## 2019-03-27 DIAGNOSIS — E03.9 ACQUIRED HYPOTHYROIDISM: Primary | ICD-10-CM

## 2019-03-27 RX ORDER — LEVOTHYROXINE SODIUM 125 UG/1
125 TABLET ORAL
Qty: 90 TAB | Refills: 3 | Status: SHIPPED | OUTPATIENT
Start: 2019-03-27 | End: 2019-10-07 | Stop reason: SDUPTHER

## 2019-03-27 RX ORDER — LEVOTHYROXINE SODIUM 75 UG/1
75 TABLET ORAL
COMMUNITY
Start: 2018-03-13 | End: 2019-03-27 | Stop reason: DRUGHIGH

## 2019-03-27 NOTE — PROGRESS NOTES
Lab Results   Component Value Date/Time    TSH 1.420 03/13/2019 09:41 AM    T3 Uptake 22 (L) 05/02/2018 04:06 PM    T4, Free 1.58 03/13/2019 09:41 AM    T4, Total 9.2 05/02/2018 04:06 PM

## 2019-08-09 DIAGNOSIS — F32.89 OTHER DEPRESSION: ICD-10-CM

## 2019-08-09 RX ORDER — SERTRALINE HYDROCHLORIDE 50 MG/1
TABLET, FILM COATED ORAL
Qty: 60 TAB | Refills: 4 | OUTPATIENT
Start: 2019-08-09

## 2019-08-09 RX ORDER — SERTRALINE HYDROCHLORIDE 50 MG/1
TABLET, FILM COATED ORAL
Qty: 90 TAB | Refills: 0 | Status: SHIPPED | OUTPATIENT
Start: 2019-08-09 | End: 2019-10-07 | Stop reason: SDUPTHER

## 2019-08-11 DIAGNOSIS — F32.89 OTHER DEPRESSION: ICD-10-CM

## 2019-08-12 RX ORDER — SERTRALINE HYDROCHLORIDE 50 MG/1
TABLET, FILM COATED ORAL
Qty: 60 TAB | Refills: 4 | OUTPATIENT
Start: 2019-08-12

## 2019-09-27 ENCOUNTER — TELEPHONE (OUTPATIENT)
Dept: OBGYN CLINIC | Age: 39
End: 2019-09-27

## 2019-09-27 RX ORDER — NORETHINDRONE AND ETHINYL ESTRADIOL AND FERROUS FUMARATE 0.8-25(24)
1 KIT ORAL DAILY
Qty: 1 PACKAGE | Refills: 0 | Status: SHIPPED | OUTPATIENT
Start: 2019-09-27 | End: 2019-10-16 | Stop reason: SDUPTHER

## 2019-09-27 NOTE — TELEPHONE ENCOUNTER
Patient of SP. Calling to say that she made her next AE for 10/7/19. She will need one pack of pills to get her through until next visit.       Last AE  8/30/18        [facility]. AND [facility]

## 2019-10-07 ENCOUNTER — OFFICE VISIT (OUTPATIENT)
Dept: OBGYN CLINIC | Age: 39
End: 2019-10-07

## 2019-10-07 VITALS
WEIGHT: 132 LBS | DIASTOLIC BLOOD PRESSURE: 54 MMHG | SYSTOLIC BLOOD PRESSURE: 96 MMHG | BODY MASS INDEX: 23.39 KG/M2 | HEIGHT: 63 IN

## 2019-10-07 DIAGNOSIS — F32.89 OTHER DEPRESSION: ICD-10-CM

## 2019-10-07 DIAGNOSIS — N64.4 MASTALGIA: ICD-10-CM

## 2019-10-07 DIAGNOSIS — E03.9 HYPOTHYROIDISM, UNSPECIFIED TYPE: ICD-10-CM

## 2019-10-07 DIAGNOSIS — Z01.419 ENCOUNTER FOR GYNECOLOGICAL EXAMINATION WITHOUT ABNORMAL FINDING: ICD-10-CM

## 2019-10-07 DIAGNOSIS — N92.6 IRREGULAR MENSES: Primary | ICD-10-CM

## 2019-10-07 RX ORDER — LEVOTHYROXINE SODIUM 125 UG/1
125 TABLET ORAL
Qty: 90 TAB | Refills: 0 | Status: SHIPPED | OUTPATIENT
Start: 2019-10-07 | End: 2020-03-20 | Stop reason: SDUPTHER

## 2019-10-07 RX ORDER — SERTRALINE HYDROCHLORIDE 50 MG/1
TABLET, FILM COATED ORAL
Qty: 90 TAB | Refills: 0 | Status: SHIPPED | OUTPATIENT
Start: 2019-10-07 | End: 2020-02-19 | Stop reason: SDUPTHER

## 2019-10-07 RX ORDER — NORETHINDRONE AND ETHINYL ESTRADIOL 0.8-25(24)
KIT ORAL
Qty: 3 DOSE PACK | Refills: 4 | Status: SHIPPED | OUTPATIENT
Start: 2019-10-07 | End: 2020-12-14

## 2019-10-07 NOTE — PROGRESS NOTES
Annual exam    Tami Ackerman is a 45 y.o. G0 presenting for annual exam. Overall doing well on Kaitlib chewable OCPs. Regular monthly menses, light flow, occasionally has months where she does not have a menstrual cycle, though this is less common since she has had her thyroid disease treated. Only concern today is discomfort in right breast, mild, and cyclic in nature, no significant changes in the past year. Both pt's prior PCP and endocrinologist have left the area, so she is working to re-establish care with new providers. Has not had recent thyroid function testing and is interested in doing this today. Stable on sertraline for anxiety. Declines STD testing.  at [employer]. Went to [employer] for college, grew up in [place]. 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 Ob/Gyn Hx:  G0   Menarche- age 12  Menses- regular with OCPs, h/o irregular menses prior to tx of thyroid dz  Contraception- Kaitlib OCPs  STI- denies, declines testing today  ? SA- yes    Health maintenance:  Pap- 8/30/18 NILM HPV Neg  Mammo- 4-5 years ago, due to pain and lump, normal per patient, repeat at age 39  Gardasil- denies    Past Medical History:   Diagnosis Date    Hiatal hernia     Hypercholesterolemia     Hypothyroid     Rhabdomyolysis due to statin therapy 12/2017    After atorvastatin 40 mg     Rhabdomyolysis due to statin therapy 1/25/2018    Vitamin D deficiency        History reviewed. No pertinent surgical history.     Family History   Problem Relation Age of Onset    Elevated Lipids Father     Stroke Maternal Grandfather     Heart Attack Paternal Grandmother     Heart Attack Paternal Grandfather     Heart Attack Paternal Aunt     Cancer Mother         renal cell carcinoma    No Known Problems Brother     Diabetes Paternal Uncle     Hypertension Maternal Grandmother        Social History     Socioeconomic History    Marital status: SINGLE     Spouse name: Not on file    Number of children: Not on file    Years of education: Not on file    Highest education level: Not on file   Occupational History    Not on file   Social Needs    Financial resource strain: Not on file    Food insecurity:     Worry: Not on file     Inability: Not on file    Transportation needs:     Medical: Not on file     Non-medical: Not on file   Tobacco Use    Smoking status: Never Smoker    Smokeless tobacco: Never Used   Substance and Sexual Activity    Alcohol use: Yes     Alcohol/week: 2.0 standard drinks     Types: 1 Glasses of wine, 1 Shots of liquor per week     Comment: rare    Drug use: No    Sexual activity: Yes     Partners: Male     Birth control/protection: Pill   Lifestyle    Physical activity:     Days per week: Not on file     Minutes per session: Not on file    Stress: Not on file   Relationships    Social connections:     Talks on phone: Not on file     Gets together: Not on file     Attends Restorationism service: Not on file     Active member of club or organization: Not on file     Attends meetings of clubs or organizations: Not on file     Relationship status: Not on file    Intimate partner violence:     Fear of current or ex partner: Not on file     Emotionally abused: Not on file     Physically abused: Not on file     Forced sexual activity: Not on file   Other Topics Concern    Not on file   Social History Narrative    Pt is  at [employer]. She is from [place]. Went to [employer] for college (arts degree). Then went to [employer] for specialized animal training degree. Current Outpatient Medications   Medication Sig Dispense Refill    noreth-ethinyl estradiol-iron (KAITLIB FE) 0.8mg-25mcg(24) and 75 mg (4) chew Take 1 Tab by mouth daily. 1 Package 0    sertraline (ZOLOFT) 50 mg tablet TAKE 1 TABLET BY MOUTH EVERY DAY 90 Tab 0    levothyroxine (SYNTHROID) 125 mcg tablet Take 1 Tab by mouth Daily (before breakfast).  90 Tab 3    KAITLIB FE 0.8mg-25mcg(24) and 75 mg (4) chew TAKE 1 TABLET BY MOUTH EVERY DAY  4    alirocumab (PRALUENT PEN) 75 mg/mL injector pen 1 mL by SubCUTAneous route Once every 2 weeks. 2 Syringe 11    multivitamin (ONE A DAY) tablet Take 1 Tab by mouth daily.  coenzyme q10 (CO Q-10) 10 mg cap Take  by mouth.  cholecalciferol (VITAMIN D3) 1,000 unit tablet Take  by mouth daily.          Allergies   Allergen Reactions    Atorvastatin Other (comments)    Sulfa (Sulfonamide Antibiotics) Rash       Review of Systems - History obtained from the patient  Constitutional: negative for weight loss, fever, night sweats  HEENT: negative for hearing loss, earache, congestion, snoring, sorethroat  CV: negative for chest pain, palpitations, edema  Resp: negative for cough, shortness of breath, wheezing  GI: negative for change in bowel habits, abdominal pain, black or bloody stools  : negative for frequency, dysuria, hematuria, vaginal discharge  MSK: negative for back pain, joint pain, muscle pain  Breast: negative for breast lumps, nipple discharge, galactorrhea, +right breast tenderness  Skin :negative for itching, rash, hives  Neuro: negative for dizziness, headache, confusion, weakness  Psych: negative for anxiety, depression, change in mood  Heme/lymph: negative for bleeding, bruising, pallor    Physical Exam    Visit Vitals  BP 96/54 (BP 1 Location: Left arm, BP Patient Position: Sitting)   Ht 5' 3\" (1.6 m)   Wt 132 lb (59.9 kg)   BMI 23.38 kg/m²       Constitutional  · Appearance: well-nourished, well developed, alert, in no acute distress    HENT  · Head and Face: appears normal    Neck  · Inspection/Palpation: normal appearance, no masses or tenderness  · Lymph Nodes: no lymphadenopathy present  · Thyroid: gland size normal, nontender, no nodules or masses present on palpation    Chest  · Respiratory Effort: non-labored breathing  · Auscultation: CTAB, normal breath sounds    Cardiovascular  · Heart:  · Auscultation: regular rate and rhythm without murmur  · Extremities: no peripheral edema    Breasts  · Inspection of Breasts: breasts symmetrical, no skin changes, no discharge present, nipple appearance normal, no skin retraction present  · Palpation of Breasts and Axillae: no masses present on palpation, mildly tender upper outer quadrant of right breast/chest wall, no appreciable mass/lesion/abnormality at this location, unchanged from exam at visit last year  · Axillary Lymph Nodes: no lymphadenopathy present    Gastrointestinal  · Abdominal Examination: abdomen non-tender to palpation, normal bowel sounds, no masses present  · Liver and spleen: no hepatomegaly present, spleen not palpable  · Hernias: no hernias identified    Genitourinary  · External Genitalia: normal appearance for age, no discharge present, no tenderness present, no inflammatory lesions present, no masses present, no atrophy present  · Vagina: normal vaginal vault without central or paravaginal defects, no discharge present, no inflammatory lesions present, no masses present  · Bladder: non-tender to palpation  · Urethra: appears normal  · Cervix: normal   · Uterus: normal size, shape and consistency  · Adnexa: no adnexal tenderness present, no adnexal masses present  · Perineum: perineum within normal limits, no evidence of trauma, no rashes or skin lesions present    Skin  · General Inspection: no rash, no lesions identified    Neurologic/Psychiatric  · Mental Status:  · Orientation: grossly oriented to person, place and time  · Mood and Affect: mood normal, affect appropriate      Assessment/Plan:  45 y.o. G0 presenting for annual exam. Overall doing well. +right breast mastalgia.     Health Maintenance:  -counseled re: diet, exercise, healthy lifestyle  -pap/HPV utd, repeat cotesting 2023  -declines STI testing  -mammo age 36  -refill Kaitlib OCPs, sertraline, and synthroid until able to be seen by PCP/endocrine  -check TFTs today     Mastalgia: right breast, benign exam, suspected chest wall pain on exam (reproducible)  -reassurance provided  -advised supportive bra, cool compresses, tylenol/ibuprofen, Vivien/primrose oil, etc  -low threshold for referral to breast center for any changes    RTC: 1 year for AE or sooner prn    Tatiana Vasquez MD  10/7/2019  2:39 PM

## 2019-10-07 NOTE — PATIENT INSTRUCTIONS

## 2019-10-08 LAB
T4 FREE SERPL-MCNC: 1.37 NG/DL (ref 0.82–1.77)
TSH SERPL DL<=0.005 MIU/L-ACNC: 0.99 UIU/ML (ref 0.45–4.5)

## 2019-10-16 RX ORDER — NORETHINDRONE AND ETHINYL ESTRADIOL 0.8-25(24)
KIT ORAL
Qty: 3 DOSE PACK | Refills: 4 | Status: SHIPPED | OUTPATIENT
Start: 2019-10-16 | End: 2020-02-06 | Stop reason: SDUPTHER

## 2019-10-22 NOTE — TELEPHONE ENCOUNTER
Left Message for a return phone call.  Need to give lab results Imiquimod Counseling:  I discussed with the patient the risks of imiquimod including but not limited to erythema, scaling, itching, weeping, crusting, and pain.  Patient understands that the inflammatory response to imiquimod is variable from person to person and was educated regarded proper titration schedule.  If flu-like symptoms develop, patient knows to discontinue the medication and contact us.

## 2020-02-06 ENCOUNTER — OFFICE VISIT (OUTPATIENT)
Dept: FAMILY MEDICINE CLINIC | Age: 40
End: 2020-02-06

## 2020-02-06 VITALS
RESPIRATION RATE: 18 BRPM | HEIGHT: 63 IN | WEIGHT: 138 LBS | TEMPERATURE: 98.7 F | SYSTOLIC BLOOD PRESSURE: 106 MMHG | BODY MASS INDEX: 24.45 KG/M2 | OXYGEN SATURATION: 98 % | DIASTOLIC BLOOD PRESSURE: 72 MMHG | HEART RATE: 72 BPM

## 2020-02-06 DIAGNOSIS — Z00.00 ROUTINE GENERAL MEDICAL EXAMINATION AT A HEALTH CARE FACILITY: Primary | ICD-10-CM

## 2020-02-06 DIAGNOSIS — E55.9 VITAMIN D INSUFFICIENCY: ICD-10-CM

## 2020-02-06 DIAGNOSIS — F32.5 DEPRESSION, MAJOR, IN REMISSION (HCC): ICD-10-CM

## 2020-02-06 DIAGNOSIS — Z76.89 ESTABLISHING CARE WITH NEW DOCTOR, ENCOUNTER FOR: ICD-10-CM

## 2020-02-06 DIAGNOSIS — N60.21 FIBROADENOSIS OF BREAST, RIGHT: ICD-10-CM

## 2020-02-06 DIAGNOSIS — E78.01 FAMILIAL HYPERCHOLESTEROLEMIA: ICD-10-CM

## 2020-02-06 PROBLEM — F33.8 SEASONAL AFFECTIVE DISORDER (HCC): Status: RESOLVED | Noted: 2017-01-26 | Resolved: 2020-02-06

## 2020-02-06 NOTE — PATIENT INSTRUCTIONS

## 2020-02-06 NOTE — ASSESSMENT & PLAN NOTE
Well Controlled, based on history, physical exam and review of pertinent labs, studies and medications; meds reconciled; continue current treatment plan, lifestyle modifications recommended, medication compliance emphasized. Key Psychotherapeutic Meds             sertraline (ZOLOFT) 50 mg tablet (Taking) TAKE 1 TABLET BY MOUTH EVERY DAY        Other [address] [place] [address]     The patient is on no other behavioral health meds.         Lab Results   Component Value Date/Time    Sodium 137 11/26/2018 11:10 AM    Creatinine 0.79 11/26/2018 11:10 AM    TSH 0.994 10/07/2019 02:39 PM    ALT (SGPT) 20 11/26/2018 11:10 AM    AST (SGOT) 23 11/26/2018 11:10 AM

## 2020-02-06 NOTE — PROGRESS NOTES
HISTORY OF PRESENT ILLNESS  Jake Sosa is a 44 y.o. female. She is a new patient and is here to establish care. Previous followed by Dr Arias Gloria The following sections were reviewed & updated as appropriate: PMH, PL, PSH, and SH. Seen for physical. PMH significant for hypothyroid, dyslipidemia, depression    HPI  Health Maintenance  Immunizations:    Influenza: not UTD - she declines. Tetanus: up to date. Gardasil: n/a. Cancer screening:   Cervical: reviewed guidelines, UTD. Breast: reviewed guidelines, reviewed SBE with her, UTD    Endocrine Review  Patient is seen for followup of hypothyroidism. Started after she was very sick with strep throat and had Flu shot. Had extreme myopathy, elevated Lipid profile, all her rheumatological work up was negative except highly abnormal thyroid profile. Since last visit: no changes. She reports medication compliance: all the time and is taking separate from all other meds. She reports the following concerns/problems/med side effects: none. Lab review: labs reviewed and discussed with patient. On Levothyroxine 125 mcg  Lab Results   Component Value Date/Time    TSH 0.994 10/07/2019 02:39 PM    T3 Uptake 22 (L) 05/02/2018 04:06 PM    T4, Free 1.37 10/07/2019 02:39 PM    T4, Total 9.2 05/02/2018 04:06 PM     Cardiovascular Review  The patient has hyperlipidemia. She reports no TIA's, no chest pain on exertion, no dyspnea on exertion, no swelling of ankles, no orthostatic dizziness or lightheadedness, no orthopnea or paroxysmal nocturnal dyspnea, no palpitations, no muscle aches or pain. Diet and Lifestyle: generally follows a low fat low cholesterol diet, generally follows a low sodium diet, exercises regularly, nonsmoker. Lab review: labs reviewed and discussed with patient.     Medicines:not on lalo medicine due to elevated CPK in past  Lab Results   Component Value Date/Time    Cholesterol, total 224 (H) 11/26/2018 11:10 AM    HDL Cholesterol 54 11/26/2018 11:10 AM    LDL, calculated 145 (H) 11/26/2018 11:10 AM    VLDL, calculated 25 11/26/2018 11:10 AM    Triglyceride 125 11/26/2018 11:10 AM             Patient Care Team:  Jorge Lagunas MD as PCP - General (Family Practice)  Karen Collins MD as PCP - [facility]  Mamie Bain MD (Cardiology)  Johanne Oneill MD (Internal Medicine)  Kolton Lynn NP (Nurse Practitioner)       The following sections were reviewed & updated as appropriate: PMH, PSH, FH, and SH. Review of Systems   Constitutional: Negative for chills, fever and malaise/fatigue. HENT: Negative for congestion, ear pain, sore throat and tinnitus. Eyes: Negative for blurred vision, double vision, pain and discharge. Respiratory: Negative for cough, shortness of breath and wheezing. Cardiovascular: Negative for chest pain, palpitations and leg swelling. Gastrointestinal: Negative for abdominal pain, blood in stool, constipation, diarrhea, nausea and vomiting. Genitourinary: Negative for dysuria, frequency, hematuria and urgency. Right side breast pain   Musculoskeletal: Negative for back pain, joint pain and myalgias. Skin: Negative for rash. Neurological: Negative for dizziness, tremors, seizures and headaches. Endo/Heme/Allergies: Negative for polydipsia. Does not bruise/bleed easily. Psychiatric/Behavioral: Negative for depression and substance abuse. The patient is not nervous/anxious. Physical Exam  Vitals signs and nursing note reviewed. Constitutional:       Appearance: She is well-developed. HENT:      Head: Normocephalic and atraumatic. Right Ear: External ear normal.      Left Ear: External ear normal.      Nose: Nose normal.   Eyes:      General: No scleral icterus. Conjunctiva/sclera: Conjunctivae normal.      Pupils: Pupils are equal, round, and reactive to light. Neck:      Musculoskeletal: Normal range of motion and neck supple. Thyroid: No thyromegaly. Vascular: No JVD. Cardiovascular:      Rate and Rhythm: Normal rate and regular rhythm. Heart sounds: Normal heart sounds. No murmur. No friction rub. No gallop. Pulmonary:      Effort: Pulmonary effort is normal.      Breath sounds: Normal breath sounds. No wheezing or rales. Chest:      Chest wall: No tenderness. Breasts: Breasts are symmetrical.         Right: Tenderness present. No inverted nipple, mass, nipple discharge or skin change. Left: No inverted nipple, mass, nipple discharge, skin change or tenderness. Abdominal:      General: Bowel sounds are normal. There is no distension. Palpations: Abdomen is soft. There is no mass. Tenderness: There is no abdominal tenderness. Musculoskeletal: Normal range of motion. General: No tenderness. Lymphadenopathy:      Cervical: No cervical adenopathy. Skin:     General: Skin is warm and dry. Findings: No rash. Neurological:      Mental Status: She is alert and oriented to person, place, and time. Cranial Nerves: No cranial nerve deficit. Deep Tendon Reflexes: Reflexes are normal and symmetric. Psychiatric:         Behavior: Behavior normal.         Thought Content: Thought content normal.         Judgment: Judgment normal.         ASSESSMENT and PLAN  Diagnoses and all orders for this visit:    1. Routine general medical examination at a health care facility  -     CBC WITH AUTOMATED DIFF  -     METABOLIC PANEL, COMPREHENSIVE  -     HEMOGLOBIN A1C WITH EAG  -     TSH AND FREE T4  -     URINALYSIS W/ RFLX MICROSCOPIC  -     LIPID PANEL    2. Establishing care with new doctor, encounter for    3. Familial hypercholesterolemia    4. Vitamin D insufficiency  -     VITAMIN D, 25 HYDROXY    5.  Depression, major, in remission [facility])  Assessment & Plan:  Well Controlled, based on history, physical exam and review of pertinent labs, studies and medications; meds reconciled; continue current treatment plan, lifestyle modifications recommended, medication compliance emphasized. Key Psychotherapeutic Meds             sertraline (ZOLOFT) 50 mg tablet (Taking) TAKE 1 TABLET BY MOUTH EVERY DAY        Other [address] [facility]     The patient is on no other behavioral health meds. Lab Results   Component Value Date/Time    Sodium 137 11/26/2018 11:10 AM    Creatinine 0.79 11/26/2018 11:10 AM    TSH 0.994 10/07/2019 02:39 PM    ALT (SGPT) 20 11/26/2018 11:10 AM    AST (SGOT) 23 11/26/2018 11:10 AM         6. Fibroadenosis of breast, right  discussed low fat,low salt diet . Recommended OTC Vitamin E and Primerose oil  Discussed lifestyle issues and health guidance given  Patient was given an after visit summary which includes diagnoses, vital signs, current medications, instructions and references & authorized prescriptions . Results of labs will be conveyed to patient, once available. Pt verbalized instructions I provided and expressed understanding of discussion that was held today. Follow-up and Dispositions    · Return in about 6 months (around 8/6/2020) for follow up, fasting.

## 2020-02-06 NOTE — PROGRESS NOTES
Chief Complaint   Patient presents with    New Patient     Patient is in the office today to establish care with new provider.  Thyroid Problem     Follow up. 1. Have you been to the ER, urgent care clinic since your last visit? Hospitalized since your last visit? No    2. Have you seen or consulted any other health care providers outside of the [address] [place] [address] since your last visit? Include any pap smears or colon screening.  No

## 2020-02-11 LAB
25(OH)D3+25(OH)D2 SERPL-MCNC: 19 NG/ML (ref 30–100)
ALBUMIN SERPL-MCNC: 4.1 G/DL (ref 3.8–4.8)
ALBUMIN/GLOB SERPL: 1.5 {RATIO} (ref 1.2–2.2)
ALP SERPL-CCNC: 63 IU/L (ref 39–117)
ALT SERPL-CCNC: 20 IU/L (ref 0–32)
APPEARANCE UR: ABNORMAL
AST SERPL-CCNC: 23 IU/L (ref 0–40)
BASOPHILS # BLD AUTO: 0 X10E3/UL (ref 0–0.2)
BASOPHILS NFR BLD AUTO: 1 %
BILIRUB SERPL-MCNC: 1.5 MG/DL (ref 0–1.2)
BILIRUB UR QL STRIP: NEGATIVE
BUN SERPL-MCNC: 14 MG/DL (ref 6–20)
BUN/CREAT SERPL: 17 (ref 9–23)
CALCIUM SERPL-MCNC: 8.7 MG/DL (ref 8.7–10.2)
CHLORIDE SERPL-SCNC: 103 MMOL/L (ref 96–106)
CHOLEST SERPL-MCNC: 229 MG/DL (ref 100–199)
CO2 SERPL-SCNC: 22 MMOL/L (ref 20–29)
COLOR UR: YELLOW
CREAT SERPL-MCNC: 0.83 MG/DL (ref 0.57–1)
EOSINOPHIL # BLD AUTO: 0.1 X10E3/UL (ref 0–0.4)
EOSINOPHIL NFR BLD AUTO: 2 %
ERYTHROCYTE [DISTWIDTH] IN BLOOD BY AUTOMATED COUNT: 11.6 % (ref 11.7–15.4)
EST. AVERAGE GLUCOSE BLD GHB EST-MCNC: 100 MG/DL
GLOBULIN SER CALC-MCNC: 2.8 G/DL (ref 1.5–4.5)
GLUCOSE SERPL-MCNC: 85 MG/DL (ref 65–99)
GLUCOSE UR QL: NEGATIVE
HBA1C MFR BLD: 5.1 % (ref 4.8–5.6)
HCT VFR BLD AUTO: 42.7 % (ref 34–46.6)
HDLC SERPL-MCNC: 45 MG/DL
HGB BLD-MCNC: 14.6 G/DL (ref 11.1–15.9)
HGB UR QL STRIP: NEGATIVE
IMM GRANULOCYTES # BLD AUTO: 0 X10E3/UL (ref 0–0.1)
IMM GRANULOCYTES NFR BLD AUTO: 0 %
INTERPRETATION, 910389: NORMAL
KETONES UR QL STRIP: NEGATIVE
LDLC SERPL CALC-MCNC: 164 MG/DL (ref 0–99)
LEUKOCYTE ESTERASE UR QL STRIP: NEGATIVE
LYMPHOCYTES # BLD AUTO: 2 X10E3/UL (ref 0.7–3.1)
LYMPHOCYTES NFR BLD AUTO: 32 %
MCH RBC QN AUTO: 30.9 PG (ref 26.6–33)
MCHC RBC AUTO-ENTMCNC: 34.2 G/DL (ref 31.5–35.7)
MCV RBC AUTO: 90 FL (ref 79–97)
MICRO URNS: ABNORMAL
MONOCYTES # BLD AUTO: 0.4 X10E3/UL (ref 0.1–0.9)
MONOCYTES NFR BLD AUTO: 6 %
NEUTROPHILS # BLD AUTO: 3.7 X10E3/UL (ref 1.4–7)
NEUTROPHILS NFR BLD AUTO: 59 %
NITRITE UR QL STRIP: NEGATIVE
PH UR STRIP: 5.5 [PH] (ref 5–7.5)
PLATELET # BLD AUTO: 345 X10E3/UL (ref 150–450)
POTASSIUM SERPL-SCNC: 4.2 MMOL/L (ref 3.5–5.2)
PROT SERPL-MCNC: 6.9 G/DL (ref 6–8.5)
PROT UR QL STRIP: ABNORMAL
RBC # BLD AUTO: 4.73 X10E6/UL (ref 3.77–5.28)
SODIUM SERPL-SCNC: 138 MMOL/L (ref 134–144)
SP GR UR: 1.03 (ref 1–1.03)
T4 FREE SERPL-MCNC: 1.55 NG/DL (ref 0.82–1.77)
TRIGL SERPL-MCNC: 98 MG/DL (ref 0–149)
TSH SERPL DL<=0.005 MIU/L-ACNC: 1.1 UIU/ML (ref 0.45–4.5)
UROBILINOGEN UR STRIP-MCNC: 0.2 MG/DL (ref 0.2–1)
VLDLC SERPL CALC-MCNC: 20 MG/DL (ref 5–40)
WBC # BLD AUTO: 6.3 X10E3/UL (ref 3.4–10.8)

## 2020-02-11 NOTE — PROGRESS NOTES
Silvia Thomson,  I have reviewd your results  Thyroid profile is normal ,so no change in current dose  Blood count,kidney,average sugar ( diabetes screen) , urine results are normal  Liver function normal except elevated bilirubin, but that is chronically elevated, and most of times, it is benign and hereditary. Cholesterol numbers are not good. Your total and bad cholesterol both are up. due to your previous h/o myopathy and statin intolerance, will not recommend statin, but can consider Zetia ( non statin Rx)  Let me know if you agree and will send Rx to pharmacy  Let me know if you have any questions.   thanks

## 2020-02-18 ENCOUNTER — PATIENT MESSAGE (OUTPATIENT)
Dept: FAMILY MEDICINE CLINIC | Age: 40
End: 2020-02-18

## 2020-02-18 DIAGNOSIS — F32.89 OTHER DEPRESSION: ICD-10-CM

## 2020-02-19 RX ORDER — SERTRALINE HYDROCHLORIDE 50 MG/1
TABLET, FILM COATED ORAL
Qty: 90 TAB | Refills: 0 | Status: SHIPPED | OUTPATIENT
Start: 2020-02-19 | End: 2020-05-20

## 2020-02-19 NOTE — TELEPHONE ENCOUNTER
From: Lima Thornton  To: Helen Spears MD  Sent: 2/18/2020 9:59 PM EST  Subject: Prescription Question    Hi Dr. Mohit George,  I need a refill of my sertraline, please.  I wasn't sure whether to send the request through the Refill Request option because I thought that might go to Dr. Yasemin Hawkins, since she filled it for me last.  Thank you,  Coreen Zuniga

## 2020-03-03 ENCOUNTER — PATIENT MESSAGE (OUTPATIENT)
Dept: FAMILY MEDICINE CLINIC | Age: 40
End: 2020-03-03

## 2020-03-03 DIAGNOSIS — E78.01 FAMILIAL HYPERCHOLESTEROLEMIA: Primary | ICD-10-CM

## 2020-03-04 RX ORDER — EZETIMIBE 10 MG/1
10 TABLET ORAL DAILY
Qty: 90 TAB | Refills: 1 | Status: SHIPPED | OUTPATIENT
Start: 2020-03-04 | End: 2020-03-17 | Stop reason: SDUPTHER

## 2020-03-04 NOTE — TELEPHONE ENCOUNTER
From: Jessica Elmore  To: Zeyad Briscoe MD  Sent: 3/3/2020 8:23 PM EST  Subject: Non-Urgent Medical Question    Hi Dr. Chelsi Scott,  I sent messages on both 2/18 and 2/22 regarding my interest in starting Zetia. I will be leaving at the end of this month for a two week trip out of the country, so I would prefer not to begin the medication until after I return, but I still wanted to reach out again since I have not heard back from you. Please let me know if there is a better way for me to contact you in order to find out what the procedure will be once I start the medication (I believe you said we would need to keep a close eye on my blood chemistry). Hope all is well with you.   Thanks so much,  Celesta Alpers

## 2020-03-11 ENCOUNTER — DOCUMENTATION ONLY (OUTPATIENT)
Dept: FAMILY MEDICINE CLINIC | Age: 40
End: 2020-03-11

## 2020-03-17 DIAGNOSIS — Z78.9 STATIN INTOLERANCE: Primary | ICD-10-CM

## 2020-03-17 DIAGNOSIS — T46.6X5A RHABDOMYOLYSIS DUE TO STATIN THERAPY: ICD-10-CM

## 2020-03-17 DIAGNOSIS — E78.01 FAMILIAL HYPERCHOLESTEROLEMIA: ICD-10-CM

## 2020-03-17 DIAGNOSIS — M62.82 RHABDOMYOLYSIS DUE TO STATIN THERAPY: ICD-10-CM

## 2020-03-17 RX ORDER — EZETIMIBE 10 MG/1
10 TABLET ORAL DAILY
Qty: 90 TAB | Refills: 1 | Status: SHIPPED | OUTPATIENT
Start: 2020-03-17 | End: 2020-12-02 | Stop reason: SDUPTHER

## 2020-03-18 ENCOUNTER — PATIENT MESSAGE (OUTPATIENT)
Dept: FAMILY MEDICINE CLINIC | Age: 40
End: 2020-03-18

## 2020-03-18 ENCOUNTER — DOCUMENTATION ONLY (OUTPATIENT)
Dept: FAMILY MEDICINE CLINIC | Age: 40
End: 2020-03-18

## 2020-03-20 ENCOUNTER — PATIENT MESSAGE (OUTPATIENT)
Dept: FAMILY MEDICINE CLINIC | Age: 40
End: 2020-03-20

## 2020-03-20 RX ORDER — LEVOTHYROXINE SODIUM 125 UG/1
125 TABLET ORAL
Qty: 90 TAB | Refills: 0 | Status: SHIPPED | OUTPATIENT
Start: 2020-03-20 | End: 2020-06-24 | Stop reason: SDUPTHER

## 2020-03-20 NOTE — TELEPHONE ENCOUNTER
From: Alexsandra Marley  To: Reyes Owens MD  Sent: 3/20/2020 8:34 AM EDT  Subject: Prescription Question    Hi Dr. David Bolanos,  Could you please submit a refill request for my Levothyroxine?   Thanks so much,  Vivian Lara

## 2020-03-30 ENCOUNTER — NURSE TRIAGE (OUTPATIENT)
Dept: OTHER | Facility: CLINIC | Age: 40
End: 2020-03-30

## 2020-03-30 NOTE — TELEPHONE ENCOUNTER
Reason for Disposition   [1] Cough occurs AND [2] within 14 days of COVID-19 EXPOSURE    Answer Assessment - Initial Assessment Questions  1. CONFIRMED CASE: \"Who is the person with the confirmed COVID-19 infection that you were exposed to? \"      unknown  2. PLACE of CONTACT: \"Where were you when you were exposed to COVID-19  (coronavirus disease 2019)? \" (e.g., city, state, country)      unknown  3. TYPE of CONTACT: \"How much contact was there? \" (e.g., live in same house, work in same office, same school)      unknown  4. DATE of CONTACT: \"When did you have contact with a coronavirus patient? \" (e.g., days)      unknown  5. DURATION of CONTACT: \"How long were you in contact with the COVID-19 (coronavirus disease) patient? \" (e.g., a few seconds, passed by person, a few minutes, live with the patient)      unknown  6. SYMPTOMS: \"Do you have any symptoms? \" (e.g., fever, cough, breathing difficulty)      Cough at night  7. PREGNANCY OR POSTPARTUM: \"Is there any chance you are pregnant? \" \"When was your last menstrual period? \" \"Did you deliver in the last 2 weeks? \"      no  8. HIGH RISK: \"Do you have any heart or lung problems? Do you have a weakened immune system? \" (e.g., CHF, COPD, asthma, HIV positive, chemotherapy, renal failure, diabetes mellitus, sickle cell anemia)      none    Protocols used: CORONAVIRUS (COVID-19) EXPOSURE-ADULT-  Call via [employer] line-concerned abo

## 2020-05-20 DIAGNOSIS — F32.89 OTHER DEPRESSION: ICD-10-CM

## 2020-05-20 RX ORDER — SERTRALINE HYDROCHLORIDE 50 MG/1
TABLET, FILM COATED ORAL
Qty: 90 TAB | Refills: 0 | Status: SHIPPED | OUTPATIENT
Start: 2020-05-20 | End: 2020-08-13

## 2020-06-25 RX ORDER — LEVOTHYROXINE SODIUM 125 UG/1
125 TABLET ORAL
Qty: 90 TAB | Refills: 0 | Status: SHIPPED | OUTPATIENT
Start: 2020-06-25 | End: 2020-09-14 | Stop reason: SDUPTHER

## 2020-08-06 ENCOUNTER — OFFICE VISIT (OUTPATIENT)
Dept: FAMILY MEDICINE CLINIC | Age: 40
End: 2020-08-06
Payer: COMMERCIAL

## 2020-08-06 VITALS
DIASTOLIC BLOOD PRESSURE: 76 MMHG | TEMPERATURE: 99.1 F | WEIGHT: 130 LBS | HEART RATE: 75 BPM | OXYGEN SATURATION: 98 % | RESPIRATION RATE: 18 BRPM | BODY MASS INDEX: 23.04 KG/M2 | HEIGHT: 63 IN | SYSTOLIC BLOOD PRESSURE: 120 MMHG

## 2020-08-06 DIAGNOSIS — F32.5 DEPRESSION, MAJOR, IN REMISSION (HCC): ICD-10-CM

## 2020-08-06 DIAGNOSIS — Z20.3 RABIES CONTACT: ICD-10-CM

## 2020-08-06 DIAGNOSIS — E55.9 VITAMIN D INSUFFICIENCY: ICD-10-CM

## 2020-08-06 DIAGNOSIS — E03.9 ACQUIRED HYPOTHYROIDISM: ICD-10-CM

## 2020-08-06 DIAGNOSIS — E78.01 FAMILIAL HYPERCHOLESTEROLEMIA: Primary | ICD-10-CM

## 2020-08-06 DIAGNOSIS — R50.9 FEVER, UNSPECIFIED: ICD-10-CM

## 2020-08-06 PROCEDURE — 99214 OFFICE O/P EST MOD 30 MIN: CPT | Performed by: FAMILY MEDICINE

## 2020-08-06 NOTE — PATIENT INSTRUCTIONS
Learning About Coronavirus [phone]) Coronavirus (SGSIA-65): Overview What is coronavirus (JASQQ-15)? The coronavirus disease (COVID-19) is caused by a virus. It is an illness that was first found in [place], [place], in December 2019. It has since spread worldwide. The virus can cause fever, cough, and trouble breathing. In severe cases, it can cause pneumonia and make it hard to breathe without help. It can cause death. Coronaviruses are a large group of viruses. They cause the common cold. They also cause more serious illnesses like Middle East respiratory syndrome (MERS) and severe acute respiratory syndrome (SARS). COVID-19 is caused by a novel coronavirus. That means it's a new type that has not been seen in people before. This virus spreads person-to-person through droplets from coughing and sneezing. It can also spread when you are close to someone who is infected. And it can spread when you touch something that has the virus on it, such as a doorknob or a tabletop. What can you do to protect yourself from coronavirus (COVID-19)? The best way to protect yourself from getting sick is to: · Avoid areas where there is an outbreak. · Avoid contact with people who may be infected. · Wash your hands often with soap or alcohol-based hand sanitizers. · Avoid crowds and try to stay at least 6 feet away from other people. · Wash your hands often, especially after you cough or sneeze. Use soap and water, and scrub for at least 20 seconds. If soap and water aren't available, use an alcohol-based hand . · Avoid touching your mouth, nose, and eyes. What can you do to avoid spreading the virus to others? To help avoid spreading the virus to others: 
· Cover your mouth with a tissue when you cough or sneeze. Then throw the tissue in the trash. · Use a disinfectant to clean things that you touch often. · Wear a cloth face cover if you have to go to public areas. · Stay home if you are sick or have been exposed to the virus. Don't go to school, work, or public areas. And don't use public transportation, ride-shares, or taxis unless you have no choice. · If you are sick: 
? Leave your home only if you need to get medical care. But call the doctor's office first so they know you're coming. And wear a face cover. ? Wear the face cover whenever you're around other people. It can help stop the spread of the virus when you cough or sneeze. ? Clean and disinfect your home every day. Use household  and disinfectant wipes or sprays. Take special care to clean things that you grab with your hands. These include doorknobs, remote controls, phones, and handles on your refrigerator and microwave. And don't forget countertops, tabletops, bathrooms, and computer keyboards. When to call for help [id number] anytime you think you may need emergency care. For example, call if: 
· You have severe trouble breathing. (You can't talk at all.) · You have constant chest pain or pressure. · You are severely dizzy or lightheaded. · You are confused or can't think clearly. · Your face and lips have a blue color. · You pass out (lose consciousness) or are very hard to wake up. Call your doctor now if you develop symptoms such as: · Shortness of breath. · Fever. · Cough. If you need to get care, call ahead to the doctor's office for instructions before you go. Make sure you wear a face cover to prevent exposing other people to the virus. Where can you get the latest information? The following health organizations are tracking and studying this virus. Their websites contain the most up-to-date information. Rosana Lockett also learn what to do if you think you may have been exposed to the virus. · U.S. Centers for Disease Control and Prevention (CDC): The CDC provides updated news about the disease and travel advice.  The website also tells you how to prevent the spread of infection. www.cdc.gov · World Health Organization [facility]): WHO offers information about the virus outbreaks. WHO also has travel advice. www.who.int 
Current as of: May 8, 2020               Content Version: 12.5 © 2006-2020 Healthwise[employer]. Care instructions adapted under license by [employer] (which disclaims liability or warranty for this information). If you have questions about a medical condition or this instruction, always ask your healthcare professional. [address] any warranty or liability for your use of this information.

## 2020-08-06 NOTE — ASSESSMENT & PLAN NOTE
Well Controlled, based on history, physical exam and review of pertinent labs, studies and medications; meds reconciled; continue current treatment plan, lifestyle modifications recommended, medication compliance emphasized. Key Psychotherapeutic Meds             sertraline (ZOLOFT) 50 mg tablet (Taking) TAKE 1 TABLET BY MOUTH EVERY DAY        Other [address]     The patient is on no other behavioral health meds.         Lab Results   Component Value Date/Time    Sodium 138 02/10/2020 09:34 AM    Creatinine 0.83 02/10/2020 09:34 AM    TSH 1.100 02/10/2020 09:34 AM    WBC 6.3 02/10/2020 09:34 AM    ALT (SGPT) 20 02/10/2020 09:34 AM

## 2020-08-06 NOTE — PROGRESS NOTES
Chief Complaint   Patient presents with    Thyroid Problem     6 month follow up.  Cholesterol Problem     1. Have you been to the ER, urgent care clinic since your last visit? Hospitalized since your last visit? No    2. Have you seen or consulted any other health care providers outside of the [address] [place] [address] since your last visit? Include any pap smears or colon screening.  No

## 2020-08-06 NOTE — PROGRESS NOTES
HISTORY OF PRESENT ILLNESS  Estelita Truong is a 44 y.o. female. 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 Also c/o fatigue and low energy  HPI  Endocrine Review  Patient is seen for followup of hypothyroidism. Started after she was very sick with strep throat and had Flu shot. Had extreme myopathy, elevated Lipid profile, all her rheumatological work up was negative except highly abnormal thyroid profile. Since last visit: no changes. She reports medication compliance: all the time and is taking separate from all other meds. She reports the following concerns/problems/med side effects: none. Lab review: labs reviewed and discussed with patient. On Levothyroxine 125 mcg      Lab Results   Component Value Date/Time    TSH 1.100 02/10/2020 09:34 AM    T3 Uptake 22 (L) 05/02/2018 04:06 PM    T4, Free 1.55 02/10/2020 09:34 AM    T4, Total 9.2 05/02/2018 04:06 PM       Cardiovascular Review  The patient has hyperlipidemia. She reports no TIA's, no chest pain on exertion, no dyspnea on exertion, no swelling of ankles, no orthostatic dizziness or lightheadedness, no orthopnea or paroxysmal nocturnal dyspnea, no palpitations, no muscle aches or pain. Diet and Lifestyle: generally follows a low fat low cholesterol diet, generally follows a low sodium diet, exercises regularly, nonsmoker. Lab review: labs reviewed and discussed with patient.     Medicines:not on statin due to elevated CPK in past, was started on Ezetimibe , taking for last 2 months, no side effects so far  Lab Results   Component Value Date/Time    Cholesterol, total 229 (H) 02/10/2020 09:34 AM    HDL Cholesterol 45 02/10/2020 09:34 AM    LDL, calculated 164 (H) 02/10/2020 09:34 AM    VLDL, calculated 20 02/10/2020 09:34 AM    Triglyceride 98 02/10/2020 09:34 AM     Had low vitamin d on last visit,taking OTC vitamin D    Lab Results Component Value Date/Time    VITAMIN D, 25-HYDROXY 19.0 (L) 02/10/2020 09:34 AM           Review of Systems   Constitutional: Negative for chills, fever and malaise/fatigue. HENT: Negative for congestion, ear pain, sore throat and tinnitus. Eyes: Negative for blurred vision, double vision, pain and discharge. Respiratory: Negative for cough, shortness of breath and wheezing. Cardiovascular: Negative for chest pain, palpitations and leg swelling. Gastrointestinal: Negative for abdominal pain, blood in stool, constipation, diarrhea, nausea and vomiting. Genitourinary: Negative for dysuria, frequency, hematuria and urgency. Musculoskeletal: Negative for back pain, joint pain and myalgias. Skin: Negative for rash. Neurological: Negative for dizziness, tremors, seizures and headaches. Endo/Heme/Allergies: Negative for polydipsia. Does not bruise/bleed easily. Psychiatric/Behavioral: Negative for depression and substance abuse. The patient is not nervous/anxious. Physical Exam  Vitals signs and nursing note reviewed. Constitutional:       Appearance: She is well-developed. She is not diaphoretic. HENT:      Head: Normocephalic and atraumatic. Right Ear: External ear normal.      Mouth/Throat:      Pharynx: No oropharyngeal exudate. Eyes:      General: No scleral icterus. Conjunctiva/sclera: Conjunctivae normal.      Pupils: Pupils are equal, round, and reactive to light. Neck:      Musculoskeletal: Normal range of motion and neck supple. Thyroid: No thyromegaly. Vascular: No JVD. Cardiovascular:      Rate and Rhythm: Normal rate and regular rhythm. Heart sounds: Normal heart sounds. No murmur. Pulmonary:      Effort: Pulmonary effort is normal.      Breath sounds: Normal breath sounds. No wheezing. Abdominal:      General: Bowel sounds are normal. There is no distension. Palpations: Abdomen is soft. There is no mass.    Musculoskeletal: Normal range of motion. General: No tenderness. Lymphadenopathy:      Cervical: No cervical adenopathy. Skin:     General: Skin is warm and dry. Findings: No rash. Neurological:      Mental Status: She is alert and oriented to person, place, and time. Cranial Nerves: No cranial nerve deficit. Deep Tendon Reflexes: Reflexes are normal and symmetric. Reflexes normal.         ASSESSMENT and PLAN  Diagnoses and all orders for this visit:    1. Familial hypercholesterolemia  -     METABOLIC PANEL, COMPREHENSIVE  -     LIPID PANEL    2. Vitamin D insufficiency  -     VITAMIN D, 25 HYDROXY    3. Depression, major, in remission [facility])  Assessment & Plan:  Well Controlled, based on history, physical exam and review of pertinent labs, studies and medications; meds reconciled; continue current treatment plan, lifestyle modifications recommended, medication compliance emphasized. Key Psychotherapeutic Meds             sertraline (ZOLOFT) 50 mg tablet (Taking) TAKE 1 TABLET BY MOUTH EVERY DAY        Other [address] [facility]     The patient is on no other behavioral health meds. Lab Results   Component Value Date/Time    Sodium 138 02/10/2020 09:34 AM    Creatinine 0.83 02/10/2020 09:34 AM    TSH 1.100 02/10/2020 09:34 AM    WBC 6.3 02/10/2020 09:34 AM    ALT (SGPT) 20 02/10/2020 09:34 AM         4. Fever, unspecified  -     URINALYSIS W/ RFLX MICROSCOPIC    5. Rabies contact  -     RABIES AB, IGG    6. Acquired hypothyroidism  -     TSH AND FREE T4    Discussed lifestyle issues and health guidance given  Patient was given an after visit summary which includes diagnoses, vital signs, current medications, instructions and references & authorized prescriptions . Results of labs will be conveyed to patient, once available. Pt verbalized instructions I provided and expressed understanding of discussion that was held today.   Follow-up and Dispositions    · Return in about 6 months (around 2/6/2021) for fasting, physical.

## 2020-08-13 ENCOUNTER — PATIENT MESSAGE (OUTPATIENT)
Dept: FAMILY MEDICINE CLINIC | Age: 40
End: 2020-08-13

## 2020-08-13 DIAGNOSIS — F32.89 OTHER DEPRESSION: ICD-10-CM

## 2020-08-13 RX ORDER — SERTRALINE HYDROCHLORIDE 50 MG/1
TABLET, FILM COATED ORAL
Qty: 90 TAB | Refills: 0 | Status: SHIPPED | OUTPATIENT
Start: 2020-08-13 | End: 2021-02-10 | Stop reason: SDUPTHER

## 2020-08-13 RX ORDER — SERTRALINE HYDROCHLORIDE 50 MG/1
TABLET, FILM COATED ORAL
Qty: 90 TAB | Refills: 0 | Status: SHIPPED | OUTPATIENT
Start: 2020-08-13 | End: 2020-08-13 | Stop reason: SDUPTHER

## 2020-08-30 ENCOUNTER — PATIENT MESSAGE (OUTPATIENT)
Dept: FAMILY MEDICINE CLINIC | Age: 40
End: 2020-08-30

## 2020-09-04 NOTE — TELEPHONE ENCOUNTER
From: Jose Rapp  To: Edi Jiménez MD  Sent: 8/30/2020 10:56 PM EDT  Subject: Test Results Question    Hi Dr. Shin Ovalle,  I know that I asked once already, but could you please look into why my test results have not come back yet? It's been more than three weeks since the samples were taken.   Thank you,  Gia Spencer

## 2020-09-08 NOTE — TELEPHONE ENCOUNTER
I have already contacted Curtis Landry, from [address] [place] [address] and also have given detail to Arnel Gracia to look into matter.   thanks

## 2020-09-11 LAB
25(OH)D3+25(OH)D2 SERPL-MCNC: 40.7 NG/ML (ref 30–100)
ALBUMIN SERPL-MCNC: 4.3 G/DL (ref 3.8–4.8)
ALBUMIN/GLOB SERPL: 1.6 {RATIO} (ref 1.2–2.2)
ALP SERPL-CCNC: 71 IU/L (ref 39–117)
ALT SERPL-CCNC: 21 IU/L (ref 0–32)
APPEARANCE UR: CLEAR
AST SERPL-CCNC: 27 IU/L (ref 0–40)
BILIRUB SERPL-MCNC: 0.9 MG/DL (ref 0–1.2)
BILIRUB UR QL STRIP: NEGATIVE
BUN SERPL-MCNC: 18 MG/DL (ref 6–20)
BUN/CREAT SERPL: 20 (ref 9–23)
CALCIUM SERPL-MCNC: 9.2 MG/DL (ref 8.7–10.2)
CHLORIDE SERPL-SCNC: 102 MMOL/L (ref 96–106)
CHOLEST SERPL-MCNC: 185 MG/DL (ref 100–199)
CO2 SERPL-SCNC: 23 MMOL/L (ref 20–29)
COLOR UR: YELLOW
CREAT SERPL-MCNC: 0.92 MG/DL (ref 0.57–1)
GLOBULIN SER CALC-MCNC: 2.7 G/DL (ref 1.5–4.5)
GLUCOSE SERPL-MCNC: 84 MG/DL (ref 65–99)
GLUCOSE UR QL: NEGATIVE
HDLC SERPL-MCNC: 51 MG/DL
HGB UR QL STRIP: NEGATIVE
INTERPRETATION, 910389: NORMAL
KETONES UR QL STRIP: NEGATIVE
LDLC SERPL CALC-MCNC: 120 MG/DL (ref 0–99)
LEUKOCYTE ESTERASE UR QL STRIP: NEGATIVE
MICRO URNS: NORMAL
NITRITE UR QL STRIP: NEGATIVE
PH UR STRIP: 6 [PH] (ref 5–7.5)
POTASSIUM SERPL-SCNC: 4.3 MMOL/L (ref 3.5–5.2)
PROT SERPL-MCNC: 7 G/DL (ref 6–8.5)
PROT UR QL STRIP: NEGATIVE
RABV NAB SER NT-ACNC: NORMAL IU/ML
SODIUM SERPL-SCNC: 140 MMOL/L (ref 134–144)
SP GR UR: 1.03 (ref 1–1.03)
T4 FREE SERPL-MCNC: 1.49 NG/DL (ref 0.82–1.77)
TRIGL SERPL-MCNC: 71 MG/DL (ref 0–149)
TSH SERPL DL<=0.005 MIU/L-ACNC: 0.77 UIU/ML (ref 0.45–4.5)
UROBILINOGEN UR STRIP-MCNC: 0.2 MG/DL (ref 0.2–1)
VLDLC SERPL CALC-MCNC: 14 MG/DL (ref 5–40)

## 2020-09-11 NOTE — PROGRESS NOTES
Frantz sandoval,  Finally we have received your results  Rabies titer is positive, confirming immunity  Significant improvement in cholesterol with Ezetimibe. Will continue. All other results including thyroid, kidney,liver, vitamin d are very reassuring  No change in medicines  Let me know if you have any questions.   thanks

## 2020-09-14 DIAGNOSIS — E03.9 ACQUIRED HYPOTHYROIDISM: Primary | ICD-10-CM

## 2020-09-15 RX ORDER — LEVOTHYROXINE SODIUM 125 UG/1
125 TABLET ORAL
Qty: 90 TAB | Refills: 0 | Status: SHIPPED | OUTPATIENT
Start: 2020-09-15 | End: 2020-12-11

## 2020-09-15 NOTE — TELEPHONE ENCOUNTER
Chief Complaint   Patient presents with    Medication Refill     Levothyroxine     Last visit was 8/6/2020

## 2020-11-30 ENCOUNTER — PATIENT MESSAGE (OUTPATIENT)
Dept: FAMILY MEDICINE CLINIC | Age: 40
End: 2020-11-30

## 2020-11-30 DIAGNOSIS — Z78.9 STATIN INTOLERANCE: ICD-10-CM

## 2020-11-30 DIAGNOSIS — T46.6X5A RHABDOMYOLYSIS DUE TO STATIN THERAPY: ICD-10-CM

## 2020-11-30 DIAGNOSIS — M62.82 RHABDOMYOLYSIS DUE TO STATIN THERAPY: ICD-10-CM

## 2020-11-30 DIAGNOSIS — E78.01 FAMILIAL HYPERCHOLESTEROLEMIA: ICD-10-CM

## 2020-12-02 RX ORDER — EZETIMIBE 10 MG/1
10 TABLET ORAL DAILY
Qty: 90 TAB | Refills: 1 | Status: SHIPPED | OUTPATIENT
Start: 2020-12-02 | End: 2021-03-08 | Stop reason: SDUPTHER

## 2020-12-02 NOTE — TELEPHONE ENCOUNTER
Chief Complaint   Patient presents with    Medication Refill     Ezetimibe     Last visit was 08/06/2020

## 2020-12-10 DIAGNOSIS — E03.9 ACQUIRED HYPOTHYROIDISM: ICD-10-CM

## 2020-12-11 RX ORDER — LEVOTHYROXINE SODIUM 125 UG/1
TABLET ORAL
Qty: 90 TAB | Refills: 0 | Status: SHIPPED | OUTPATIENT
Start: 2020-12-11 | End: 2021-03-08 | Stop reason: SDUPTHER

## 2020-12-14 ENCOUNTER — TELEPHONE (OUTPATIENT)
Dept: OBGYN CLINIC | Age: 40
End: 2020-12-14

## 2020-12-14 RX ORDER — NORETHINDRONE AND ETHINYL ESTRADIOL 0.8-25(24)
KIT ORAL
Qty: 1 DOSE PACK | Refills: 0 | Status: SHIPPED | OUTPATIENT
Start: 2020-12-14 | End: 2021-01-11 | Stop reason: SDUPTHER

## 2020-12-14 NOTE — TELEPHONE ENCOUNTER
Call received at 525am    36year old patient last seen in the office on 10/7/2019 and has next appointment on 1/11/2021    Patient calling for a refill on her ocp to get her to her scheduled appointment. Prescription sent as per MD order to get patient to her preferred pharmacy to get her to her scheduled appointment. Patient advised of need to keep appointment in order to get further refills. Patient verbalized understanding.

## 2021-01-05 ENCOUNTER — VIRTUAL VISIT (OUTPATIENT)
Dept: FAMILY MEDICINE CLINIC | Age: 41
End: 2021-01-05
Payer: COMMERCIAL

## 2021-01-05 DIAGNOSIS — K52.9 GASTROENTERITIS: Primary | ICD-10-CM

## 2021-01-05 DIAGNOSIS — R11.2 INTRACTABLE VOMITING WITH NAUSEA, UNSPECIFIED VOMITING TYPE: ICD-10-CM

## 2021-01-05 DIAGNOSIS — A08.4 STOMACH FLU: ICD-10-CM

## 2021-01-05 PROCEDURE — 99213 OFFICE O/P EST LOW 20 MIN: CPT | Performed by: FAMILY MEDICINE

## 2021-01-05 RX ORDER — ONDANSETRON 8 MG/1
8 TABLET, ORALLY DISINTEGRATING ORAL
Qty: 20 TAB | Refills: 0 | Status: SHIPPED | OUTPATIENT
Start: 2021-01-05 | End: 2021-02-10 | Stop reason: ALTCHOICE

## 2021-01-05 NOTE — PATIENT INSTRUCTIONS
Gastroenteritis: Care Instructions Your Care Instructions Gastroenteritis is an illness that may cause nausea, vomiting, and diarrhea. It is sometimes called \"stomach flu. \" It can be caused by bacteria or a virus. You will probably begin to feel better in 1 to 2 days. In the meantime, get plenty of rest and make sure you do not become dehydrated. Dehydration occurs when your body loses too much fluid. Follow-up care is a key part of your treatment and safety. Be sure to make and go to all appointments, and call your doctor if you are having problems. It's also a good idea to know your test results and keep a list of the medicines you take. How can you care for yourself at home? · If your doctor prescribed antibiotics, take them as directed. Do not stop taking them just because you feel better. You need to take the full course of antibiotics. · Drink plenty of fluids to prevent dehydration, enough so that your urine is light yellow or clear like water. Choose water and other caffeine-free clear liquids until you feel better. If you have kidney, heart, or liver disease and have to limit fluids, talk with your doctor before you increase your fluid intake. · Drink fluids slowly, in frequent, small amounts, because drinking too much too fast can cause vomiting. · Begin eating mild foods, such as dry toast, yogurt, applesauce, bananas, and rice. Avoid spicy, hot, or high-fat foods, and do not drink alcohol or caffeine for a day or two. Do not drink milk or eat ice cream until you are feeling better. How to prevent gastroenteritis · Keep hot foods hot and cold foods cold. · Do not eat meats, dressings, salads, or other foods that have been kept at room temperature for more than 2 hours. · Use a thermometer to check your refrigerator. It should be between 34°F and 40°F. 
· Defrost meats in the refrigerator or microwave, not on the kitchen counter. · Keep your hands and your kitchen clean. Wash your hands, cutting boards, and countertops with hot soapy water frequently. · Cook meat until it is well done. · Do not eat raw eggs or uncooked sauces made with raw eggs. · Do not take chances. If food looks or tastes spoiled, throw it out. When should you call for help? Call 911 anytime you think you may need emergency care. For example, call if: 
  · You vomit blood or what looks like coffee grounds.  
  · You passed out (lost consciousness).  
  · You pass maroon or very bloody stools. Call your doctor now or seek immediate medical care if: 
  · You have severe belly pain.  
  · You have signs of needing more fluids. You have sunken eyes, a dry mouth, and pass only a little dark urine.  
  · You feel like you are going to faint.  
  · You have increased belly pain that does not go away in 1 to 2 days.  
  · You have new or increased nausea, or you are vomiting.  
  · You have a new or higher fever.  
  · Your stools are black and tarlike or have streaks of blood. Watch closely for changes in your health, and be sure to contact your doctor if: 
  · You are dizzy or lightheaded.  
  · You urinate less than usual, or your urine is dark yellow or brown.  
  · You do not feel better with each day that goes by. Where can you learn more? Go to http://www.kirkpatrick.com/ Enter N142 in the search box to learn more about \"Gastroenteritis: Care Instructions. \" Current as of: February 11, 2020               Content Version: 12.6 © 7115-3699 [employer]. Care instructions adapted under license by [employer] (which disclaims liability or warranty for this information). If you have questions about a medical condition or this instruction, always ask your healthcare professional. [address] any warranty or liability for your use of this information.
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
## 2021-01-08 LAB — BACTERIA UR CULT: NORMAL

## 2021-01-08 NOTE — PROGRESS NOTES
Frantz Mart,  Urine culture is negative for any bacterial growth  So vomiting likely related to stomach infection only  Let me know if you have any questions.   thanks

## 2021-01-11 ENCOUNTER — OFFICE VISIT (OUTPATIENT)
Dept: OBGYN CLINIC | Age: 41
End: 2021-01-11
Payer: COMMERCIAL

## 2021-01-11 VITALS
DIASTOLIC BLOOD PRESSURE: 68 MMHG | HEIGHT: 63 IN | BODY MASS INDEX: 22.86 KG/M2 | WEIGHT: 129 LBS | SYSTOLIC BLOOD PRESSURE: 110 MMHG

## 2021-01-11 DIAGNOSIS — N83.202 CYSTS OF BOTH OVARIES: ICD-10-CM

## 2021-01-11 DIAGNOSIS — Z01.411 ENCOUNTER FOR GYNECOLOGICAL EXAMINATION WITH ABNORMAL FINDING: ICD-10-CM

## 2021-01-11 DIAGNOSIS — N83.201 CYSTS OF BOTH OVARIES: ICD-10-CM

## 2021-01-11 DIAGNOSIS — Z12.31 ENCOUNTER FOR SCREENING MAMMOGRAM FOR MALIGNANT NEOPLASM OF BREAST: Primary | ICD-10-CM

## 2021-01-11 PROCEDURE — 99396 PREV VISIT EST AGE 40-64: CPT | Performed by: OBSTETRICS & GYNECOLOGY

## 2021-01-11 RX ORDER — NORETHINDRONE AND ETHINYL ESTRADIOL 0.8-25(24)
1 KIT ORAL DAILY
Qty: 3 DOSE PACK | Refills: 4 | Status: SHIPPED | OUTPATIENT
Start: 2021-01-11 | End: 2021-02-08

## 2021-01-11 NOTE — PROGRESS NOTES
Results discussed with patient by Dr. Ding via [employer].  Letter sent with results and instructions as follow up.  Cinthia Lo LPN

## 2021-01-11 NOTE — PROGRESS NOTES
Annual exam    Seun Morel is a 36 y.o. G0 presenting for annual exam.     Overall doing well on Kaitlib chewable OCPs. Regular monthly menses, light flow, occasionally has months where she does not have a menstrual cycle, though this is less common since she has had her thyroid disease treated. Stable on sertraline for anxiety. Declines STD testing. Increased acne recently.  at [employer]. Went to [employer] for college, grew up in [place]. 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 Ob/Gyn Hx:  G0   Menarche- age 15  LMP-13/25/22  Menses- regular with OCPs, h/o irregular menses prior to tx of thyroid dz  Contraception- Kaitlib OCPs  STI- denies, declines testing today  ? SA- yes    Health maintenance:  Pap- 8/30/18 NILM HPV Neg, denies h/o abnl, next due 2023  Mammo- 4-5 years ago, due to pain and lump, was diagnosed with fibroadenoma, relief with primrose oil and vitamin E, due for mammo today  Gardasil- denies    Past Medical History:   Diagnosis Date    Anxiety and depression     Hiatal hernia     Hypercholesterolemia     Hypothyroid     Rhabdomyolysis due to statin therapy 12/2017    After atorvastatin 40 mg     Rhabdomyolysis due to statin therapy 1/25/2018    Vitamin D deficiency        History reviewed. No pertinent surgical history.     Family History   Problem Relation Age of Onset    Elevated Lipids Father     Stroke Maternal Grandfather     Heart Attack Paternal Grandmother     Heart Attack Paternal Grandfather     Heart Attack Paternal Aunt     Cancer Mother 61        renal cell carcinoma    No Known Problems Brother     Diabetes Paternal Uncle     Hypertension Maternal Grandmother        Social History     Socioeconomic History    Marital status: SINGLE     Spouse name: Not on file    Number of children: Not on file    Years of education: Not on file    Highest education level: Not on file   Occupational History    Not on file   Social Needs    Financial resource strain: Not on file    Food insecurity     Worry: Not on file     Inability: Not on file    Transportation needs     Medical: Not on file     Non-medical: Not on file   Tobacco Use    Smoking status: Never Smoker    Smokeless tobacco: Never Used   Substance and Sexual Activity    Alcohol use: Yes     Alcohol/week: 2.0 standard drinks     Types: 1 Glasses of wine, 1 Shots of liquor per week     Comment: rare    Drug use: No    Sexual activity: Yes     Partners: Male     Birth control/protection: Pill   Lifestyle    Physical activity     Days per week: Not on file     Minutes per session: Not on file    Stress: Not on file   Relationships    Social connections     Talks on phone: Not on file     Gets together: Not on file     Attends Baptism service: Not on file     Active member of club or organization: Not on file     Attends meetings of clubs or organizations: Not on file     Relationship status: Not on file    Intimate partner violence     Fear of current or ex partner: Not on file     Emotionally abused: Not on file     Physically abused: Not on file     Forced sexual activity: Not on file   Other Topics Concern    Not on file   Social History Narrative    Pt is  at [employer]. She is from [place]. 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 Current Outpatient Medications   Medication Sig Dispense Refill    vitamin E acetate (VITAMIN E PO) Take  by mouth.  yarely primrose/linoleic/g-lenic (PRIMROSE OIL PO) Take  by mouth.  Kaitlib Fe 0.8mg-25mcg(24) and 75 mg (4) chew TAKE 1 TABLET BY MOUTH EVERY DAY 1 Dose Pack 0    levothyroxine (SYNTHROID) 125 mcg tablet TAKE 1 TABLET BY MOUTH EVERY DAY BEFORE BREAKFAST 90 Tab 0    ezetimibe (ZETIA) 10 mg tablet Take 1 Tab by mouth daily.  90 Tab 1    sertraline (ZOLOFT) 50 mg tablet TAKE 1 TABLET BY MOUTH EVERY DAY 90 Tab 0    multivitamin (ONE A DAY) tablet Take 1 Tab by mouth daily.  cholecalciferol (VITAMIN D3) 1,000 unit tablet Take  by mouth daily.  ondansetron (ZOFRAN ODT) 8 mg disintegrating tablet Take 1 Tab by mouth every eight (8) hours as needed for Nausea or Vomiting.  20 Tab 0       Allergies   Allergen Reactions    Atorvastatin Other (comments)    Sulfa (Sulfonamide Antibiotics) Rash       Review of Systems - History obtained from the patient  Constitutional: negative for weight loss, fever, night sweats  HEENT: negative for hearing loss, earache, congestion, snoring, sorethroat  CV: negative for chest pain, palpitations, edema  Resp: negative for cough, shortness of breath, wheezing  GI: negative for change in bowel habits, abdominal pain, black or bloody stools  : negative for frequency, dysuria, hematuria, vaginal discharge  MSK: negative for back pain, joint pain, muscle pain  Breast: negative for breast lumps, nipple discharge, galactorrhea, +right breast tenderness  Skin :negative for itching, rash, hives  Neuro: negative for dizziness, headache, confusion, weakness  Psych: negative for anxiety, depression, change in mood  Heme/lymph: negative for bleeding, bruising, pallor    Physical Exam    Visit Vitals  /68   Ht 5' 3\" (1.6 m)   Wt 129 lb (58.5 kg)   LMP 12/19/2020   BMI 22.85 kg/m²       Constitutional  · Appearance: well-nourished, well developed, alert, in no acute distress    HENT  · Head and Face: appears normal    Neck  · Inspection/Palpation: normal appearance, no masses or tenderness  · Lymph Nodes: no lymphadenopathy present  · Thyroid: gland size normal, nontender, no nodules or masses present on palpation    Chest  · Respiratory Effort: non-labored breathing  · Auscultation: CTAB, normal breath sounds    Cardiovascular  · Heart:  · Auscultation: regular rate and rhythm without murmur  · Extremities: no peripheral edema    Breasts  · Inspection of Breasts: breasts symmetrical, no skin changes, no discharge present, nipple appearance normal, no skin retraction present  · Palpation of Breasts and Axillae: no masses present on palpation, mildly tender upper outer quadrant of right breast/chest wall, no appreciable mass/lesion/abnormality at this location, unchanged from exam at visit last year  · Axillary Lymph Nodes: no lymphadenopathy present    Gastrointestinal  · Abdominal Examination: abdomen non-tender to palpation, normal bowel sounds, no masses present  · Liver and spleen: no hepatomegaly present, spleen not palpable  · Hernias: no hernias identified    Genitourinary  · External Genitalia: normal appearance for age, no discharge present, no tenderness present, no inflammatory lesions present, no masses present, no atrophy present  · Vagina: normal vaginal vault without central or paravaginal defects, no discharge present, no inflammatory lesions present, no masses present  · Bladder: non-tender to palpation  · Urethra: appears normal  · Cervix: normal   · Uterus: normal size, shape and consistency  · Adnexa: no adnexal tenderness present, Prominent ovaries bilaterally, L>R  · Perineum: perineum within normal limits, no evidence of trauma, no rashes or skin lesions present    Skin  · General Inspection: no rash, no lesions identified    Neurologic/Psychiatric  · Mental Status:  · Orientation: grossly oriented to person, place and time  · Mood and Affect: mood normal, affect appropriate      Assessment/Plan:  36 y.o. G0 presenting for annual exam. Overall doing well. Prominent ovaries on exam, ? cyst vs. Mass.      Health Maintenance:  -diet, exercise, healthy lifestyle  -pap/HPV utd, repeat cotesting 2023  -declines STI testing  -mammo referral today  -refill Kaitlib OCPs  -TVUS referral    RTC: 2 wks for US, 1 year for AE or sooner julia Mondragon MD  1/11/2021  2:19 PM

## 2021-02-03 ENCOUNTER — OFFICE VISIT (OUTPATIENT)
Dept: OBGYN CLINIC | Age: 41
End: 2021-02-03
Payer: COMMERCIAL

## 2021-02-03 ENCOUNTER — HOSPITAL ENCOUNTER (OUTPATIENT)
Dept: MAMMOGRAPHY | Age: 41
Discharge: HOME | End: 2021-02-03
Attending: OBSTETRICS & GYNECOLOGY
Payer: COMMERCIAL

## 2021-02-03 VITALS
DIASTOLIC BLOOD PRESSURE: 64 MMHG | SYSTOLIC BLOOD PRESSURE: 102 MMHG | WEIGHT: 130 LBS | BODY MASS INDEX: 23.04 KG/M2 | HEIGHT: 63 IN

## 2021-02-03 DIAGNOSIS — N94.89 ADNEXAL MASS: Primary | ICD-10-CM

## 2021-02-03 DIAGNOSIS — Z12.31 ENCOUNTER FOR SCREENING MAMMOGRAM FOR MALIGNANT NEOPLASM OF BREAST: ICD-10-CM

## 2021-02-03 PROCEDURE — 99212 OFFICE O/P EST SF 10 MIN: CPT | Performed by: OBSTETRICS & GYNECOLOGY

## 2021-02-03 PROCEDURE — 77063 BREAST TOMOSYNTHESIS BI: CPT

## 2021-02-03 PROCEDURE — 76830 TRANSVAGINAL US NON-OB: CPT | Performed by: OBSTETRICS & GYNECOLOGY

## 2021-02-03 NOTE — PATIENT INSTRUCTIONS
Normal Menstrual Cycle: Care Instructions Your Care Instructions The menstrual cycle is the series of changes a woman's body goes through to prepare for a possible pregnancy. About once a month, the uterus grows a new, thick lining. This lining is then ready to receive a fertilized egg. The egg becomes fertilized if it joins with a man's sperm and implants in the lining of the uterus. This is how pregnancy begins. When there is no fertilized egg, the uterus sheds its lining. This is the monthly menstrual bleeding, or period. Women have periods from their early teen years until menopause, around age 48. A normal cycle lasts from 21 to 35 days. Count from the first day of one menstrual period until the first day of your next period to find the number of days in your cycle. Some women have no discomfort during their menstrual cycles. But others have mild to severe symptoms. If you have problems, ask your doctor about over-the-counter medicine. It may help relieve pain and bleeding. Follow-up care is a key part of your treatment and safety. Be sure to make and go to all appointments, and call your doctor if you are having problems. It's also a good idea to know your test results and keep a list of the medicines you take. How can you care for yourself at home? · Write down the day you start your menstrual period each month. Also note how long your period lasts. If your cycle is regular, it can help you predict when you will have your next period. · To help with symptoms, get regular exercise and eat healthy foods. Also try to limit caffeine and reduce stress. To relieve menstrual cramps · Put a warm water bottle or a warm cloth on your belly. Or use a heating pad set on low. Heat improves blood flow and may relieve pain. · To relieve back pressure, lie down and put a pillow under your knees.  Or lie on your side and bring your knees up to your chest. 
 · Get regular exercise. It improves blood flow, which may decrease pain. · Use pads instead of tampons if you have pain in your vagina. · Take anti-inflammatory medicines to reduce pain. These include ibuprofen (Advil, Motrin) and naproxen (Aleve). Be safe with medicines. Read and follow all instructions on the label. Start taking the recommended dose when symptoms begin or one day before your menstrual period starts. If you are trying to become pregnant, talk to your doctor before you use any medicine. To manage menstrual bleeding · Tampons range from small to large, for light to heavy flow. You can place a tampon in your vagina by using the slender tube packaged with the tampon. Or you can tuck it in with a finger. Change the tampon at least every 4 to 8 hours. This helps prevent leakage and infection. · Pads range from thin and light to thick and super absorbent. They protect your clothing, with or without using a tampon. · Menstrual cups are inserted in the vagina to collect menstrual flow. You remove the menstrual cup to empty it. Some are disposable and some can be washed and used again. · Whatever you use, be sure to change it regularly. Tampons are ideal for activities that pads are not practical for, such as swimming. When should you call for help? Watch closely for changes in your health, and be sure to contact your doctor if you have any problems. Where can you learn more? Go to http://www.gray.com/ Enter M944 in the search box to learn more about \"Normal Menstrual Cycle: Care Instructions. \" Current as of: November 8, 2019               Content Version: 12.6 © 4427-8995 [employer]. Care instructions adapted under license by "[employer]" (which disclaims liability or warranty for this information). If you have questions about a medical condition or this instruction, always ask your healthcare professional. [address] any warranty or liability for your use of this information.

## 2021-02-03 NOTE — PROGRESS NOTES
Follow Up    Silvestre Del Rio is a 36 y.o. G0 presenting for ultrasound and follow up of ?adnexal mass on recent annual exam. Ultrasound today showing normal pelvic structures. No evidence of adnexal pathology. Pt asymptomatic. Overall doing well on Kaitlib chewable OCPs. Regular monthly menses, light flow, occasionally has months where she does not have a menstrual cycle, though this is less common since she has had her thyroid disease treated. Stable on sertraline for anxiety. Increased acne recently.  at [employer]. Went to [employer] for [employer], grew up in [place]. 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 TRANSVAGINAL ULTRASOUND PERFORMED 2/3/21:  THE UTERUS IS ANTEVERTED, NORMAL IN SIZE, AND ECHOGENICITY. ENDOMETRIUM MEASURES 1.6 MM IN THICKNESS. NO EVIDENCE OF MASSES OR ABNORMALITIES ARE  SEEN. THE RIGHT OVARY APPEARS WNL. THE LEFT OVARY APPEARS WNL. NO FREE FLUID SEEN IN THE CDS. Ob/Gyn Hx:  G0   Menarche- age 15  LMP-13/25/22  Menses- regular with OCPs, h/o irregular menses prior to tx of thyroid dz  Contraception- Kaitlib OCPs  STI- denies  ? SA- yes    Health maintenance:  Pap- 8/30/18 NILM HPV Neg, denies h/o abnl, next due 2023  Mammo- 4-5 years ago, due to pain and lump, was diagnosed with fibroadenoma, relief with primrose oil and vitamin E, scheduled today  Gardasil- denies    Past Medical History:   Diagnosis Date    Anxiety and depression     Hiatal hernia     Hypercholesterolemia     Hypothyroid     Rhabdomyolysis due to statin therapy 12/2017    After atorvastatin 40 mg     Rhabdomyolysis due to statin therapy 1/25/2018    Vitamin D deficiency        No past surgical history on file.     Family History   Problem Relation Age of Onset    Elevated Lipids Father     Stroke Maternal Grandfather     Heart Attack Paternal Grandmother     Heart Attack Paternal Grandfather     Heart Attack Paternal Aunt     Cancer Mother 61        renal cell carcinoma    No Known Problems Brother     Diabetes Paternal Uncle     Hypertension Maternal Grandmother        Social History     Socioeconomic History    Marital status: SINGLE     Spouse name: Not on file    Number of children: Not on file    Years of education: Not on file    Highest education level: Not on file   Occupational History    Not on file   Social Needs    Financial resource strain: Not on file    Food insecurity     Worry: Not on file     Inability: Not on file    Transportation needs     Medical: Not on file     Non-medical: Not on file   Tobacco Use    Smoking status: Never Smoker    Smokeless tobacco: Never Used   Substance and Sexual Activity    Alcohol use: Yes     Alcohol/week: 2.0 standard drinks     Types: 1 Glasses of wine, 1 Shots of liquor per week     Comment: rare    Drug use: No    Sexual activity: Yes     Partners: Male     Birth control/protection: Pill   Lifestyle    Physical activity     Days per week: Not on file     Minutes per session: Not on file    Stress: Not on file   Relationships    Social connections     Talks on phone: Not on file     Gets together: Not on file     Attends Tenriism service: Not on file     Active member of club or organization: Not on file     Attends meetings of clubs or organizations: Not on file     Relationship status: Not on file    Intimate partner violence     Fear of current or ex partner: Not on file     Emotionally abused: Not on file     Physically abused: Not on file     Forced sexual activity: Not on file   Other Topics Concern    Not on file   Social History Narrative    Pt is  at [employer]. She is from [place], [place]. Went to [employer] for college (arts degree). 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 Current Outpatient Medications   Medication Sig Dispense Refill    vitamin E acetate (VITAMIN E PO) Take  by mouth.  yarely primrose/linoleic/g-lenic (PRIMROSE OIL PO) Take  by mouth.  Kaitlib Fe 0.8mg-25mcg(24) and 75 mg (4) chew Take 1 Tab by mouth daily for 28 days. TAKE 1 TABLET BY MOUTH EVERY DAY 3 Dose Pack 4    ondansetron (ZOFRAN ODT) 8 mg disintegrating tablet Take 1 Tab by mouth every eight (8) hours as needed for Nausea or Vomiting. 20 Tab 0    levothyroxine (SYNTHROID) 125 mcg tablet TAKE 1 TABLET BY MOUTH EVERY DAY BEFORE BREAKFAST 90 Tab 0    ezetimibe (ZETIA) 10 mg tablet Take 1 Tab by mouth daily. 90 Tab 1    sertraline (ZOLOFT) 50 mg tablet TAKE 1 TABLET BY MOUTH EVERY DAY 90 Tab 0    multivitamin (ONE A DAY) tablet Take 1 Tab by mouth daily.  cholecalciferol (VITAMIN D3) 1,000 unit tablet Take  by mouth daily.          Allergies   Allergen Reactions    Atorvastatin Other (comments)    Sulfa (Sulfonamide Antibiotics) Rash       Review of Systems - History obtained from the patient  Constitutional: negative for weight loss, fever, night sweats  HEENT: negative for hearing loss, earache, congestion, snoring, sorethroat  CV: negative for chest pain, palpitations, edema  Resp: negative for cough, shortness of breath, wheezing  GI: negative for change in bowel habits, abdominal pain, black or bloody stools  : negative for frequency, dysuria, hematuria, vaginal discharge  MSK: negative for back pain, joint pain, muscle pain  Breast: negative for breast lumps, nipple discharge, galactorrhea, +right breast tenderness  Skin :negative for itching, rash, hives  Neuro: negative for dizziness, headache, confusion, weakness  Psych: negative for anxiety, depression, change in mood  Heme/lymph: negative for bleeding, bruising, pallor    Physical Exam  Visit Vitals  /64   Ht 5' 3\" (1.6 m)   Wt 130 lb (59 kg)   LMP 01/16/2021   BMI 23.03 kg/m²     Constitutional  · Appearance: well-nourished, well developed, alert, in no acute distress    HENT  · Head and Face: appears normal    Chest  · Respiratory Effort: non-labored breathing    Cardiovascular  · Extremities: no peripheral edema    Skin  · General Inspection: no rash, no lesions identified    Neurologic/Psychiatric  · Mental Status:  · Orientation: grossly oriented to person, place and time  · Mood and Affect: mood normal, affect appropriate      Assessment/Plan:  40 y.o. G0 presenting for ultrasound and prominent ovaries on exam, ?cyst vs. Mass.     -TVUS findings reviewed with pt today, normal pelvic structures, reassurance provided    RTC: for AE or sooner prn    Carmen Thornton MD  2/3/2021  11:33 AM

## 2021-02-10 ENCOUNTER — OFFICE VISIT (OUTPATIENT)
Dept: FAMILY MEDICINE CLINIC | Age: 41
End: 2021-02-10
Payer: COMMERCIAL

## 2021-02-10 VITALS
BODY MASS INDEX: 23.5 KG/M2 | SYSTOLIC BLOOD PRESSURE: 108 MMHG | WEIGHT: 132.6 LBS | DIASTOLIC BLOOD PRESSURE: 75 MMHG | HEIGHT: 63 IN | RESPIRATION RATE: 18 BRPM | HEART RATE: 74 BPM | TEMPERATURE: 97.5 F | OXYGEN SATURATION: 98 %

## 2021-02-10 DIAGNOSIS — Z00.00 ROUTINE GENERAL MEDICAL EXAMINATION AT A HEALTH CARE FACILITY: Primary | ICD-10-CM

## 2021-02-10 DIAGNOSIS — F32.5 DEPRESSION, MAJOR, IN REMISSION (HCC): ICD-10-CM

## 2021-02-10 DIAGNOSIS — E55.9 VITAMIN D INSUFFICIENCY: ICD-10-CM

## 2021-02-10 DIAGNOSIS — R11.2 INTRACTABLE VOMITING WITH NAUSEA, UNSPECIFIED VOMITING TYPE: ICD-10-CM

## 2021-02-10 DIAGNOSIS — H60.391 OTHER INFECTIVE ACUTE OTITIS EXTERNA OF RIGHT EAR: ICD-10-CM

## 2021-02-10 DIAGNOSIS — F32.89 OTHER DEPRESSION: ICD-10-CM

## 2021-02-10 PROCEDURE — 99396 PREV VISIT EST AGE 40-64: CPT | Performed by: FAMILY MEDICINE

## 2021-02-10 RX ORDER — SERTRALINE HYDROCHLORIDE 50 MG/1
TABLET, FILM COATED ORAL
Qty: 90 TAB | Refills: 3 | Status: SHIPPED | OUTPATIENT
Start: 2021-02-10 | End: 2021-03-08 | Stop reason: SDUPTHER

## 2021-02-10 RX ORDER — SERTRALINE HYDROCHLORIDE 50 MG/1
50 TABLET, FILM COATED ORAL DAILY
Qty: 90 TAB | Refills: 1 | Status: CANCELLED | OUTPATIENT
Start: 2021-02-10

## 2021-02-10 RX ORDER — NEOMYCIN SULFATE, POLYMYXIN B SULFATE AND HYDROCORTISONE 10; 3.5; 1 MG/ML; MG/ML; [USP'U]/ML
3 SUSPENSION/ DROPS AURICULAR (OTIC) 3 TIMES DAILY
Qty: 10 ML | Refills: 0 | Status: SHIPPED | OUTPATIENT
Start: 2021-02-10 | End: 2021-02-20

## 2021-02-10 NOTE — PATIENT INSTRUCTIONS
Well Visit, Ages 25 to 48: Care Instructions Your Care Instructions Physical exams can help you stay healthy. Your doctor has checked your overall health and may have suggested ways to take good care of yourself. He or she also may have recommended tests. At home, you can help prevent illness with healthy eating, regular exercise, and other steps. Follow-up care is a key part of your treatment and safety. Be sure to make and go to all appointments, and call your doctor if you are having problems. It's also a good idea to know your test results and keep a list of the medicines you take. How can you care for yourself at home? · Reach and stay at a healthy weight. This will lower your risk for many problems, such as obesity, diabetes, heart disease, and high blood pressure. · Get at least 30 minutes of physical activity on most days of the week. Walking is a good choice. You also may want to do other activities, such as running, swimming, cycling, or playing tennis or team sports. Discuss any changes in your exercise program with your doctor. · Do not smoke or allow others to smoke around you. If you need help quitting, talk to your doctor about stop-smoking programs and medicines. These can increase your chances of quitting for good. · Talk to your doctor about whether you have any risk factors for sexually transmitted infections (STIs). Having one sex partner (who does not have STIs and does not have sex with anyone else) is a good way to avoid these infections. · Use birth control if you do not want to have children at this time. Talk with your doctor about the choices available and what might be best for you. · Protect your skin from too much sun. When you're outdoors from 10 a.m. to 4 p.m., stay in the shade or cover up with clothing and a hat with a wide brim. Wear sunglasses that block UV rays. Even when it's cloudy, put broad-spectrum sunscreen (SPF 30 or higher) on any exposed skin. · See a dentist one or two times a year for checkups and to have your teeth cleaned. · Wear a seat belt in the car. Follow your doctor's advice about when to have certain tests. These tests can spot problems early. For everyone · Cholesterol. Have the fat (cholesterol) in your blood tested after age 21. Your doctor will tell you how often to have this done based on your age, family history, or other things that can increase your risk for heart disease. · Blood pressure. Have your blood pressure checked during a routine doctor visit. Your doctor will tell you how often to check your blood pressure based on your age, your blood pressure results, and other factors. · Vision. Talk with your doctor about how often to have a glaucoma test. 
· Diabetes. Ask your doctor whether you should have tests for diabetes. · Colon cancer. Your risk for colorectal cancer gets higher as you get older. Some experts say that adults should start regular screening at age 48 and stop at age 76. Others say to start before age 48 or continue after age 76. Talk with your doctor about your risk and when to start and stop screening. For women · Breast exam and mammogram. Talk to your doctor about when you should have a clinical breast exam and a mammogram. Medical experts differ on whether and how often women under 50 should have these tests. Your doctor can help you decide what is right for you. · Cervical cancer screening test and pelvic exam. Begin with a Pap test at age 24. The test often is part of a pelvic exam. Starting at age 27, you may choose to have a Pap test, an HPV test, or both tests at the same time (called co-testing). Talk with your doctor about how often to have testing. · Tests for sexually transmitted infections (STIs). Ask whether you should have tests for STIs. You may be at risk if you have sex with more than one person, especially if your partners do not wear condoms. For men · Tests for sexually transmitted infections (STIs). Ask whether you should have tests for STIs. You may be at risk if you have sex with more than one person, especially if you do not wear a condom. · Testicular cancer exam. Ask your doctor whether you should check your testicles regularly. · Prostate exam. Talk to your doctor about whether you should have a blood test (called a PSA test) for prostate cancer. Experts differ on whether and when men should have this test. Some experts suggest it if you are older than 39 and are -American or have a father or brother who got prostate cancer when he was younger than 72. When should you call for help? Watch closely for changes in your health, and be sure to contact your doctor if you have any problems or symptoms that concern you. Where can you learn more? Go to http://www.kirkpatrick.com/ Enter P072 in the search box to learn more about \"Well Visit, Ages 25 to 48: Care Instructions. \" Current as of: May 27, 2020               Content Version: 12.6 © 2006-2020 [employer]. Care instructions adapted under license by [facility] (which disclaims liability or warranty for this information). If you have questions about a medical condition or this instruction, always ask your healthcare professional. [address] any warranty or liability for your use of this information.

## 2021-02-10 NOTE — TELEPHONE ENCOUNTER
Last Visit: VV 1/5/21 MD Yimi Gill  Next Appointment: Today- 2/10/21 MD Yimi Gill  Previous Refill Encounter(s): 8/13/20 90  Filled 11/10/20 per pharmacy    Requested Prescriptions     Pending Prescriptions Disp Refills    sertraline (ZOLOFT) 50 mg tablet 90 Tab 1     Sig: Take 1 Tab by mouth daily.

## 2021-02-10 NOTE — PROGRESS NOTES
Chief Complaint   Patient presents with    Complete Physical     Follow up         1. Have you been to the ER, urgent care clinic since your last visit? Hospitalized since your last visit? No    2. Have you seen or consulted any other health care providers outside of the [address] [place] [address] since your last visit? Include any pap smears or colon screening. No    3 most recent PHQ Screens 2/10/2021   Little interest or pleasure in doing things Not at all   Feeling down, depressed, irritable, or hopeless Not at all   Total Score PHQ 2 0   Trouble falling or staying asleep, or sleeping too much -   Feeling tired or having little energy -   Poor appetite, weight loss, or overeating -   Feeling bad about yourself - or that you are a failure or have let yourself or your family down -   Trouble concentrating on things such as school, work, reading, or watching TV -   Moving or speaking so slowly that other people could have noticed; or the opposite being so fidgety that others notice -   Thoughts of being better off dead, or hurting yourself in some way -   PHQ 9 Score -   How difficult have these problems made it for you to do your work, take care of your home and get along with others -     Abuse Screening Questionnaire 2/10/2021   Do you ever feel afraid of your partner? N   Are you in a relationship with someone who physically or mentally threatens you? N   Is it safe for you to go home? Y         Health Maintenance Due   Topic Date Due    Flu Vaccine (1) 09/01/2020     Patient declined flu vaccine.

## 2021-02-10 NOTE — PROGRESS NOTES
HISTORY OF PRESENT ILLNESS  Morena Byrnes is a 36 y.o. female. seen for complete physical  Had been to Gyn since her last visit with me . Had pelvic US,that was very normal    HPI  Health Maintenance  Immunizations:    Influenza: up to date. Tetanus: up to date. Gardasil: up to date. Cancer screening:   Cervical: reviewed guidelines, UTD. Breast: reviewed guidelines, reviewed SBE with her, UTD    Endocrine Review  Patient is seen for followup of hypothyroidism. Started after she was very sick with strep throat and had Flu shot. Had extreme myopathy, elevated Lipid profile, all her rheumatological work up was negative except highly abnormal thyroid profile. Since last visit: no changes.  She reports medication compliance: all the time and is taking separate from all other meds.  She reports the following concerns/problems/med side effects: none.  Lab review: labs reviewed and discussed with patient.    On Levothyroxine 125 mcg    Cardiovascular Review  The patient has hyperlipidemia.  She reports no TIA's, no chest pain on exertion, no dyspnea on exertion, no swelling of ankles, no orthostatic dizziness or lightheadedness, no orthopnea or paroxysmal nocturnal dyspnea, no palpitations, no muscle aches or pain.  Diet and Lifestyle: generally follows a low fat low cholesterol diet, generally follows a low sodium diet, exercises regularly, nonsmoker.  Lab review: labs reviewed and discussed with patient.    Medicines:not on statin due to elevated CPK in past, is doing well on Ezetimibe ,     Patient Care Team:  Britt Grover MD as PCP - General (Family Medicine)  Britt Grover MD as PCP - [address] [place] [address] Provider  Charmaine Park MD (Cardiology)  Jeannie Moscoso MD (Internal Medicine)  Néstor Galdamez NP (Nurse Practitioner)       The following sections were reviewed & updated as appropriate: PMH, PSH, FH, and SH.       Review of Systems   Constitutional: Negative for chills, fever and malaise/fatigue. HENT: Positive for ear pain. Negative for congestion, sore throat and tinnitus. Eyes: Negative for blurred vision, double vision, pain and discharge. Respiratory: Negative for cough, shortness of breath and wheezing. Cardiovascular: Negative for chest pain, palpitations and leg swelling. Gastrointestinal: Negative for abdominal pain, blood in stool, constipation, diarrhea, nausea and vomiting. Genitourinary: Negative for dysuria, frequency, hematuria and urgency. Musculoskeletal: Negative for back pain, joint pain and myalgias. Skin: Negative for rash. Neurological: Negative for dizziness, tremors, seizures and headaches. Endo/Heme/Allergies: Negative for polydipsia. Does not bruise/bleed easily. Psychiatric/Behavioral: Negative for depression and substance abuse. The patient is not nervous/anxious. Physical Exam  Vitals signs and nursing note reviewed. Constitutional:       Appearance: She is well-developed. HENT:      Head: Normocephalic and atraumatic. Right Ear: External ear normal. Swelling and tenderness present. Left Ear: External ear normal. No swelling or tenderness. Nose: Nose normal.   Eyes:      General: No scleral icterus. Conjunctiva/sclera: Conjunctivae normal.      Pupils: Pupils are equal, round, and reactive to light. Neck:      Musculoskeletal: Normal range of motion and neck supple. Thyroid: No thyromegaly. Vascular: No JVD. Cardiovascular:      Rate and Rhythm: Normal rate and regular rhythm. Heart sounds: Normal heart sounds. No murmur. No friction rub. No gallop. Pulmonary:      Effort: Pulmonary effort is normal.      Breath sounds: Normal breath sounds. No wheezing or rales. Chest:      Chest wall: No tenderness. Breasts: Breasts are symmetrical.         Right: No inverted nipple, mass, nipple discharge, skin change or tenderness.          Left: No inverted nipple, mass, nipple discharge, skin change or tenderness. Abdominal:      General: Bowel sounds are normal. There is no distension. Palpations: Abdomen is soft. There is no mass. Tenderness: There is no abdominal tenderness. Musculoskeletal: Normal range of motion. General: No tenderness. Lymphadenopathy:      Cervical: No cervical adenopathy. Skin:     General: Skin is warm and dry. Findings: No rash. Neurological:      Mental Status: She is alert and oriented to person, place, and time. Cranial Nerves: No cranial nerve deficit. Deep Tendon Reflexes: Reflexes are normal and symmetric. Psychiatric:         Behavior: Behavior normal.         Thought Content: Thought content normal.         Judgment: Judgment normal.         ASSESSMENT and PLAN  Diagnoses and all orders for this visit:    1. Routine general medical examination at a health care facility  -     METABOLIC PANEL, COMPREHENSIVE; Future  -     LIPID PANEL; Future  -     TSH 3RD GENERATION; Future  -     URINALYSIS W/ RFLX MICROSCOPIC; Future  -     CBC WITH AUTOMATED DIFF; Future  -     T4, FREE; Future  -     HEMOGLOBIN A1C WITH EAG; Future    2. Depression, major, [facility]  -     sertraline (ZOLOFT) 50 mg tablet; TAKE 1 TABLET BY MOUTH EVERY DAY    3. Vitamin D insufficiency  -     VITAMIN D, 25 HYDROXY; Future    4. Other infective acute otitis externa of right ear  -     neomycin-polymyxin-hydrocortisone, buffered, (PEDIOTIC) 3.5-10,000-1 mg/mL-unit/mL-% otic suspension; Administer 3 Drops in right ear three (3) times daily for 10 days. 5. Intractable vomiting with nausea, unspecified vomiting type  -     CULTURE, URINE      Discussed lifestyle issues and health guidance given  Patient was given an after visit summary which includes diagnoses, vital signs, current medications, instructions and references & authorized prescriptions . Results of labs will be conveyed to patient, once available.   Pt verbalized instructions I provided and expressed understanding of discussion that was held today. Follow-up and Dispositions    · Return in about 6 months (around 8/10/2021) for follow up, fasting.

## 2021-02-12 LAB
25(OH)D3+25(OH)D2 SERPL-MCNC: 36.8 NG/ML (ref 30–100)
ALBUMIN SERPL-MCNC: 4.3 G/DL (ref 3.8–4.8)
ALBUMIN/GLOB SERPL: 1.5 {RATIO} (ref 1.2–2.2)
ALP SERPL-CCNC: 74 IU/L (ref 39–117)
ALT SERPL-CCNC: 20 IU/L (ref 0–32)
APPEARANCE UR: CLEAR
AST SERPL-CCNC: 24 IU/L (ref 0–40)
BASOPHILS # BLD AUTO: 0 X10E3/UL (ref 0–0.2)
BASOPHILS NFR BLD AUTO: 1 %
BILIRUB SERPL-MCNC: 1.3 MG/DL (ref 0–1.2)
BILIRUB UR QL STRIP: NEGATIVE
BUN SERPL-MCNC: 14 MG/DL (ref 6–24)
BUN/CREAT SERPL: 18 (ref 9–23)
CALCIUM SERPL-MCNC: 9.1 MG/DL (ref 8.7–10.2)
CHLORIDE SERPL-SCNC: 102 MMOL/L (ref 96–106)
CHOLEST SERPL-MCNC: 209 MG/DL (ref 100–199)
CO2 SERPL-SCNC: 24 MMOL/L (ref 20–29)
COLOR UR: YELLOW
CREAT SERPL-MCNC: 0.78 MG/DL (ref 0.57–1)
EOSINOPHIL # BLD AUTO: 0.1 X10E3/UL (ref 0–0.4)
EOSINOPHIL NFR BLD AUTO: 2 %
ERYTHROCYTE [DISTWIDTH] IN BLOOD BY AUTOMATED COUNT: 11.5 % (ref 11.7–15.4)
EST. AVERAGE GLUCOSE BLD GHB EST-MCNC: 103 MG/DL
GLOBULIN SER CALC-MCNC: 2.9 G/DL (ref 1.5–4.5)
GLUCOSE SERPL-MCNC: 88 MG/DL (ref 65–99)
GLUCOSE UR QL: NEGATIVE
HBA1C MFR BLD: 5.2 % (ref 4.8–5.6)
HCT VFR BLD AUTO: 45.6 % (ref 34–46.6)
HDLC SERPL-MCNC: 49 MG/DL
HGB BLD-MCNC: 15.3 G/DL (ref 11.1–15.9)
HGB UR QL STRIP: NEGATIVE
IMM GRANULOCYTES # BLD AUTO: 0 X10E3/UL (ref 0–0.1)
IMM GRANULOCYTES NFR BLD AUTO: 0 %
INTERPRETATION, 910389: NORMAL
KETONES UR QL STRIP: NEGATIVE
LDLC SERPL CALC-MCNC: 140 MG/DL (ref 0–99)
LEUKOCYTE ESTERASE UR QL STRIP: NEGATIVE
LYMPHOCYTES # BLD AUTO: 1.9 X10E3/UL (ref 0.7–3.1)
LYMPHOCYTES NFR BLD AUTO: 24 %
MCH RBC QN AUTO: 31.5 PG (ref 26.6–33)
MCHC RBC AUTO-ENTMCNC: 33.6 G/DL (ref 31.5–35.7)
MCV RBC AUTO: 94 FL (ref 79–97)
MICRO URNS: NORMAL
MONOCYTES # BLD AUTO: 0.5 X10E3/UL (ref 0.1–0.9)
MONOCYTES NFR BLD AUTO: 6 %
NEUTROPHILS # BLD AUTO: 5.5 X10E3/UL (ref 1.4–7)
NEUTROPHILS NFR BLD AUTO: 67 %
NITRITE UR QL STRIP: NEGATIVE
PH UR STRIP: 6.5 [PH] (ref 5–7.5)
PLATELET # BLD AUTO: 351 X10E3/UL (ref 150–450)
POTASSIUM SERPL-SCNC: 4.4 MMOL/L (ref 3.5–5.2)
PROT SERPL-MCNC: 7.2 G/DL (ref 6–8.5)
PROT UR QL STRIP: NEGATIVE
RBC # BLD AUTO: 4.86 X10E6/UL (ref 3.77–5.28)
SODIUM SERPL-SCNC: 138 MMOL/L (ref 134–144)
SP GR UR: 1.02 (ref 1–1.03)
T4 FREE SERPL-MCNC: 1.54 NG/DL (ref 0.82–1.77)
TRIGL SERPL-MCNC: 111 MG/DL (ref 0–149)
TSH SERPL DL<=0.005 MIU/L-ACNC: 0.99 UIU/ML (ref 0.45–4.5)
UROBILINOGEN UR STRIP-MCNC: 0.2 MG/DL (ref 0.2–1)
VLDLC SERPL CALC-MCNC: 20 MG/DL (ref 5–40)
WBC # BLD AUTO: 8.1 X10E3/UL (ref 3.4–10.8)

## 2021-02-12 NOTE — PROGRESS NOTES
Frantz Mart,  I have reviewed your results  Thyroid profile is normal, no change in current dose of Levothyroxine  Vitamin D is very normal and reassuring  Cholesterol levels have been up from last time, likely from lack of exercise during pandemic. No change in Zetia, but work on diet and exercise  All other results including blood count,kidney and liver function, urine,diabetes screen are normal  Let me know if you have any questions.   thanks

## 2021-02-12 NOTE — PROGRESS NOTES
Called patient. Name and  verified. Discussed lab results all are normal except cholesterol is high recommended to work on diet, exercise and informed no change in Zetia. Vit D is reassuring and Thyroid level is normal, no change in current dose of Levothyroxine.

## 2021-02-16 ENCOUNTER — TELEPHONE (OUTPATIENT)
Dept: FAMILY MEDICINE CLINIC | Age: 41
End: 2021-02-16

## 2021-02-16 NOTE — TELEPHONE ENCOUNTER
Chief Complaint   Patient presents with    Prior Auth     Ezetimibe 10 mg tablets ( Zetia ) :  APPROVED:  NO PRIOR AUTHORIZATION NEEDED FOR 90 FOR 90 DAY.  Other     Peer to Peer for [address]. Left voice message giving all information requested to complete PA/Peer to Peer. Requesting return call.

## 2021-03-08 DIAGNOSIS — T46.6X5A RHABDOMYOLYSIS DUE TO STATIN THERAPY: ICD-10-CM

## 2021-03-08 DIAGNOSIS — E78.01 FAMILIAL HYPERCHOLESTEROLEMIA: ICD-10-CM

## 2021-03-08 DIAGNOSIS — M62.82 RHABDOMYOLYSIS DUE TO STATIN THERAPY: ICD-10-CM

## 2021-03-08 DIAGNOSIS — F32.5 DEPRESSION, MAJOR, IN REMISSION (HCC): ICD-10-CM

## 2021-03-08 DIAGNOSIS — E03.9 ACQUIRED HYPOTHYROIDISM: ICD-10-CM

## 2021-03-08 DIAGNOSIS — Z78.9 STATIN INTOLERANCE: ICD-10-CM

## 2021-03-08 RX ORDER — LEVOTHYROXINE SODIUM 125 UG/1
125 TABLET ORAL
Qty: 90 TAB | Refills: 1 | Status: SHIPPED | OUTPATIENT
Start: 2021-03-08 | End: 2021-08-23

## 2021-03-08 RX ORDER — SERTRALINE HYDROCHLORIDE 50 MG/1
50 TABLET, FILM COATED ORAL DAILY
Qty: 90 TAB | Refills: 3 | Status: SHIPPED | OUTPATIENT
Start: 2021-03-08

## 2021-03-08 RX ORDER — EZETIMIBE 10 MG/1
10 TABLET ORAL DAILY
Qty: 90 TAB | Refills: 1 | Status: SHIPPED | OUTPATIENT
Start: 2021-03-08 | End: 2021-08-18

## 2021-03-08 NOTE — TELEPHONE ENCOUNTER
Receiving fax request for patient change to [employer] mail order. Thanks, bridget    Last Visit: 2/10/21 MD Virgilio Rubinstein  Next Appointment: Not scheduled  Previous Refill Encounter(s):   Ezetimibe 12/2/20 90 + 1  Levothyroxine 12/11/20 90  Sertraline 2/10/21 90 + 3 ([facility]    Requested Prescriptions     Pending Prescriptions Disp Refills    ezetimibe (ZETIA) 10 mg tablet 90 Tab 1     Sig: Take 1 Tab by mouth daily.  levothyroxine (SYNTHROID) 125 mcg tablet 90 Tab 1     Sig: Take 1 Tab by mouth Daily (before breakfast).  sertraline (ZOLOFT) 50 mg tablet 90 Tab 3     Sig: Take 1 Tab by mouth daily.

## 2021-03-19 NOTE — TELEPHONE ENCOUNTER
Follow up with Juliann for medication Zetia. No prior authorization needed for 90 for 90 prescription. Approve.   Wendy Ken LPN

## 2021-07-19 ENCOUNTER — OFFICE VISIT (OUTPATIENT)
Dept: FAMILY MEDICINE CLINIC | Age: 41
End: 2021-07-19
Payer: COMMERCIAL

## 2021-07-19 VITALS
HEART RATE: 66 BPM | WEIGHT: 130 LBS | DIASTOLIC BLOOD PRESSURE: 74 MMHG | TEMPERATURE: 98.6 F | SYSTOLIC BLOOD PRESSURE: 109 MMHG | BODY MASS INDEX: 23.04 KG/M2 | HEIGHT: 63 IN | OXYGEN SATURATION: 99 %

## 2021-07-19 DIAGNOSIS — E03.9 ACQUIRED HYPOTHYROIDISM: Primary | ICD-10-CM

## 2021-07-19 DIAGNOSIS — M75.91 SUPRASPINATUS TENDINITIS, RIGHT: ICD-10-CM

## 2021-07-19 DIAGNOSIS — T46.6X5A RHABDOMYOLYSIS DUE TO STATIN THERAPY: ICD-10-CM

## 2021-07-19 DIAGNOSIS — M62.82 RHABDOMYOLYSIS DUE TO STATIN THERAPY: ICD-10-CM

## 2021-07-19 DIAGNOSIS — E78.01 FAMILIAL HYPERCHOLESTEROLEMIA: ICD-10-CM

## 2021-07-19 DIAGNOSIS — R74.8 ELEVATED CK: ICD-10-CM

## 2021-07-19 PROCEDURE — 99214 OFFICE O/P EST MOD 30 MIN: CPT | Performed by: FAMILY MEDICINE

## 2021-07-19 RX ORDER — NORETHINDRONE AND ETHINYL ESTRADIOL 0.8-25(24)
KIT ORAL
COMMUNITY
Start: 2021-06-21

## 2021-07-19 NOTE — PROGRESS NOTES
HISTORY OF PRESENT ILLNESS  Leocadia Favre is a 36 y.o. female. Patient was seen today for follow-up on dyslipidemia and hypothyroidism. New concern addressed today is pain in anterior upper chest for last few days. HPI  Endocrine Review  Patient is seen for followup of hypothyroidism. Started after she was very sick with strep throat and had Flu shot. Had extreme myopathy, elevated Lipid profile, all her rheumatological work up was negative except highly abnormal thyroid profile. Since last visit: no changes.  She reports medication compliance: all the time and is taking separate from all other meds.  She reports the following concerns/problems/med side effects: none.  Lab review: labs reviewed and discussed with patient.    On Levothyroxine 125 mcg     Cardiovascular Review  The patient has hyperlipidemia.  She reports no TIA's, no chest pain on exertion, no dyspnea on exertion, no swelling of ankles, no orthostatic dizziness or lightheadedness, no orthopnea or paroxysmal nocturnal dyspnea, no palpitations, no muscle aches or pain.  Diet and Lifestyle: generally follows a low fat low cholesterol diet, generally follows a low sodium diet, exercises regularly, nonsmoker.  Lab review: labs reviewed and discussed with patient.    Medicines:not on statin due to elevated CPK in past, is doing well on Ezetimibe ,     Shoulder Pain  Patient complains of left side shoulder pain. The symptoms began several days ago Course of symptoms since onset has been gradually improving. Pain is described as overall severity = moderate, location: between neck and shoulder, behind the left collarbone and worse with overhead movements. Symptoms were incited by injury while lifting heavy box at work. Patient denies fever. Therapy to date includes none. Review of Systems   Constitutional: Negative for chills, fever and malaise/fatigue. HENT: Negative for congestion, ear pain, sore throat and tinnitus.     Eyes: Negative for blurred vision, double vision, pain and discharge. Respiratory: Negative for cough, shortness of breath and wheezing. Cardiovascular: Negative for chest pain, palpitations and leg swelling. Gastrointestinal: Negative for abdominal pain, blood in stool, constipation, diarrhea, nausea and vomiting. Genitourinary: Negative for dysuria, frequency, hematuria and urgency. Musculoskeletal: Negative for back pain, joint pain and myalgias. Pain behind left collarbone   Skin: Negative for rash. Neurological: Negative for dizziness, tremors, seizures and headaches. Endo/Heme/Allergies: Negative for polydipsia. Does not bruise/bleed easily. Psychiatric/Behavioral: Negative for depression and substance abuse. The patient is not nervous/anxious. Physical Exam  Vitals and nursing note reviewed. Constitutional:       Appearance: She is well-developed. She is not diaphoretic. HENT:      Head: Normocephalic and atraumatic. Right Ear: External ear normal.      Mouth/Throat:      Pharynx: No oropharyngeal exudate. Eyes:      General: No scleral icterus. Conjunctiva/sclera: Conjunctivae normal.      Pupils: Pupils are equal, round, and reactive to light. Neck:      Thyroid: No thyromegaly. Vascular: No JVD. Cardiovascular:      Rate and Rhythm: Normal rate and regular rhythm. Heart sounds: Normal heart sounds. No murmur heard. Pulmonary:      Effort: Pulmonary effort is normal.      Breath sounds: Normal breath sounds. No wheezing. Chest:       Abdominal:      General: Bowel sounds are normal. There is no distension. Palpations: Abdomen is soft. There is no mass. Musculoskeletal:         General: No tenderness. Normal range of motion. Arms:       Cervical back: Normal range of motion and neck supple. Comments: Left Shoulder- Empty Can Test: Positive. Drop Arm Test: Negative. Cross-Chest Test: Positive.   NEER test Positive  Mary Hortencia' test Negative Lymphadenopathy:      Cervical: No cervical adenopathy. Skin:     General: Skin is warm and dry. Findings: No rash. Neurological:      Mental Status: She is alert and oriented to person, place, and time. Cranial Nerves: No cranial nerve deficit. Deep Tendon Reflexes: Reflexes are normal and symmetric. Reflexes normal.         ASSESSMENT and PLAN  Diagnoses and all orders for this visit:    1. Acquired hypothyroidism  -     TSH 3RD GENERATION; Future  -     T4, FREE; Future    2. Rhabdomyolysis due to statin therapy  -     CK; Future    3. Familial hypercholesterolemia  -     LIPID PANEL; Future  -     METABOLIC PANEL, COMPREHENSIVE; Future    4. Supraspinatus tendinitis, right    5. Elevated CK  -     CK; Future    Discussed differential diagnosis of sprain of clavicle versus supraspinatus tendinitis. Stretches were shown in office. Recommended to use heating pad and OTC Aleve. Discussed lifestyle issues and health guidance given  Patient was given an after visit summary which includes diagnoses, vital signs, current medications, instructions and references & authorized prescriptions . Results of labs will be conveyed to patient, once available. Pt verbalized instructions I provided and expressed understanding of discussion that was held today.

## 2021-07-19 NOTE — PROGRESS NOTES
Jennifer Juan is a 36 y.o. 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 Have you been to the ER, urgent care clinic since your last visit? Hospitalized since your last visit? No    2. Have you seen or consulted any other health care providers outside of the [address] [place] [address] since your last visit? Include any pap smears or colon screening.  No    Visit Vitals  /74 (BP 1 Location: Left upper arm, BP Patient Position: Sitting)   Pulse 66   Temp 98.6 °F (37 °C) (Temporal)   Ht 5' 3\" (1.6 m)   Wt 130 lb (59 kg)   SpO2 99%   BMI 23.03 kg/m²

## 2021-07-19 NOTE — PATIENT INSTRUCTIONS
Learning About High Cholesterol  What is high cholesterol? High cholesterol means that you have too much cholesterol in your blood. Cholesterol is a type of fat. It's needed for many body functions, such as making new cells. Cholesterol is made by your body. It also comes from food you eat. Having high cholesterol can lead to the buildup of plaque in artery walls. This can increase your risk of heart disease and stroke. When your doctor talks about high cholesterol levels, he or she is talking about your total cholesterol and LDL cholesterol (the \"bad\" cholesterol) levels. Your doctor may also speak about HDL (the \"good\" cholesterol) levels. High HDL is linked with a lower risk for heart disease, heart attack, and stroke. Your cholesterol levels help your doctor find out your risk for having a heart attack or stroke. How can you prevent high cholesterol? A heart-healthy lifestyle can help you prevent high cholesterol. This lifestyle helps lower your risk for a heart attack and stroke. · Eat heart-healthy foods. ? Eat fruits, vegetables, whole grains (like oatmeal), dried beans and peas, nuts and seeds, soy products (like tofu), and fat-free or low-fat dairy products. ? Replace butter, margarine, and hydrogenated or partially hydrogenated oils with olive and canola oils. (Canola oil margarine without trans fat is fine.)  ? Replace red meat with fish, poultry, and soy protein (like tofu). ? Limit processed and packaged foods like chips, crackers, and cookies. · Be active. Exercise can improve your cholesterol level. Get at least 30 minutes of exercise on most days of the week. Walking is a good choice. You also may want to do other activities, such as running, swimming, cycling, or playing tennis or team sports. · Stay at a healthy weight. Lose weight if you need to. · Don't smoke. If you need help quitting, talk to your doctor about stop-smoking programs and medicines.  These can increase your chances of quitting for good. How is high cholesterol treated? The goal of treatment is to reduce your chances of having a heart attack or stroke. The goal is not to lower your cholesterol numbers only. · You may make lifestyle changes, such as eating healthy foods, not smoking, losing weight, and being more active. · You may have to take medicine. Follow-up care is a key part of your treatment and safety. Be sure to make and go to all appointments, and call your doctor if you are having problems. It's also a good idea to know your test results and keep a list of the medicines you take. Where can you learn more? Go to http://www.kirkpatrick.com/  Enter Q621 in the search box to learn more about \"Learning About High Cholesterol. \"  Current as of: August 31, 2020               Content Version: 12.8  © 8802-2898 Healthwise, Incorporated. Care instructions adapted under license by [employer] (which disclaims liability or warranty for this information). If you have questions about a medical condition or this instruction, always ask your healthcare professional. [address] any warranty or liability for your use of this information.

## 2021-07-29 LAB
ALBUMIN SERPL-MCNC: 4.4 G/DL (ref 3.8–4.8)
ALBUMIN/GLOB SERPL: 1.5 {RATIO} (ref 1.2–2.2)
ALP SERPL-CCNC: 72 IU/L (ref 48–121)
ALT SERPL-CCNC: 19 IU/L (ref 0–32)
AST SERPL-CCNC: 23 IU/L (ref 0–40)
BILIRUB SERPL-MCNC: 1.5 MG/DL (ref 0–1.2)
BUN SERPL-MCNC: 12 MG/DL (ref 6–24)
BUN/CREAT SERPL: 14 (ref 9–23)
CALCIUM SERPL-MCNC: 9 MG/DL (ref 8.7–10.2)
CHLORIDE SERPL-SCNC: 100 MMOL/L (ref 96–106)
CHOLEST SERPL-MCNC: 206 MG/DL (ref 100–199)
CK SERPL-CCNC: 100 U/L (ref 32–182)
CO2 SERPL-SCNC: 24 MMOL/L (ref 20–29)
CREAT SERPL-MCNC: 0.85 MG/DL (ref 0.57–1)
GLOBULIN SER CALC-MCNC: 2.9 G/DL (ref 1.5–4.5)
GLUCOSE SERPL-MCNC: 84 MG/DL (ref 65–99)
HDLC SERPL-MCNC: 51 MG/DL
IMP & REVIEW OF LAB RESULTS: NORMAL
LDLC SERPL CALC-MCNC: 140 MG/DL (ref 0–99)
POTASSIUM SERPL-SCNC: 4.2 MMOL/L (ref 3.5–5.2)
PROT SERPL-MCNC: 7.3 G/DL (ref 6–8.5)
SODIUM SERPL-SCNC: 137 MMOL/L (ref 134–144)
T4 FREE SERPL-MCNC: 1.68 NG/DL (ref 0.82–1.77)
TRIGL SERPL-MCNC: 84 MG/DL (ref 0–149)
TSH SERPL DL<=0.005 MIU/L-ACNC: 0.73 UIU/ML (ref 0.45–4.5)
VLDLC SERPL CALC-MCNC: 15 MG/DL (ref 5–40)

## 2021-07-30 NOTE — PROGRESS NOTES
Madalyn Ellis,  I have reviewed your results. Thyroid function is very normal and reassuring, no change in current levothyroxine dose. Cholesterol results are also reassuring and showing very stable total and bad cholesterol along with very normal triglycerides and HDL. To continue on ezetimibe along with diet and exercise. Kidney and liver function along with CK levels are very normal.Please let me know if any question, thanks.

## 2021-08-02 NOTE — PROGRESS NOTES
Called patient. Two patient identifiers verified. Discussed lab results. she Verbalized understanding.

## 2021-08-18 DIAGNOSIS — T46.6X5A RHABDOMYOLYSIS DUE TO STATIN THERAPY: ICD-10-CM

## 2021-08-18 DIAGNOSIS — Z78.9 STATIN INTOLERANCE: ICD-10-CM

## 2021-08-18 DIAGNOSIS — E78.01 FAMILIAL HYPERCHOLESTEROLEMIA: ICD-10-CM

## 2021-08-18 DIAGNOSIS — M62.82 RHABDOMYOLYSIS DUE TO STATIN THERAPY: ICD-10-CM

## 2021-08-18 RX ORDER — EZETIMIBE 10 MG/1
TABLET ORAL
Qty: 90 TABLET | Refills: 1 | Status: SHIPPED | OUTPATIENT
Start: 2021-08-18

## 2021-08-22 DIAGNOSIS — E03.9 ACQUIRED HYPOTHYROIDISM: ICD-10-CM

## 2021-08-23 RX ORDER — LEVOTHYROXINE SODIUM 125 UG/1
TABLET ORAL
Qty: 90 TABLET | Refills: 1 | Status: SHIPPED | OUTPATIENT
Start: 2021-08-23

## 2022-03-18 PROBLEM — Z80.51 FAMILY HISTORY OF RENAL CANCER: Status: ACTIVE | Noted: 2017-01-26

## 2022-03-18 PROBLEM — Z78.9 STATIN INTOLERANCE: Status: ACTIVE | Noted: 2020-03-17

## 2022-03-19 PROBLEM — E03.9 ACQUIRED HYPOTHYROIDISM: Status: ACTIVE | Noted: 2018-05-15

## 2022-03-19 PROBLEM — F32.5 DEPRESSION, MAJOR, IN REMISSION (HCC): Status: ACTIVE | Noted: 2017-01-26

## 2022-03-19 PROBLEM — R74.8 ELEVATED LIVER ENZYMES: Status: ACTIVE | Noted: 2018-01-23

## 2022-03-19 PROBLEM — E55.9 VITAMIN D INSUFFICIENCY: Status: ACTIVE | Noted: 2017-05-26

## 2022-03-20 PROBLEM — E78.01 FAMILIAL HYPERCHOLESTEROLEMIA: Status: ACTIVE | Noted: 2018-02-28

## 2022-08-06 DIAGNOSIS — E78.01 FAMILIAL HYPERCHOLESTEROLEMIA: ICD-10-CM

## 2022-08-06 DIAGNOSIS — T46.6X5A RHABDOMYOLYSIS DUE TO STATIN THERAPY: ICD-10-CM

## 2022-08-06 DIAGNOSIS — Z78.9 STATIN INTOLERANCE: ICD-10-CM

## 2022-08-06 DIAGNOSIS — M62.82 RHABDOMYOLYSIS DUE TO STATIN THERAPY: ICD-10-CM

## 2022-08-07 RX ORDER — EZETIMIBE 10 MG/1
TABLET ORAL
Qty: 90 TABLET | Refills: 1 | OUTPATIENT
Start: 2022-08-07

## 2022-08-07 NOTE — TELEPHONE ENCOUNTER
Patient has not been in office for more than a year as well as we do not have her blood work in last year. She is far due for her blood work. If she has moved to another state,  She needs to get establish care there and get a refill from provider there. It is not safe for me to continue refilling medication without checking her labs. Please let her know.

## 2022-08-08 NOTE — TELEPHONE ENCOUNTER
Spoke with pt informed her that her medication was refused due to needing an appt. Pt then stated that she has moved and has a new PCP and that she meant for the medication request to go to her new PCP on My Chart.

## 2023-02-22 NOTE — TELEPHONE ENCOUNTER
PCP: Kulwinder Ayon MD    Last appt: 2/6/2020  Future Appointments   Date Time Provider Sancho Courtney   8/6/2020  9:20 AM Kulwinder Ayon MD [facility]       Requested Prescriptions     Pending Prescriptions Disp Refills    levothyroxine (SYNTHROID) 125 mcg tablet 90 Tab 0     Sig: Take 1 Tab by mouth Daily (before breakfast).
Constitutional: as per HPI  Eyes: No eye pain or discharge  ENMT:  No difficulty hearing; No sinus or throat pain  Neck: No pain or stiffness  Respiratory: No cough, wheezing, chills or hemoptysis  Cardiovascular: No chest pain, palpitations, shortness of breath, dyspnea on exertion  Gastrointestinal: No abdominal pain, nausea, vomiting or hematemesis; No diarrhea or constipation.   Genitourinary: No dysuria, frequency, hematuria or incontinence  Neurological: As per HPI  Skin: No rashes or lesions   Endocrine: No heat or cold intolerance; No hair loss  Musculoskeletal: No joint pain or swelling  Psychiatric: No depression, anxiety, mood swings  Heme/Lymph: No easy bruising or bleeding gums

## 2023-04-21 NOTE — PATIENT INSTRUCTIONS
History     Chief Complaint   Patient presents with     Abdominal Pain     Pt has umbilical hernia.  Complains of pain in the area.  Worse today.      HPI  Maddy Ortiz is a 38 year old female who  has a past medical history of Bipolar 1 disorder (H) (12/6/2012), Depressive disorder, Diabetes mellitus, type 2 (H) (12/13/2021), and Paranoid type delusional disorder (H) (11/14/2012).  She presents to the emergency department complaining of pain associated with her umbilical hernia.  She states that when she was walking around today she noticed that it was sticking out more than usual and was uncomfortable.  She states since getting in the emergency room and lying down it seems to have gotten back to its normal size and no longer hurts.  She has had no vomiting.  She states that she had been in discussions with surgeons in the past about fixing this but had difficulty finding someone to help take care of her after the surgery.  Her only other complaint is that she needs a prescription for risperdal all as she has been unable to get her injectable risperdal.          Allergies:  Allergies   Allergen Reactions     Dust Mites Shortness Of Breath     Animal Dander      Other reaction(s): *Unknown     Mold      Other reaction(s): Runny Nose     Trees        Problem List:    Patient Active Problem List    Diagnosis Date Noted     mirena IUD 9/30/22 09/30/2022     Priority: Medium     Mirena IUD placed 9/30/2022  LOT# [id number]  Exp: 10/2024  NDC# 99608-196-82    Lexie Cartagena on 9/30/2022 at 1:35 PM           Overdose, undetermined intent, initial encounter 06/18/2022     Priority: Medium     Segmental and somatic dysfunction 05/10/2022     Priority: Medium     SI (sacroiliac) joint dysfunction 05/10/2022     Priority: Medium     Polypharmacy 04/25/2022     Priority: Medium     Sedated due to multiple medications 04/25/2022     Priority: Medium     Elevated LFTs 01/08/2022     Priority: Medium     Disorganized behavior  It was a pleasure to see you! As discussed: Your cholesterol is improved! High Cholesterol: Care Instructions Your Care Instructions Cholesterol is a type of fat in your blood. It is needed for many body functions, such as making new cells. Cholesterol is made by your body. It also comes from food you eat. High cholesterol means that you have too much of the fat in your blood. This raises your risk of a heart attack and stroke. LDL and HDL are part of your total cholesterol. LDL is the \"bad\" cholesterol. High LDL can raise your risk for heart disease, heart attack, and stroke. HDL is the \"good\" cholesterol. It helps clear bad cholesterol from the body. High HDL is linked with a lower risk of heart disease, heart attack, and stroke. Your cholesterol levels help your doctor find out your risk for having a heart attack or stroke. You and your doctor can talk about whether you need to lower your risk and what treatment is best for you. A heart-healthy lifestyle along with medicines can help lower your cholesterol and your risk. The way you choose to lower your risk will depend on how high your risk is for heart attack and stroke. It will also depend on how you feel about taking medicines. Follow-up care is a key part of your treatment and safety. Be sure to make and go to all appointments, and call your doctor if you are having problems. It's also a good idea to know your test results and keep a list of the medicines you take. How can you care for yourself at home? · Eat a variety of foods every day. Good choices include fruits, vegetables, whole grains (like oatmeal), dried beans and peas, nuts and seeds, soy products (like tofu), and fat-free or low-fat dairy products. · Replace butter, margarine, and hydrogenated or partially hydrogenated oils with olive and canola oils. (Canola oil margarine without trans fat is fine.) · Replace red meat with fish, poultry, and soy protein (like tofu). 01/08/2022     Priority: Medium     Infection due to 2019 novel coronavirus 01/08/2022     Priority: Medium     Type 2 diabetes mellitus without complication, without long-term current use of insulin (H) 12/13/2021     Priority: Medium     Fatty liver 11/05/2021     Priority: Medium     Umbilical hernia without obstruction and without gangrene 10/26/2021     Priority: Medium     Schizoaffective disorder (H) 07/13/2021     Priority: Medium     Morbid obesity (H) 01/02/2019     Priority: Medium     Paranoia (psychosis) (H) 08/24/2018     Priority: Medium     Gastroesophageal reflux disease without esophagitis 06/08/2018     Priority: Medium     Schizoaffective disorder, bipolar type (H) 05/07/2018     Priority: Medium     Encounter for IUD insertion 09/15/2017     Priority: Medium     4/24/22 bryanna insertion lot# [id number] exp-4/2023       Seasonal allergic rhinitis due to pollen 11/04/2016     Priority: Medium     Allergic rhinitis due to mold 11/04/2016     Priority: Medium     Allergic rhinitis due to animal dander 11/04/2016     Priority: Medium     Allergic rhinitis due to dust mite 11/04/2016     Priority: Medium     Depression with anxiety 01/26/2015     Priority: Medium     Insomnia 07/18/2013     Priority: Medium     Bipolar 1 disorder (H) 12/06/2012     Priority: Medium     Planning on seeing Purvi (psychiatric nurse)       Paranoid type delusional disorder (H) 11/14/2012     Priority: Medium     Psychosis (H) 10/16/2012     Priority: Medium     Animal dander allergy 05/09/2012     Priority: Medium     Dog and Cat       CARDIOVASCULAR SCREENING; LDL GOAL LESS THAN 160 05/09/2012     Priority: Medium        Past Medical History:    Past Medical History:   Diagnosis Date     Bipolar 1 disorder (H) 12/6/2012     Depressive disorder      Diabetes mellitus, type 2 (H) 12/13/2021     Paranoid type delusional disorder (H) 11/14/2012       Past Surgical History:    Past Surgical History:   Procedure Laterality Date  · Limit processed and packaged foods like chips, crackers, and cookies. · Bake, broil, or steam foods. Don't varela them. · Be physically active. Get at least 30 minutes of exercise on most days of the week. Walking is a good choice. You also may want to do other activities, such as running, swimming, cycling, or playing tennis or team sports. · Stay at a healthy weight or lose weight by making the changes in eating and physical activity listed above. Losing just a small amount of weight, even 5 to 10 pounds, can reduce your risk for having a heart attack or stroke. · Do not smoke. When should you call for help? Watch closely for changes in your health, and be sure to contact your doctor if: 
  · You need help making lifestyle changes.  
  · You have questions about your medicine. Where can you learn more? Go to http://cristina-mily.info/. Enter C217 in the search box to learn more about \"High Cholesterol: Care Instructions. \" Current as of: December 6, 2017 Content Version: 11.8 © 3477-5999 Healthwise, Incorporated. Care instructions adapted under license by [employer] (which disclaims liability or warranty for this information). If you have questions about a medical condition or this instruction, always ask your healthcare professional. [address] any warranty or liability for your use of this information.     TONSILLECTOMY & ADENOIDECTOMY      as a child     TONSILLECTOMY & ADENOIDECTOMY         Family History:    Family History   Adopted: Yes   Problem Relation Age of Onset     Unknown/Adopted Mother      Unknown/Adopted Father      Unknown/Adopted Other      Hypertension Sister        Social History:  Marital Status:  Single [1]  Social History     Tobacco Use     Smoking status: Former     Packs/day: 0.50     Types: Cigarettes     Smokeless tobacco: Former     Tobacco comments:     around 2nd hand smoke   Vaping Use     Vaping status: Never Used     Passive vaping exposure: Yes   Substance Use Topics     Alcohol use: Not Currently     Drug use: Not Currently        Medications:    risperiDONE (RISPERDAL) 2 MG tablet  ACCU-CHEK GUIDE test strip  acetaminophen (TYLENOL) 325 MG tablet  albuterol (PROAIR HFA/PROVENTIL HFA/VENTOLIN HFA) 108 (90 Base) MCG/ACT inhaler  ammonium lactate (LAC-HYDRIN) 12 % external cream  azelastine (ASTELIN) 0.1 % nasal spray  budesonide-formoterol (SYMBICORT) 80-4.5 MCG/ACT Inhaler  fluticasone (FLONASE) 50 MCG/ACT nasal spray  gabapentin (NEURONTIN) 300 MG capsule  ibuprofen (ADVIL/MOTRIN) 200 MG tablet  insulin pen needle (32G X 4 MM) 32G X 4 MM miscellaneous  lamoTRIgine (LAMICTAL) 200 MG tablet  lamoTRIgine (LAMICTAL) 25 MG tablet  levonorgestrel (MIRENA) 20 MCG/DAY IUD  levothyroxine (SYNTHROID/LEVOTHROID) 50 MCG tablet  loratadine (CLARITIN) 10 MG tablet  risperiDONE microspheres ER (RISPERDAL CONSTA) 50 MG injection  spacer (OPTICHAMBER APOLINAR) holding chamber  VICTOZA PEN 18 MG/3ML soln          Review of Systems   All other systems reviewed and are negative.      Physical Exam   BP: 123/78  Pulse: 79  Temp: 97.6  F (36.4  C)  Resp: 16  Weight: 99.8 kg (220 lb)  SpO2: 96 %      Physical Exam  Vitals and nursing note reviewed.   Constitutional:       General: She is not in acute distress.     Appearance: She is well-developed. She is not ill-appearing, toxic-appearing or  diaphoretic.      Comments: Patient is awake and alert, she appears in no acute distress.  Affect is somewhat guarded and patient does avoid eye contact but otherwise interacts normally.   HENT:      Head: Normocephalic and atraumatic.      Mouth/Throat:      Lips: Pink.      Mouth: Mucous membranes are moist.      Pharynx: Oropharynx is clear. No oropharyngeal exudate.   Eyes:      General: Lids are normal. No scleral icterus.     Extraocular Movements: Extraocular movements intact.      Right eye: No nystagmus.      Left eye: No nystagmus.      Conjunctiva/sclera: Conjunctivae normal.      Pupils: Pupils are equal, round, and reactive to light.   Neck:      Thyroid: No thyromegaly.      Vascular: No JVD.      Trachea: No tracheal deviation.   Cardiovascular:      Rate and Rhythm: Normal rate and regular rhythm.      Pulses: Normal pulses.      Heart sounds: Normal heart sounds. No murmur heard.     No friction rub. No gallop.   Pulmonary:      Effort: Pulmonary effort is normal. No respiratory distress.      Breath sounds: Normal breath sounds.   Abdominal:      General: Bowel sounds are normal. There is no distension.      Palpations: Abdomen is soft. There is no mass.      Tenderness: There is no abdominal tenderness. There is no guarding or rebound.      Hernia: A hernia is present. Hernia is present in the umbilical area.      Comments: Moderate sized soft, reducible umbilical hernia noted.  Otherwise, abdomen is soft and nontender.   Musculoskeletal:         General: No tenderness. Normal range of motion.      Cervical back: Normal range of motion and neck supple. No erythema or rigidity.      Right lower leg: No edema.      Left lower leg: No edema.   Lymphadenopathy:      Cervical: No cervical adenopathy.   Skin:     General: Skin is warm and dry.      Capillary Refill: Capillary refill takes less than 2 seconds.      Coloration: Skin is not pale.      Findings: No erythema or rash.   Neurological:       Mental Status: She is alert and oriented to person, place, and time.      Cranial Nerves: No cranial nerve deficit.      Sensory: No sensory deficit.      Motor: Motor function is intact.   Psychiatric:         Mood and Affect: Mood and affect normal.         Speech: Speech normal.         Behavior: Behavior normal.         ED Course                 Procedures             Assessments & Plan (with Medical Decision Making)     I have reviewed the nursing notes.    I have reviewed the findings, diagnosis, plan and need for follow up with the patient.  This patient presented to the emergency department with complaints of increased size of umbilical hernia.  Nothing on clinical exam suggests that the hernia is incarcerated and she has no signs or symptoms of obstruction.  Hernia is soft and reducible and at this point time I feel comfortable with outpatient management.  She was given a abdominal binder to help with symptoms and was instructed to return should she have increased pain.  Prescription for Risperdal was sent to her pharmacy and she was urged to follow-up with her prescriber as she should be on the injectable form.  She was discharged in good condition.        New Prescriptions    RISPERIDONE (RISPERDAL) 2 MG TABLET    Take 1 tablet (2 mg) by mouth At Bedtime       Final diagnoses:   Umbilical hernia without obstruction and without gangrene       4/21/2023   [facility] EMERGENCY DEPT     Oziel Marques MD  04/21/23 [id number]

## 2023-05-22 RX ORDER — EZETIMIBE 10 MG/1
1 TABLET ORAL DAILY
COMMUNITY
Start: 2021-08-18

## 2023-05-22 RX ORDER — NORETHINDRONE AND ETHINYL ESTRADIOL AND FERROUS FUMARATE 0.8-25(24)
KIT ORAL
COMMUNITY
Start: 2021-06-21

## 2023-05-22 RX ORDER — LEVOTHYROXINE SODIUM 0.12 MG/1
TABLET ORAL
COMMUNITY
Start: 2021-08-23

## 2023-12-22 NOTE — PROGRESS NOTES
Hi Ms. Patel Speaks to hear that you are still having muscle pain and intermittent pain.  Unfortunately they can take up to 4 weeks for your symptoms to completely improved.  Most recent labs show that your kidney function is almost normal again.  Your liver function went up slightly but is no higher than it has been previously.  The muscle breakdown product, CK, is decreasing as expected but is still slightly elevated.  This indicates that your muscles are still healing.  I recommend that you continue to have light physical duty while your muscles improved.  Stay well hydrated as you have been doing. Unless things worsen, let us plan to follow-up after you see the liver doctor. As always, do not hesitate to contact the office if you have any questions or concerns before your next appointment.      Kind regards,   Dr. Oly Mcintyre Visit Summary for DANIEL CANELA - Gender: Female - Date of Birth: 1977  Date: [id number] - Duration: 17 minutes  Patient: DANIEL CANELA  Provider: Shannon Severino PA-C    Patient Contact Information  Address  [address] [facility]; [place] [address]  [phone]    Visit Topics  Lungs/mucus [Added By: Self - 2023-12-22]  Cold [Added By: Self - 2023-12-22]    Triage Questions   What is your current physical address in the event of a medical emergency? Answer []  Are you allergic to any medications? Answer []  Are you now or could you be pregnant? Answer []  Do you have any immune system compromise or chronic lung   disease? Answer []  Do you have any vulnerable family members in the home (infant, pregnant, cancer, elderly)? Answer []     Conversation Transcripts  [0A][0A] [Notification] You are connected with Shannon Severino PA-C, Urgent Care Specialist.[0A][Notification] DANIEL CANELA is located in [place].[0A][Notification] DANIEL CANELA has shared health history...[0A]    Diagnosis  Acute bronchitis    Procedures  Value: [id number] Code: CPT-4 UNLISTED E&M SERVICE    Medications Prescribed    Cyclafem 1/35 (28)      Frequency :             Electronically signed by: Severino PA-C, Shannon(NPI [id number])

## 2024-12-15 NOTE — PROGRESS NOTES
Case Management Assessment & Discharge Planning Note    Patient name Matthew Alvarez  Location /-01 MRN [id number]  : 1954 Date 12/15/2024       Current Admission Date: 2024  Current Admission Diagnosis:Acute on chronic systolic heart failure (HCC)   Patient Active Problem List    Diagnosis Date Noted Date Diagnosed    ESRD (end stage renal disease) on dialysis ([facility]) 2024     Ambulatory dysfunction 2024     Thrombocytopenia (HCC) 2024     Renal failure 11/15/2024     Encounter for hemodialysis ([facility]) 11/15/2024     Anemia due to chronic kidney disease, on chronic dialysis ([facility]) 11/15/2024     Anemia in chronic kidney disease 11/15/2024     Vitamin D deficiency, unspecified 11/15/2024     Acute hemodialysis patient ([facility]) 2024     Chronic systolic (congestive) heart failure ([facility]) 2024     Oliguria 2024     Encounter for hemodialysis for ESRD ([facility]) 2024     Hyponatremia 2024     Dilated cardiomyopathy ([facility]) 2024     Elevated liver transaminase level 11/10/2024     Acute on chronic systolic heart failure (HCC) 2024     Coronary artery disease involving native coronary artery of native heart without angina pectoris 10/08/2021     Rheumatoid factor positive 2021     Protein calorie malnutrition (HCC) 2021     Elevated troponin 2021     Tobacco abuse 2021     Nonischemic cardiomyopathy (HCC) 2021     Uncontrolled hypertension 2021       LOS (days): 1  Geometric Mean LOS (GMLOS) (days):   Days to GMLOS:     OBJECTIVE:  PATIENT READMITTED TO HOSPITAL  Risk of Unplanned Readmission Score: 18.91         Current admission status: Inpatient  Referral Reason: Other (Post Acute Placement (specify)  Post Acute Home Needs (VNA/DME/Infusion)  Social Issues)    Preferred Pharmacy:   [id number] [place] [facility] [address]  [address]  [facility] [place] [address]  Phone: [phone]  Endocrinology Visit    CC: hypothyroidism    History of present illness:  Piero Taylor is a 45 y.o. female who returns for hypothyroidism. TSH was elevated to 77 in March 2018. She had symptoms including exhaustion, muscle weakness, cold intolerance, 10 lb weight gain, and generalized edema in the setting of recently diagnosed statin-induced CK elevation (possibly rhabdomyolysis). She was started on levothyroxine 75 mcg daily on 3/13/18 and reports resolution of her hypothyroid symptoms with in a few weeks of starting the medication. Most recent TSH returned improved but still elevated at 18 in early May. I saw her in initial consultation in May at which time I increased her dose to 112 mcg daily. At her last visit I increased her dose to 125 mcg daily and f/u thyroid levels have been normal on this dose. She currently takes generic levothyroxine 125 mcg daily. She takes this correctly on an empty stomach, first thing in the morning waiting 30-60 minutes for food and coffee. Takes her MVI and OCP later in the day. She reports more regular cycles now and energy level is somewhat improved. She is not quite as active at work and feels she needs to start exercising more. She denies racing heart, tremor, palpitations, heat or cold intolerance, or changes to her bowel habits. Still c/o of some muscle weakness and soreness which is unexplained by activity. She has a h/o familial hypercholesterolemia (LDL>200) but her most recent LDL had improved to 145 so no medication was started. Praluent is on her list but she is not taking it.      ROS: see HPI for pertinent positives and negatives, otherwise a 7 system review is negative    Problem list:  Patient Active Problem List   Diagnosis Code    Seasonal affective disorder ([facility] F33.8    Depression, major, in remission ([facility] F32.5    Family history of renal cancer Z80.51    Vitamin D insufficiency E55.9    Elevated liver enzymes R74.8    Familial Fax: [phone]    [facility] - [place], [place] - [address]  [address]  [place] [address]  Phone: [phone] Fax: [phone]    Primary Care Provider: Olive Rodriguez MD    Primary Insurance: [facility] RE[facility]  Secondary Insurance:     ASSESSMENT:  Active Health Care Proxies    There are no active Health Care Proxies on file.       Advance Directives  Does patient have a Health Care POA?: Yes  Does patient have Advance Directives?: Yes  Advance Directives: Living will, Power of  for health care  Primary Contact: Aixasierra Alvarez, spouse         Readmission Root Cause  30 Day Readmission: Yes  During your hospital stay, did someone (provider, nurse, ) explain your care to you in a way you could understand?: Yes  Did you feel medically stable to leave the hospital?: Yes  Were you able to pay for your medication at the pharmacy?: Yes  Did you have reliable transportation to take you to your appointments?: Yes  During previous admission, was a post-acute recommendation made?: Yes  What post-acute resources were offered?: HHC (pt refused at discharge)  Patient was readmitted due to: fall at home    Patient Information  Admitted from:: Home  Mental Status: Confused  During Assessment patient was accompanied by: Not accompanied during assessment  Assessment information provided by:: Son  Primary Caregiver: Self  Support Systems: Son, Spouse/significant other, Family members  County [facility] Residence: [facility]  What city do you live in?: [place]  Home entry access options. Select all that apply.: Stairs  Number of steps to enter home.: 1  Do the steps have railings?: No  Type of Current Residence: [address] home  Upon entering residence, is there a bedroom on the main floor (no further steps)?: No (pt sleeps on sofa on first floor)  A bedroom is located on the following floor levels of residence (select all that apply):: 2nd Floor  Upon entering residence, is  hypercholesterolemia E78.01    Acquired hypothyroidism E03.9       Medical history:  Past Medical History:   Diagnosis Date    Hiatal hernia     Hypercholesterolemia     Hypothyroid     Rhabdomyolysis due to statin therapy 12/2017    After atorvastatin 40 mg     Rhabdomyolysis due to statin therapy 1/25/2018    Vitamin D deficiency        Past surgical history:  History reviewed. No pertinent surgical history. Medications:    Current Outpatient Medications:     KAITLIB FE 0.8mg-25mcg(24) and 75 mg (4) chew, TAKE 1 TABLET BY MOUTH EVERY DAY, Disp: , Rfl: 4    levothyroxine (SYNTHROID) 125 mcg tablet, Take 1 Tab by mouth Daily (before breakfast). , Disp: 30 Tab, Rfl: 5    multivitamin (ONE A DAY) tablet, Take 1 Tab by mouth daily. , Disp: , Rfl:     cholecalciferol (VITAMIN D3) 1,000 unit tablet, Take  by mouth daily. , Disp: , Rfl:     levothyroxine (SYNTHROID) 75 mcg tablet, Take 75 mcg by mouth., Disp: , Rfl:     sertraline (ZOLOFT) 50 mg tablet, TAKE 1 TABLET BY MOUTH EVERY DAY, Disp: 60 Tab, Rfl: 4    alirocumab (PRALUENT PEN) 75 mg/mL injector pen, 1 mL by SubCUTAneous route Once every 2 weeks. , Disp: 2 Syringe, Rfl: 11    coenzyme q10 (CO Q-10) 10 mg cap, Take  by mouth., Disp: , Rfl:     Allergies:   Allergies   Allergen Reactions    Atorvastatin Other (comments)    Sulfa (Sulfonamide Antibiotics) Rash       Social History:  Social History     Socioeconomic History    Marital status: SINGLE     Spouse name: Not on file    Number of children: Not on file    Years of education: Not on file    Highest education level: Not on file   Occupational History    Not on file   Social Needs    Financial resource strain: Not on file    Food insecurity:     Worry: Not on file     Inability: Not on file    Transportation needs:     Medical: Not on file     Non-medical: Not on file   Tobacco Use    Smoking status: Never Smoker    Smokeless tobacco: Never Used   Substance and Sexual Activity    Alcohol use: Yes     Alcohol/week: 1.2 oz     Types: 1 Glasses of wine, 1 Shots of liquor per week     Comment: rare    Drug use: No    Sexual activity: Yes     Partners: Male     Birth control/protection: Pill   Lifestyle    Physical activity:     Days per week: Not on file     Minutes per session: Not on file    Stress: Not on file   Relationships    Social connections:     Talks on phone: Not on file     Gets together: Not on file     Attends Anabaptist service: Not on file     Active member of club or organization: Not on file     Attends meetings of clubs or organizations: Not on file     Relationship status: Not on file    Intimate partner violence:     Fear of current or ex partner: Not on file     Emotionally abused: Not on file     Physically abused: Not on file     Forced sexual activity: Not on file   Other Topics Concern    Not on file   Social History Narrative    Pt is  at [employer]. She is from [place]. Went to \[employer]\"" for college (arts degree). Then went to [employer] for specialized animal training degree. Family History:  Family History   Problem Relation Age of Onset    Elevated Lipids Father     Stroke Maternal Grandfather     Heart Attack Paternal Grandmother     Heart Attack Paternal Grandfather     Heart Attack Paternal Aunt     Cancer Mother         renal cell carcinoma    No Known Problems Brother     Diabetes Paternal Uncle     Hypertension Maternal Grandmother        Physical Exam:  Visit Vitals  /70   Pulse 80   Resp 16   Ht 5' 3\" (1.6 m)   Wt 124 lb 11.2 oz (56.6 kg)   SpO2 100%   BMI 22.09 kg/m²      Gen: NAD  Heent: mucous membranes moist, no lid lag, stare or exophthalmos. No scleral or conjunctival injection. Extra ocular muscles intact .   Thyroid: moves well with swallowing, normal size, normal texture, no thyroid bruit, negative pembertons sign, no masses appreciated  Heme/lymph: no cervical, supraclavicular or submandibular there a bathroom on the main floor (no further steps)?: Yes (1/2 bath)  Number of steps to 2nd floor from main floor: One Flight  Living Arrangements: Lives w/ Spouse/significant other, Lives w/ Son  Is patient a ?: No    Activities of Daily Living Prior to Admission  Functional Status: Independent  Completes ADLs independently?: No  Level of ADL dependence: Assistance  Ambulates independently?: Yes  Does patient use assisted devices?: Yes  Assisted Devices (DME) used: Walker, Wheelchair, Other (Comment) ([employer])  Does patient currently own DME?: Yes  What DME does the patient currently own?: Walker, Wheelchair, Other (Comment) ([employer])  Does patient have a history of Outpatient Therapy (PT/OT)?: No  Does the patient have a history of Short-Term Rehab?: No  Does patient have a history of HHC?: No  Does patient currently have HHC?: No         Patient Information Continued  Income Source: SSI/SSD  Does patient have prescription coverage?: Yes  Does patient receive dialysis treatments?: Yes ([facility] M, W, F)  Does patient have a history of substance abuse?: No  Does patient have a history of Mental Health Diagnosis?: No         Means of Transportation  Means of Transport to Appts:: Family transport          DISCHARGE DETAILS:    Discharge planning discussed with:: patient and Rafael bee  Freedom of Choice: Yes  Comments - Freedom of Choice: [facility] referral for STR  CM contacted family/caregiver?: Yes             Contacts  Patient Contacts: cristal Tomlinson  Relationship to Patient:: Family  Contact Method: Phone  Phone Number: [phone]  Reason/Outcome: Discharge Planning, Continuity of Care    Requested Home Health Care         Is the patient interested in HHC at discharge?: No    DME Referral Provided  Referral made for DME?: No              Treatment Team Recommendation: Short Term Rehab  Discharge Destination Plan:: Short Term Rehab              CM met with patient and contacted cristal Rafael  baseline information was obtained. CM discussed the role of CM in helping the patient develop a discharge plan and assist the patient in carry out their plan.     Patient lives with son and spouse in 2 story home. Patient sleeps on sofa or in WC on first floor of home. 1/2 bath located on first floor. Patient requires son's assistance to go up stairs for shower and this does not happen often.  Son reports his uncle, Oc oBo, provides transport for patient to and from dialysis M,W,F.    CM discussed with patient and son therapy recommendation for STR and both agreeable.  CM submitted [facility]IN referral for STR.      CM to follow.                                                    lymphadenopathy is appreciated. Pulmonary: clear to auscultation bilaterally, no wheezes/rhonchi or rales  Cardiovascular: regular rate and rhythm, no murmurs, rubs or gallops, good distal pulses  Extremities: no clubbing, cyanosis or edema. No onocholysis   Neuro: no tremor of the outstretch hands, reflexes are normal with normal relaxation phase.  Normal gait, normal concentration  Skin: normal texture, warm and dry  Psyche: normal affect with good insight into her medical conditions    Pertinent lab review:    Component      Latest Ref Rng & Units 3/13/2019 11/26/2018 9/11/2018 7/12/2018           9:41 AM 11:11 AM  4:53 PM  9:31 AM   T4, Total      4.5 - 12.0 ug/dL       T3 Uptake      24 - 39 %       Free Thyroxine Index (FTI)      1.2 - 4.9       Thyroid peroxidase Ab      0 - 34 IU/mL       Thyroglobulin Ab      0.0 - 0.9 IU/mL       TSH      0.450 - 4.500 uIU/mL 1.420 1.440 2.550 3.190   T4, Free      0.82 - 1.77 ng/dL 1.58 1.47 1.43 1.62     Component      Latest Ref Rng & Units 5/2/2018 5/2/2018 3/10/2018 3/10/2018           4:06 PM  4:06 PM 11:33 AM 11:33 AM   T4, Total      4.5 - 12.0 ug/dL 9.2   0.4 (LL)   T3 Uptake      24 - 39 % 22 (L)   12 (L)   Free Thyroxine Index (FTI)      1.2 - 4.9 2.0   0.0 (L)   Thyroid peroxidase Ab      0 - 34 IU/mL   15    Thyroglobulin Ab      0.0 - 0.9 IU/mL   <1.0    TSH      0.450 - 4.500 uIU/mL  18.800 (H)     T4, Free      0.82 - 1.77 ng/dL         Component      Latest Ref Rng & Units 3/10/2018 5/18/2017 5/18/2017          11:33 AM  9:14 AM  9:14 AM   T4, Total      4.5 - 12.0 ug/dL   11.3   T3 Uptake      24 - 39 %   22 (L)   Free Thyroxine Index (FTI)      1.2 - 4.9   2.5   Thyroid peroxidase Ab      0 - 34 IU/mL      Thyroglobulin Ab      0.0 - 0.9 IU/mL      TSH      0.450 - 4.500 uIU/mL 77.130 (H) 0.795    T4, Free      0.82 - 1.77 ng/dL        Lab Results   Component Value Date/Time    Cholesterol, total 224 (H) 11/26/2018 11:10 AM    HDL Cholesterol 54 11/26/2018 11:10 AM    LDL, calculated 145 (H) 11/26/2018 11:10 AM    VLDL, calculated 25 11/26/2018 11:10 AM    Triglyceride 125 11/26/2018 11:10 AM     Assessment and plan:  Astrid Guo is a 45 y.o. female here for recently diagnosed hypothyroidism. She is clinically and biochemically euthyroid on current LT4 dose of 125 mcg daily and TSH is in an optimal range, so I recommend continuing this dose. We discussed that she can have her PCP check TSH again this summer. If uptrending, then a trial of 137 mcg daily could be considered. Regarding her very high LDL, it appears to be improved significantly since starting levothyroxine. There may still be a genetic/familial component but agree with avoiding pharmacologic therapy for now given her low CV risk. I spent 15 minutes with the patient today and > 50% of the time was spent counseling the patient about hypothyroidism: its etiology, clinical manifestations, diagnosis, and management. She will return in 6 months time with labs done just prior. Thank you for the opportunity to partake in this patients care.   Kim Negro MD  [facility] End[facility]  [facility]